# Patient Record
Sex: FEMALE | Race: WHITE | NOT HISPANIC OR LATINO | Employment: OTHER | URBAN - METROPOLITAN AREA
[De-identification: names, ages, dates, MRNs, and addresses within clinical notes are randomized per-mention and may not be internally consistent; named-entity substitution may affect disease eponyms.]

---

## 2017-01-20 ENCOUNTER — APPOINTMENT (OUTPATIENT)
Dept: RADIOLOGY | Facility: MEDICAL CENTER | Age: 67
End: 2017-01-20
Attending: ORTHOPAEDIC SURGERY
Payer: MEDICARE

## 2017-01-20 DIAGNOSIS — M23.203 DERANGEMENT OF MEDIAL MENISCUS OF RIGHT KNEE DUE TO OLD INJURY: ICD-10-CM

## 2017-01-20 PROCEDURE — 73721 MRI JNT OF LWR EXTRE W/O DYE: CPT | Mod: RT

## 2017-01-27 DIAGNOSIS — Z01.810 PRE-OPERATIVE CARDIOVASCULAR EXAMINATION: ICD-10-CM

## 2017-01-27 LAB — EKG IMPRESSION: NORMAL

## 2017-01-27 RX ORDER — CELECOXIB 100 MG/1
100 CAPSULE ORAL PRN
COMMUNITY
End: 2018-03-13

## 2017-01-27 RX ORDER — ACETAMINOPHEN 500 MG
500-1000 TABLET ORAL PRN
Status: ON HOLD | COMMUNITY
End: 2020-12-16

## 2017-01-27 RX ORDER — CYCLOBENZAPRINE HCL 10 MG
10 TABLET ORAL 3 TIMES DAILY PRN
COMMUNITY
End: 2017-03-28 | Stop reason: SDUPTHER

## 2017-01-27 NOTE — OR NURSING
Pre-admit appointment completed. Pt instructed to continue regularly prescribed medications through the day before surgery. Pt instructed to take the following medications the day of surgery with a sip of water, per anesthesia protocol; Synthroid, and tylenol, flexeril, norco if needed.

## 2017-01-30 ENCOUNTER — HOSPITAL ENCOUNTER (OUTPATIENT)
Facility: MEDICAL CENTER | Age: 67
End: 2017-01-30
Attending: ORTHOPAEDIC SURGERY | Admitting: ORTHOPAEDIC SURGERY
Payer: MEDICARE

## 2017-01-30 VITALS
OXYGEN SATURATION: 92 % | HEIGHT: 64 IN | HEART RATE: 75 BPM | BODY MASS INDEX: 35.53 KG/M2 | RESPIRATION RATE: 14 BRPM | DIASTOLIC BLOOD PRESSURE: 58 MMHG | TEMPERATURE: 97 F | SYSTOLIC BLOOD PRESSURE: 119 MMHG | WEIGHT: 208.11 LBS

## 2017-01-30 PROBLEM — M23.203 DERANGEMENT OF MEDIAL MENISCUS OF RIGHT KNEE DUE TO OLD INJURY: Status: ACTIVE | Noted: 2017-01-30

## 2017-01-30 PROCEDURE — 700111 HCHG RX REV CODE 636 W/ 250 OVERRIDE (IP): Performed by: ORTHOPAEDIC SURGERY

## 2017-01-30 PROCEDURE — A6223 GAUZE >16<=48 NO W/SAL W/O B: HCPCS | Performed by: ORTHOPAEDIC SURGERY

## 2017-01-30 PROCEDURE — 700101 HCHG RX REV CODE 250

## 2017-01-30 PROCEDURE — 160009 HCHG ANES TIME/MIN: Performed by: ORTHOPAEDIC SURGERY

## 2017-01-30 PROCEDURE — 160035 HCHG PACU - 1ST 60 MINS PHASE I: Performed by: ORTHOPAEDIC SURGERY

## 2017-01-30 PROCEDURE — 700102 HCHG RX REV CODE 250 W/ 637 OVERRIDE(OP)

## 2017-01-30 PROCEDURE — 502580 HCHG PACK, KNEE ARTHROSCOPY: Performed by: ORTHOPAEDIC SURGERY

## 2017-01-30 PROCEDURE — A9270 NON-COVERED ITEM OR SERVICE: HCPCS

## 2017-01-30 PROCEDURE — 160041 HCHG SURGERY MINUTES - EA ADDL 1 MIN LEVEL 4: Performed by: ORTHOPAEDIC SURGERY

## 2017-01-30 PROCEDURE — 500053 HCHG BANDAGE, ELASTIC 4: Performed by: ORTHOPAEDIC SURGERY

## 2017-01-30 PROCEDURE — 500054 HCHG BANDAGE, ELASTIC 6: Performed by: ORTHOPAEDIC SURGERY

## 2017-01-30 PROCEDURE — 160048 HCHG OR STATISTICAL LEVEL 1-5: Performed by: ORTHOPAEDIC SURGERY

## 2017-01-30 PROCEDURE — A4606 OXYGEN PROBE USED W OXIMETER: HCPCS | Performed by: ORTHOPAEDIC SURGERY

## 2017-01-30 PROCEDURE — 700111 HCHG RX REV CODE 636 W/ 250 OVERRIDE (IP)

## 2017-01-30 PROCEDURE — 160029 HCHG SURGERY MINUTES - 1ST 30 MINS LEVEL 4: Performed by: ORTHOPAEDIC SURGERY

## 2017-01-30 PROCEDURE — 160002 HCHG RECOVERY MINUTES (STAT): Performed by: ORTHOPAEDIC SURGERY

## 2017-01-30 PROCEDURE — 160025 RECOVERY II MINUTES (STATS): Performed by: ORTHOPAEDIC SURGERY

## 2017-01-30 PROCEDURE — 501655 HCHG TUBING, ARTHREX PUMP REDEUCE: Performed by: ORTHOPAEDIC SURGERY

## 2017-01-30 PROCEDURE — 160046 HCHG PACU - 1ST 60 MINS PHASE II: Performed by: ORTHOPAEDIC SURGERY

## 2017-01-30 PROCEDURE — 501654 HCHG TUBING, ARTHREX PATIENT REDEUCE: Performed by: ORTHOPAEDIC SURGERY

## 2017-01-30 PROCEDURE — 110382 HCHG SHELL REV 271: Performed by: ORTHOPAEDIC SURGERY

## 2017-01-30 PROCEDURE — 110371 HCHG SHELL REV 272: Performed by: ORTHOPAEDIC SURGERY

## 2017-01-30 RX ORDER — OXYCODONE HYDROCHLORIDE 10 MG/1
10 TABLET ORAL
Status: DISCONTINUED | OUTPATIENT
Start: 2017-01-30 | End: 2017-01-30 | Stop reason: HOSPADM

## 2017-01-30 RX ORDER — DEXAMETHASONE SODIUM PHOSPHATE 4 MG/ML
4 INJECTION, SOLUTION INTRA-ARTICULAR; INTRALESIONAL; INTRAMUSCULAR; INTRAVENOUS; SOFT TISSUE
Status: DISCONTINUED | OUTPATIENT
Start: 2017-01-30 | End: 2017-01-30 | Stop reason: HOSPADM

## 2017-01-30 RX ORDER — LIDOCAINE HYDROCHLORIDE 10 MG/ML
INJECTION, SOLUTION INFILTRATION; PERINEURAL
Status: DISCONTINUED
Start: 2017-01-30 | End: 2017-01-30 | Stop reason: HOSPADM

## 2017-01-30 RX ORDER — HYDROCODONE BITARTRATE AND ACETAMINOPHEN 10; 325 MG/1; MG/1
.5-1 TABLET ORAL EVERY 4 HOURS PRN
Qty: 30 TAB | Refills: 0 | Status: SHIPPED | OUTPATIENT
Start: 2017-01-30 | End: 2017-03-28 | Stop reason: SDUPTHER

## 2017-01-30 RX ORDER — ONDANSETRON 2 MG/ML
4 INJECTION INTRAMUSCULAR; INTRAVENOUS EVERY 4 HOURS PRN
Status: DISCONTINUED | OUTPATIENT
Start: 2017-01-30 | End: 2017-01-30 | Stop reason: HOSPADM

## 2017-01-30 RX ORDER — HALOPERIDOL 5 MG/ML
1 INJECTION INTRAMUSCULAR EVERY 6 HOURS PRN
Status: DISCONTINUED | OUTPATIENT
Start: 2017-01-30 | End: 2017-01-30 | Stop reason: HOSPADM

## 2017-01-30 RX ORDER — SODIUM CHLORIDE, SODIUM LACTATE, POTASSIUM CHLORIDE, CALCIUM CHLORIDE 600; 310; 30; 20 MG/100ML; MG/100ML; MG/100ML; MG/100ML
INJECTION, SOLUTION INTRAVENOUS
Status: DISCONTINUED | OUTPATIENT
Start: 2017-01-30 | End: 2017-01-30 | Stop reason: HOSPADM

## 2017-01-30 RX ORDER — OXYCODONE HYDROCHLORIDE 5 MG/1
5 TABLET ORAL
Status: DISCONTINUED | OUTPATIENT
Start: 2017-01-30 | End: 2017-01-30 | Stop reason: HOSPADM

## 2017-01-30 RX ORDER — EPINEPHRINE 1 MG/ML
INJECTION INTRAMUSCULAR; INTRAVENOUS; SUBCUTANEOUS
Status: DISCONTINUED | OUTPATIENT
Start: 2017-01-30 | End: 2017-01-30 | Stop reason: HOSPADM

## 2017-01-30 RX ORDER — OXYCODONE HYDROCHLORIDE AND ACETAMINOPHEN 5; 325 MG/1; MG/1
TABLET ORAL
Status: COMPLETED
Start: 2017-01-30 | End: 2017-01-30

## 2017-01-30 RX ORDER — DIPHENHYDRAMINE HYDROCHLORIDE 50 MG/ML
25 INJECTION INTRAMUSCULAR; INTRAVENOUS EVERY 6 HOURS PRN
Status: DISCONTINUED | OUTPATIENT
Start: 2017-01-30 | End: 2017-01-30 | Stop reason: HOSPADM

## 2017-01-30 RX ADMIN — FENTANYL CITRATE 25 MCG: 50 INJECTION, SOLUTION INTRAMUSCULAR; INTRAVENOUS at 09:50

## 2017-01-30 RX ADMIN — FENTANYL CITRATE 50 MCG: 50 INJECTION, SOLUTION INTRAMUSCULAR; INTRAVENOUS at 09:58

## 2017-01-30 RX ADMIN — FENTANYL CITRATE 25 MCG: 50 INJECTION, SOLUTION INTRAMUSCULAR; INTRAVENOUS at 09:45

## 2017-01-30 RX ADMIN — FENTANYL CITRATE 25 MCG: 50 INJECTION, SOLUTION INTRAMUSCULAR; INTRAVENOUS at 10:10

## 2017-01-30 RX ADMIN — OXYCODONE HYDROCHLORIDE AND ACETAMINOPHEN 2 TABLET: 5; 325 TABLET ORAL at 09:41

## 2017-01-30 ASSESSMENT — PAIN SCALES - GENERAL
PAINLEVEL_OUTOF10: 5
PAINLEVEL_OUTOF10: ASSUMED PAIN PRESENT
PAINLEVEL_OUTOF10: 8
PAINLEVEL_OUTOF10: 3
PAINLEVEL_OUTOF10: 7
PAINLEVEL_OUTOF10: 4
PAINLEVEL_OUTOF10: 5

## 2017-01-30 NOTE — OR SURGEON
Immediate Post-Operative Note      PreOp Diagnosis: Right MMT, mild OA    PostOp Diagnosis: Same with fronding patella    Procedure(s):  EUA/ATS/partial MMS, patellar chondroplasty right knee    Surgeon(s):  Will Mattson M.D.    Anesthesiologist/Type of Anesthesia:  Anesthesiologist: Eddie Espino M.D./General    Surgical Staff:  Circulator: Gennaro Martinez R.N.  Scrub Person: Tyshawn Suazo    Specimen: none    Estimated Blood Loss: minimal    Findings: MMT, mild OA    Complications: none        1/30/2017 9:25 AM Will Mattson

## 2017-01-30 NOTE — OR NURSING
"Patient allergies and NPO status verified, home medication reconciliation completed and belongings secured. Surgical site verified with patient. Patient verbalizes understanding of pain scale, expected course of stay and plan of care; patient and family state verbal understanding at this time. IV access established. Sequential in place as ordered. Lungs auscultated; clear bilaterally. Pt reports non-productive cough \"since Thanksgiving\". Denies fevers or s/s of illness.       "

## 2017-01-30 NOTE — OR NURSING
"0925: To PACU from OR via gurney, sleeping, respirations spontaneous and non-labored via OPA.   0933: Pt rouses - reports \"a little\" pain, no nausea.  0940: Sips given. PO meds given.   0945: Medicated with IV for c/o increasing pain  0958: Cont to work on c/o severe pain, tearful  1000:  updated  1015: Meets criteria for d/c.  "

## 2017-01-30 NOTE — IP AVS SNAPSHOT
1/30/2017          Deneen Tyson  [address] [address]    Dear Deneen:    [facility] wants to ensure your discharge home is safe and you or your loved ones have had all your questions answered regarding your care after you leave the hospital.    You may receive a telephone call within two days of your discharge.  This call is to make certain you understand your discharge instructions as well as ensure we provided you with the best care possible during your stay with us.     The call will only last approximately 3-5 minutes and will be done by a nurse.    Once again, we want to ensure your discharge home is safe and that you have a clear understanding of any next steps in your care.  If you have any questions or concerns, please do not hesitate to contact us, we are here for you.  Thank you for choosing [facility] for your healthcare needs.    Sincerely,    Leroy Chavez    [facility]

## 2017-01-30 NOTE — DISCHARGE INSTRUCTIONS
ACTIVITY: Rest and take it easy for the first 24 hours.  A responsible adult is recommended to remain with you during that time.  It is normal to feel sleepy.  We encourage you to not do anything that requires balance, judgment or coordination.    MILD FLU-LIKE SYMPTOMS ARE NORMAL. YOU MAY EXPERIENCE GENERALIZED MUSCLE ACHES, THROAT IRRITATION, HEADACHE AND/OR SOME NAUSEA.    FOR 24 HOURS DO NOT:  Drive, operate machinery or run household appliances.  Drink beer or alcoholic beverages.   Make important decisions or sign legal documents.    SPECIAL INSTRUCTIONS: Weight bearing as tolerated to right knee. Ice pack to knee 20 minutes on, 20 minutes off.    DIET: To avoid nausea, slowly advance diet as tolerated, avoiding spicy or greasy foods for the first day.  Add more substantial food to your diet according to your physician's instructions.  INCREASE FLUIDS AND FIBER TO AVOID CONSTIPATION.    SURGICAL DRESSING/BATHING: Keep dressing clean, dry and intact until instructed otherwise by surgeon.    FOLLOW-UP APPOINTMENT:  Follow-up tomorrow.    You should CALL YOUR PHYSICIAN if you develop:  Fever greater than 101 degrees F.  Pain not relieved by medication, or persistent nausea or vomiting.  Excessive bleeding (blood soaking through dressing) or unexpected drainage from the wound.  Extreme redness or swelling around the incision site, drainage of pus or foul smelling drainage.  Inability to urinate or empty your bladder within 8 hours - return to ER.      You should call 911 if you develop problems with breathing or chest pain.  If you are unable to contact your doctor or surgical center, you should go to the nearest emergency room or urgent care center.  Physician's telephone #: [phone]       If any questions arise, call your doctor.  If your doctor is not available, please feel free to call the Surgical Center at [phone].  The Center is open Monday through Friday from 7AM to 7PM.  You can also call the  HEALTH HOTLINE open 24 hours/day, 7 days/week and speak to a nurse at [phone], or toll free at [phone].    A registered nurse may call you a few days after your surgery to see how you are doing after your procedure.    MEDICATIONS: Resume taking daily medication.  Take prescribed pain medication with food.  If no medication is prescribed, you may take non-aspirin pain medication if needed.  PAIN MEDICATION CAN BE VERY CONSTIPATING.  Take a stool softener or laxative such as senokot, pericolace, or milk of magnesia if needed.    Prescription given for Norco.  Last pain medication given at 9:40 - 2 Percocet 5/325mg.    If your physician has prescribed pain medication that includes Acetaminophen (Tylenol), do not take additional Acetaminophen (Tylenol) while taking the prescribed medication.    Depression / Suicide Risk    As you are discharged from this [facility] facility, it is important to learn how to keep safe from harming yourself.    Recognize the warning signs:  · Abrupt changes in personality, positive or negative- including increase in energy   · Giving away possessions  · Change in eating patterns- significant weight changes-  positive or negative  · Change in sleeping patterns- unable to sleep or sleeping all the time   · Unwillingness or inability to communicate  · Depression  · Unusual sadness, discouragement and loneliness  · Talk of wanting to die  · Neglect of personal appearance   · Rebelliousness- reckless behavior  · Withdrawal from people/activities they love  · Confusion- inability to concentrate     If you or a loved one observes any of these behaviors or has concerns about self-harm, here's what you can do:  · Talk about it- your feelings and reasons for harming yourself  · Remove any means that you might use to hurt yourself (examples: pills, rope, extension cords, firearm)  · Get professional help from the community (Mental Health, Substance Abuse, psychological  counseling)  · Do not be alone:Call your Safe Contact- someone whom you trust who will be there for you.  · Call your local CRISIS HOTLINE 652-3786 or 651-449-7707  · Call your local Children's Mobile Crisis Response Team [place] (417) 137-5573 or www.Careem  · Call the toll free National Suicide Prevention Hotlines   · National Suicide Prevention Lifeline 146-427-EDGO (1160)  · National Hope Line Network 800-SUICIDE (064-2387)

## 2017-01-30 NOTE — OP REPORT
DATE OF SERVICE:  01/30/2017    PREOPERATIVE DIAGNOSES:  Right medial meniscus tear with mild osteoarthritis.    POSTOPERATIVE DIAGNOSES:  Right medial meniscus tear with significant   fronding, median patellar ridge and grade II changes medial femoral condyle.    PROCEDURES PERFORMED:  Examination under anesthesia, arthroscopy, partial   medial meniscectomy and patellar chondroplasty, right knee.    SURGEON:  Will Mattson MD    ANESTHESIA:  General per LMA.    ANESTHESIOLOGIST:  Eddie Espino MD    OPERATIVE INDICATIONS:  The patient is a 66-year-old white female who has had   a recent onset of ongoing and worsening medial right knee pain.  She has an   exam and MRI consistent with a right medial meniscus tear and has not   responded to conservative program.  She is also noted to have some mild   osteoarthritis on the MRI.  All her joint spaces are well preserved on her   plain x-rays.  It was felt that an arthroscopy was indicated in this active   woman.    PROCEDURE IN DETAIL:  The patient was brought to the operating room and placed   on table in a supine position where a satisfactory general anesthetic agent   was administered per LMA.  The patient was given 2 g of Ancef IV prior to   beginning of the procedure.  The right knee revealed a range of motion of 0,   4, 135, with no pathological laxity about the cruciates or collaterals.  The   right lower extremity was placed in the arthroscopic leg beth.  The left   lower extremity supported on pillows.  The right lower extremity was prepped   with gel prep, draped free in a sterile fashion.  A superomedial portal was   created and the inflow cannula was inserted.  There was a moderate amount of   clear serous fluid noted within the joint.  An anterolateral portal was   created and a 30-degree arthroscope was inserted.  A comprehensive arthroscopy   of the knee was performed.  Suprapatellar pouch was free of defects.  The   undersurface of the  patella showed some diffuse grade III changes with   fronding throughout the median ridge and the lateral patellar facet.  The   trochlea was in good condition.  The medial and lateral gutters showed no   pathology.  The medial compartment was entered.  There was some mild grade II   change on the medial femoral condyle more toward the notch.  There was a   complex tear of the medial meniscus from the posterior horn to the mid medial   portion.  The ACL was visualized and noted to be intact.  The lateral   compartment was entered.  Lateral articular surfaces and lateral meniscus were   grossly intact.  An anteromedial portal was created and a probe was inserted.    Probing of the lateral meniscus and ACL revealed no further pathology.    Probing the medial meniscus revealed this complex tear as above.  A partial   medial meniscectomy was performed with baskets and a shaver removing   approximately 60% of the medial meniscus from the posterior horn to the mid   medial portion.  This was smoothed to a stable meniscal rim with a shaver.  A   chondroplasty was performed on the patella using a shaver, removing the   fronded portions of cartilage back to a stable cartilaginous base.  The   posteromedial and posterolateral compartments reviewed through the notch with   no further evidence of pathology.  The joint was drained of fluid and a   sterile dressing of Adaptic flats, cast padding, and Ace wraps was applied.    The patient was awakened, extubated in the operating room, taken to the   recovery room in stable condition.  Sponge and needle counts were correct.    There were no identified intraoperative complications.       ____________________________________     MD MAME Noriega / KENDRICK    DD:  01/30/2017 09:25:24  DT:  01/30/2017 09:59:36    D#:  [id number]  Job#:  [id number]

## 2017-01-30 NOTE — IP AVS SNAPSHOT
" After Visit Summary                                                                                                                Name:Deneen Tyson  Medical Record Number:[id number]  CSN: [id number]    YOB: 1950   Age: 66 y.o.  Sex: female  HT:1.626 m (5' 4\") WT: 94.4 kg (208 lb 1.8 oz)          Admit Date: 1/30/2017     Discharge Date:   Today's Date: 1/30/2017  Attending Doctor:  Will Mattson M.D.                  Allergies:  Review of patient's allergies indicates no known allergies.              Follow-up Information     1. Follow up with Will Mattson M.D. In 1 day.    Specialty:  Orthopaedics    Contact information    [address]  [address]  [facility] [id number] [phone]          Discharge Instructions         ACTIVITY: Rest and take it easy for the first 24 hours.  A responsible adult is recommended to remain with you during that time.  It is normal to feel sleepy.  We encourage you to not do anything that requires balance, judgment or coordination.    MILD FLU-LIKE SYMPTOMS ARE NORMAL. YOU MAY EXPERIENCE GENERALIZED MUSCLE ACHES, THROAT IRRITATION, HEADACHE AND/OR SOME NAUSEA.    FOR 24 HOURS DO NOT:  Drive, operate machinery or run household appliances.  Drink beer or alcoholic beverages.   Make important decisions or sign legal documents.    SPECIAL INSTRUCTIONS: Weight bearing as tolerated to right knee. Ice pack to knee 20 minutes on, 20 minutes off.    DIET: To avoid nausea, slowly advance diet as tolerated, avoiding spicy or greasy foods for the first day.  Add more substantial food to your diet according to your physician's instructions.  INCREASE FLUIDS AND FIBER TO AVOID CONSTIPATION.    SURGICAL DRESSING/BATHING: Keep dressing clean, dry and intact until instructed otherwise by surgeon.    FOLLOW-UP APPOINTMENT:  Follow-up tomorrow.    You should CALL YOUR PHYSICIAN if you develop:  Fever greater than 101 degrees F.  Pain not relieved by medication, or " persistent nausea or vomiting.  Excessive bleeding (blood soaking through dressing) or unexpected drainage from the wound.  Extreme redness or swelling around the incision site, drainage of pus or foul smelling drainage.  Inability to urinate or empty your bladder within 8 hours - return to ER.      You should call 911 if you develop problems with breathing or chest pain.  If you are unable to contact your doctor or surgical center, you should go to the nearest emergency room or urgent care center.  Physician's telephone #: [phone]       If any questions arise, call your doctor.  If your doctor is not available, please feel free to call the Surgical Center at [phone].  The Center is open Monday through Friday from 7AM to 7PM.  You can also call the [employer] HOTLINE open 24 hours/day, 7 days/week and speak to a nurse at [phone], or toll free at [phone].    A registered nurse may call you a few days after your surgery to see how you are doing after your procedure.    MEDICATIONS: Resume taking daily medication.  Take prescribed pain medication with food.  If no medication is prescribed, you may take non-aspirin pain medication if needed.  PAIN MEDICATION CAN BE VERY CONSTIPATING.  Take a stool softener or laxative such as senokot, pericolace, or milk of magnesia if needed.    Prescription given for Norco.  Last pain medication given at 9:40 - 2 Percocet 5/325mg.    If your physician has prescribed pain medication that includes Acetaminophen (Tylenol), do not take additional Acetaminophen (Tylenol) while taking the prescribed medication.    Depression / Suicide Risk    As you are discharged from this [facility] facility, it is important to learn how to keep safe from harming yourself.    Recognize the warning signs:  · Abrupt changes in personality, positive or negative- including increase in energy   · Giving away possessions  · Change in eating patterns- significant weight changes-  positive or  negative  · Change in sleeping patterns- unable to sleep or sleeping all the time   · Unwillingness or inability to communicate  · Depression  · Unusual sadness, discouragement and loneliness  · Talk of wanting to die  · Neglect of personal appearance   · Rebelliousness- reckless behavior  · Withdrawal from people/activities they love  · Confusion- inability to concentrate     If you or a loved one observes any of these behaviors or has concerns about self-harm, here's what you can do:  · Talk about it- your feelings and reasons for harming yourself  · Remove any means that you might use to hurt yourself (examples: pills, rope, extension cords, firearm)  · Get professional help from the community (Mental Health, Substance Abuse, psychological counseling)  · Do not be alone:Call your Safe Contact- someone whom you trust who will be there for you.  · Call your local CRISIS HOTLINE 148-8086 or 990-750-6433  · Call your local Children's Mobile Crisis Response Team [place] (211) 134-4681 or www.SoPost  · Call the toll free National Suicide Prevention Hotlines   · National Suicide Prevention Lifeline 382-936-KSGX (4632)  · National Hope Line Network 800-SUICIDE (642-6924)       Medication List      START taking these medications        Instructions    hydrocodone/acetaminophen  MG Tabs   Commonly known as:  NORCO   Replaces:  hydrocodone-acetaminophen 5-325 MG Tabs per tablet    Take 0.5-1 Tabs by mouth every four hours as needed.   Dose:  0.5-1 Tab         CONTINUE taking these medications        Instructions    acetaminophen 500 MG Tabs   Commonly known as:  TYLENOL    Take 500-1,000 mg by mouth 3 times a day.   Dose:  500-1000 mg       ascorbic acid 500 MG Tabs   Commonly known as:  ascorbic acid    Take 1,000 mg by mouth every day.   Dose:  1000 mg       aspirin EC 81 MG Tbec   Commonly known as:  ECOTRIN    Take 81 mg by mouth every day.   Dose:  81 mg       BETA CAROTENE PO    Take 10,000 Int'l  Units by mouth every day.   Dose:  49060 Int'l Units       Calcium 1500 MG Tabs    Take  by mouth every day.       celecoxib 100 MG Caps   Commonly known as:  CELEBREX    Take 100 mg by mouth as needed.   Dose:  100 mg       cyclobenzaprine 10 MG Tabs   Commonly known as:  FLEXERIL    Take 10 mg by mouth 3 times a day as needed.   Dose:  10 mg       levothyroxine 50 MCG Tabs   Commonly known as:  SYNTHROID    TAKE ONE TABLET BY MOUTH ONCE DAILY IN THE MORNING ON AN EMPTY STOMACH       Magnesium 500 MG Caps    Take 875 mg by mouth every evening.   Dose:  875 mg       Non Formulary Request    2 Times a Day. Zyflamend organic anti-inflammatory       nystatin powder   Commonly known as:  MYCOSTATIN    Apply to chest rash twice a day after washing       POTASSIMIN PO    Take 45 mg by mouth every day.   Dose:  45 mg       pravastatin 20 MG Tabs   Commonly known as:  PRAVACHOL    Take 1 Tab by mouth every bedtime.   Dose:  20 mg       RED YEAST RICE PO    Take  by mouth 2 Times a Day.       STRESS B COMPLEX PO    Take  by mouth 2 Times a Day.       vitamin D3 5000 UNITS Caps    Take 1 Cap by mouth every day.   Dose:  1 Cap         STOP taking these medications     hydrocodone-acetaminophen 5-325 MG Tabs per tablet   Commonly known as:  NORCO   Replaced by:  hydrocodone/acetaminophen  MG Tabs               Medication Information     Above and/or attached are the medications your physician expects you to take upon discharge. Review all of your home medications and newly ordered medications with your doctor and/or pharmacist. Follow medication instructions as directed by your doctor and/or pharmacist. Please keep your medication list with you and share with your physician. Update the information when medications are discontinued, doses are changed, or new medications (including over-the-counter products) are added; and carry medication information at all times in the event of emergency situations.        Resources      Quit Smoking / Tobacco Use:    I understand the use of any tobacco products increases my chance of suffering from future heart disease or stroke and could cause other illnesses which may shorten my life. Quitting the use of tobacco products is the single most important thing I can do to improve my health. For further information on smoking / tobacco cessation call a Toll Free Quit Line at 1-671.838.4462 (*National [employer] [place]) or 1-773.829.5409 (American Lung Association) or you can access the web based program at www.lungusa.org.    [place] Tobacco Users Help Line:  (696) 547-7003       Toll Free: 1-811.972.5607  Quit Tobacco Program [facility] (399)259-0042    Crisis Hotline:    [place] Crisis Hotline:  4-054-IHEGOLI or 1-227.318.2307    [place] Crisis Hotline:    1-135.837.7260 or 456-105-2863    Discharge Survey:   Thank you for choosing [facility]. We hope we did everything we could to make your hospital stay a pleasant one. You may be receiving a survey and we would appreciate your time and participation in answering the questions. Your input is very valuable to us in our efforts to improve our service to our patients and their families.            Signatures     My signature on this form indicates that:    1. I acknowledge receipt and understanding of these Home Care Instruction.  2. My questions regarding this information have been answered to my satisfaction.  3. I have formulated a plan with my discharge nurse to obtain my prescribed medications for home.    __________________________________      __________________________________                   Patient Signature                                 Guardian/Responsible Adult Signature      __________________________________                 __________       ________                       Nurse Signature                                               Date                 Time

## 2017-02-15 ENCOUNTER — TELEPHONE (OUTPATIENT)
Dept: MEDICAL GROUP | Facility: PHYSICIAN GROUP | Age: 67
End: 2017-02-15

## 2017-02-15 NOTE — TELEPHONE ENCOUNTER
Pt was prescribed norco 5-325 mg last year in April. Pt requesting refill of norco medication. Pt was also prescribed norco 10-325mg on 1/30/17 by a different provider.

## 2017-02-15 NOTE — TELEPHONE ENCOUNTER
Pt notified. She states that she doesn't need a pain specialist, she just needs norco because once ever month or so her back muscle spasms and norco helps. She states she'll discuss this next appt.

## 2017-02-15 NOTE — TELEPHONE ENCOUNTER
Sorry I no longer do long-term narcotic refills. She will need to see a pain specialist if she wants to be on long-term narcotics. I can refer her to one. She got her last refill from Dr. Mattson January 30, 2017.Sen Garcia MD

## 2017-02-27 ENCOUNTER — PATIENT OUTREACH (OUTPATIENT)
Dept: HEALTH INFORMATION MANAGEMENT | Facility: OTHER | Age: 67
End: 2017-02-27

## 2017-02-27 NOTE — PROGRESS NOTES
02/27/2017  Attempt #:FINAL      Annual Wellness Visit Scheduling  1. Scheduling Status:Not Scheduled. Patient states they are not interested            Care Gap Scheduling (Attempt to Schedule EACH Overdue Care Gap!)     Health Maintenance Due   Topic Date Due   • BONE DENSITY  NA   • IMM INFLUENZA (1) NA         MyChart Activation: already active

## 2017-03-16 RX ORDER — LEVOTHYROXINE SODIUM 0.05 MG/1
TABLET ORAL
Qty: 90 TAB | Refills: 0 | Status: SHIPPED | OUTPATIENT
Start: 2017-03-16 | End: 2017-03-28 | Stop reason: SDUPTHER

## 2017-03-28 ENCOUNTER — OFFICE VISIT (OUTPATIENT)
Dept: MEDICAL GROUP | Facility: PHYSICIAN GROUP | Age: 67
End: 2017-03-28
Payer: MEDICARE

## 2017-03-28 VITALS
HEIGHT: 64 IN | OXYGEN SATURATION: 95 % | RESPIRATION RATE: 16 BRPM | WEIGHT: 205 LBS | TEMPERATURE: 97.7 F | DIASTOLIC BLOOD PRESSURE: 60 MMHG | HEART RATE: 103 BPM | SYSTOLIC BLOOD PRESSURE: 128 MMHG | BODY MASS INDEX: 35 KG/M2

## 2017-03-28 DIAGNOSIS — E03.8 OTHER SPECIFIED HYPOTHYROIDISM: ICD-10-CM

## 2017-03-28 DIAGNOSIS — E66.9 OBESITY (BMI 30-39.9): ICD-10-CM

## 2017-03-28 DIAGNOSIS — E78.2 MIXED HYPERLIPIDEMIA: ICD-10-CM

## 2017-03-28 DIAGNOSIS — M47.896 OTHER OSTEOARTHRITIS OF SPINE, LUMBAR REGION: ICD-10-CM

## 2017-03-28 DIAGNOSIS — Z98.890 HISTORY OF ARTHROSCOPY OF RIGHT KNEE: ICD-10-CM

## 2017-03-28 PROCEDURE — 1101F PT FALLS ASSESS-DOCD LE1/YR: CPT | Performed by: FAMILY MEDICINE

## 2017-03-28 PROCEDURE — 4040F PNEUMOC VAC/ADMIN/RCVD: CPT | Performed by: FAMILY MEDICINE

## 2017-03-28 PROCEDURE — G8484 FLU IMMUNIZE NO ADMIN: HCPCS | Performed by: FAMILY MEDICINE

## 2017-03-28 PROCEDURE — G8432 DEP SCR NOT DOC, RNG: HCPCS | Performed by: FAMILY MEDICINE

## 2017-03-28 PROCEDURE — 1036F TOBACCO NON-USER: CPT | Performed by: FAMILY MEDICINE

## 2017-03-28 PROCEDURE — 3014F SCREEN MAMMO DOC REV: CPT | Mod: 8P | Performed by: FAMILY MEDICINE

## 2017-03-28 PROCEDURE — 99214 OFFICE O/P EST MOD 30 MIN: CPT | Performed by: FAMILY MEDICINE

## 2017-03-28 PROCEDURE — 3017F COLORECTAL CA SCREEN DOC REV: CPT | Performed by: FAMILY MEDICINE

## 2017-03-28 PROCEDURE — G8419 CALC BMI OUT NRM PARAM NOF/U: HCPCS | Performed by: FAMILY MEDICINE

## 2017-03-28 RX ORDER — CYCLOBENZAPRINE HCL 10 MG
10 TABLET ORAL 3 TIMES DAILY PRN
Qty: 30 TAB | Refills: 3 | Status: SHIPPED | OUTPATIENT
Start: 2017-03-28 | End: 2019-07-08

## 2017-03-28 RX ORDER — HYDROCODONE BITARTRATE AND ACETAMINOPHEN 10; 325 MG/1; MG/1
.5-1 TABLET ORAL EVERY 4 HOURS PRN
Qty: 60 TAB | Refills: 0 | Status: SHIPPED | OUTPATIENT
Start: 2017-03-28 | End: 2020-06-23

## 2017-03-28 RX ORDER — PRAVASTATIN SODIUM 20 MG
20 TABLET ORAL
Qty: 90 TAB | Refills: 3 | Status: SHIPPED | OUTPATIENT
Start: 2017-03-28 | End: 2018-05-21 | Stop reason: SDUPTHER

## 2017-03-28 RX ORDER — LEVOTHYROXINE SODIUM 0.05 MG/1
TABLET ORAL
Qty: 90 TAB | Refills: 3 | Status: SHIPPED | OUTPATIENT
Start: 2017-03-28 | End: 2018-04-10 | Stop reason: SDUPTHER

## 2017-03-28 NOTE — PROGRESS NOTES
Patient comes in and is recovering from arthroscopy on her right knee. She had meniscus repair as well as cleaning out of some arthritis. She is recovering and needs a refill of some pain medication. She is getting physical therapy.  The patient is due for lab work.  She is working on losing weight. I encouraged her to continue doing so especially now that she has improving function in her knee.    I reviewed the following    Past Medical History   Diagnosis Date   • Hyperlipidemia    • Arthritis      to joints   • Disorder of thyroid      hypoactive   • Pain 4/22/16     low back-chronic   • Pain 1/27/17     right knee   • High cholesterol         Past Surgical History   Procedure Laterality Date   • Shoulder surgery Left 2008     removal of bone spur left shoulder   • Trigger finger release Left 4/27/2016     Procedure: TRIGGER FINGER RELEASE - THUMB;  Surgeon: Bhavin Barker M.D.;  Location: [facility];  Service:    • Dequervains release Right 3/2015     wrist   • Colonoscopy  2009   • Knee arthroscopy Right 1/30/2017     Procedure: KNEE ARTHROSCOPY;  Surgeon: Will Mattson M.D.;  Location: [facility];  Service:    • Medial meniscectomy  1/30/2017     Procedure: MEDIAL MENISCECTOMY - PARTIAL;  Surgeon: Will Mtatson M.D.;  Location: [facility];  Service:        No Known Allergies    Current Outpatient Prescriptions   Medication Sig Dispense Refill   • pravastatin (PRAVACHOL) 20 MG Tab Take 1 Tab by mouth every bedtime. 90 Tab 3   • levothyroxine (SYNTHROID) 50 MCG Tab TAKE ONE TABLET BY MOUTH ONCE DAILY IN THE MORNING ON  AN  EMPTY  STOMACH 90 Tab 3   • cyclobenzaprine (FLEXERIL) 10 MG Tab Take 1 Tab by mouth 3 times a day as needed. 30 Tab 3   • hydrocodone/acetaminophen (NORCO)  MG Tab Take 0.5-1 Tabs by mouth every four hours as needed. 60 Tab 0   • acetaminophen (TYLENOL) 500 MG Tab Take 500-1,000 mg by mouth 3 times a day.     • Cholecalciferol  (VITAMIN D3) 5000 UNITS Cap Take 1 Cap by mouth every day.     • nystatin (MYCOSTATIN) powder Apply to chest rash twice a day after washing 60 g 2   • ascorbic acid (ASCORBIC ACID) 500 MG Tab Take 1,000 mg by mouth every day.     • BETA CAROTENE PO Take 10,000 Int'l Units by mouth every day.     • B Complex-C-Folic Acid (STRESS B COMPLEX PO) Take  by mouth 2 Times a Day.     • Non Formulary Request 2 Times a Day. Zyflamend organic anti-inflammatory     • Red Yeast Rice Extract (RED YEAST RICE PO) Take  by mouth 2 Times a Day.     • aspirin EC (ECOTRIN) 81 MG TBEC Take 81 mg by mouth every day.     • Calcium 1500 MG TABS Take  by mouth every day.     • Magnesium 500 MG CAPS Take 875 mg by mouth every evening.     • Potassium (POTASSIMIN PO) Take 45 mg by mouth every day.     • celecoxib (CELEBREX) 100 MG Cap Take 100 mg by mouth as needed.       No current facility-administered medications for this visit.        Family History   Problem Relation Age of Onset   • Stroke Father        Social History     Social History   • Marital Status:      Spouse Name: N/A   • Number of Children: N/A   • Years of Education: N/A     Occupational History   • Not on file.     Social History Main Topics   • Smoking status: Never Smoker    • Smokeless tobacco: Never Used   • Alcohol Use: No   • Drug Use: No   • Sexual Activity:     Partners: Male     Birth Control/ Protection: Post-Menopausal     Other Topics Concern   • Not on file     Social History Narrative          Physical Exam   Constitutional: She is oriented. She appears well-developed and obese. No distress. She is wearing a boot on her right foot because she injured her ankle and is walking better with the use of the boot. No fractures in her ankle.  HENT:  Head: Normocephalic and atraumatic.   Right Ear: External ear normal. Ear canal and TM normal   Left Ear: External ear normal. Ear canal and TM normal  Nose: Nose normal.   Mouth/Throat: Oropharynx is clear and  moist.   Eyes: Conjunctivae and extraocular motions are normal. Pupils are equal, round, and reactive to light. Fundi benign bilaterally   Neck: No thyromegaly present.   Cardiovascular: Normal rate, regular rhythm, normal heart sounds and intact distal pulses.  Exam reveals no gallop.    No murmur heard.  Pulmonary/Chest: Effort normal and breath sounds normal. No respiratory distress. She has no wheezes. She has no rales.   Abdominal: Soft. Bowel sounds are normal. No hepatosplenomegaly She exhibits no distension. No tenderness. She has no rebound and no guarding.   Musculoskeletal: Normal range of motion. She exhibits no edema and no tenderness.   Lymphadenopathy:     She has no cervical adenopathy.   No supraclavicular adenopathy  Neurological: She is alert and oriented. She has normal reflexes.        Babinskis downgoing bilaterally   Skin: Skin is warm and dry. No rash noted. No erythema.   Psychiatric: She has a normal mood and appropriate affect. Her behavior is normal. Judgment and thought content normal.    1. History of arthroscopy of right knee  hydrocodone/acetaminophen (NORCO)  MG Tab--one time refill    2. Mixed hyperlipidemia --needs reassessment  pravastatin (PRAVACHOL) 20 MG Tab    CBC WITH DIFFERENTIAL    COMP METABOLIC PANEL    LIPID PROFILE    VITAMIN D 25-HYDROXY   3. Other specified hypothyroidism --needs reassessment  levothyroxine (SYNTHROID) 50 MCG Tab    TSH    FREE THYROXINE   4. Other osteoarthritis of spine, lumbar region  cyclobenzaprine (FLEXERIL) 10 MG Tab    hydrocodone/acetaminophen (NORCO)  MG Tab--one time refill    5. Obesity (BMI 30-39.9)  Patient identified as having weight management issue.  Appropriate orders and counseling given.     Recheck 6 months or when necessary    Please note that this dictation was created using voice recognition software. I have worked with consultants from the vendor as well as technical experts from [employer] to optimize the  interface. I have made every reasonable attempt to correct obvious errors, but I expect that there are errors of grammar and possibly content that I did not discover before finalizing the note.

## 2017-03-28 NOTE — PATIENT INSTRUCTIONS
Patient given written instructions regarding labs,  medications, referrals, dietary and lifestyle management, and return visit.    Sen Garcia MD

## 2017-03-28 NOTE — MR AVS SNAPSHOT
"        Deneen CHASTITY BrownJennie   3/28/2017 1:40 PM   Office Visit   MRN: [id number]    Department:  [facility]   Dept Phone:  [phone]    Description:  Female : 1950   Provider:  Sen Garcia M.D.           Reason for Visit     Follow-Up           Allergies as of 3/28/2017     No Known Allergies      You were diagnosed with     History of arthroscopy of right knee   [430350]       Mixed hyperlipidemia   [272.2.ICD-9-CM]       Other specified hypothyroidism   [6784909]       Other osteoarthritis of spine, lumbar region   [6613063]         Vital Signs     Blood Pressure Pulse Temperature Respirations Height Weight    128/60 mmHg 103 36.5 °C (97.7 °F) 16 1.626 m (5' 4.02\") 92.987 kg (205 lb)    Body Mass Index Oxygen Saturation Smoking Status             35.17 kg/m2 95% Never Smoker          Basic Information     Date Of Birth Sex Race Ethnicity Preferred Language    1950 Female White Non- English      Your appointments     Oct 04, 2017  1:00 PM   Established Patient with Sen Garcia M.D.   [facility] ([place])    [address] [place] [id number] [phone]           You will be receiving a confirmation call a few days before your appointment from our automated call confirmation system.              Problem List              ICD-10-CM Priority Class Noted - Resolved    Degenerative arthritis of lumbar spine M47.816   2015 - Present    Abnormal MRI, lumbar spine R93.7   2015 - Present    Osteopenia M85.80   2015 - Present    Hyperlipidemia E78.5   2015 - Present    Family history of diabetes mellitus Z83.3   2015 - Present    Left hand pain M79.642   2016 - Present    Acute pain of right knee M25.561   2016 - Present    Derangement of medial meniscus of right knee due to old injury M23.203   2017 - Present      Health Maintenance        Date Due Completion Dates    BONE DENSITY 3/18/2016 ---    IMM INFLUENZA (1) " 9/1/2016 1/5/2016, 10/13/2014    MAMMOGRAM 2/27/2018 (Originally 8/19/2016) 8/19/2015, 8/12/2015, 8/12/2015, 7/29/2014 (Done), 1/23/2012 (Done), 12/18/2007, 12/18/2007, 8/29/2006    Override on 7/29/2014: Done (normal)    Override on 1/23/2012: Done (normal )    IMM PNEUMOCOCCAL 65+ (ADULT) LOW/MEDIUM RISK SERIES (2 of 2 - PPSV23) 3/31/2017 3/31/2016    PAP SMEAR 9/3/2018 9/3/2015, 9/3/2015 (Done)    Override on 9/3/2015: Done    IMM DTaP/Tdap/Td Vaccine (3 - Td) 8/24/2019 8/24/2009, 4/3/2007    COLONOSCOPY 9/16/2019 9/16/2009            Current Immunizations     13-VALENT PCV PREVNAR 3/31/2016    Hepatitis A Vaccine, Adult 9/8/2004    Hepatitis B Vaccine Recombivax (Adol/Adult) 9/8/2004, 4/12/2004, 3/5/2004    Influenza Vaccine Quad Inj (Preserved) 1/5/2016, 10/13/2014, 10/13/2014    SHINGLES VACCINE 3/27/2012    Tdap Vaccine 8/24/2009, 4/3/2007    Typhoid Vaccine 4/20/2004    Typhoid Vaccine (Oral) 3/27/2012, 4/3/2007      Below and/or attached are the medications your provider expects you to take. Review all of your home medications and newly ordered medications with your provider and/or pharmacist. Follow medication instructions as directed by your provider and/or pharmacist. Please keep your medication list with you and share with your provider. Update the information when medications are discontinued, doses are changed, or new medications (including over-the-counter products) are added; and carry medication information at all times in the event of emergency situations     Allergies:  No Known Allergies          Medications  Valid as of: March 28, 2017 -  2:18 PM    Generic Name Brand Name Tablet Size Instructions for use    Acetaminophen (Tab) TYLENOL 500 MG Take 500-1,000 mg by mouth 3 times a day.        Ascorbic Acid (Tab) ascorbic acid 500 MG Take 1,000 mg by mouth every day.        Aspirin (Tablet Delayed Response) ECOTRIN 81 MG Take 81 mg by mouth every day.        B Complex-C-Folic Acid   Take  by mouth  2 Times a Day.        Beta Carotene   Take 10,000 Int'l Units by mouth every day.        Calcium Carbonate (Tab) Calcium 1500 MG Take  by mouth every day.        Celecoxib (Cap) CELEBREX 100 MG Take 100 mg by mouth as needed.        Cholecalciferol (Cap) vitamin D3 5000 UNITS Take 1 Cap by mouth every day.        Cyclobenzaprine HCl (Tab) FLEXERIL 10 MG Take 1 Tab by mouth 3 times a day as needed.        Hydrocodone-Acetaminophen (Tab) NORCO  MG Take 0.5-1 Tabs by mouth every four hours as needed.        Levothyroxine Sodium (Tab) SYNTHROID 50 MCG TAKE ONE TABLET BY MOUTH ONCE DAILY IN THE MORNING ON  AN  EMPTY  STOMACH        Magnesium Oxide (Cap) Magnesium 500 MG Take 875 mg by mouth every evening.        Non Formulary Request Non Formulary Request  2 Times a Day. Zyflamend organic anti-inflammatory        Nystatin (Powder) MYCOSTATIN 471793 UNIT/GM Apply to chest rash twice a day after washing        Potassium   Take 45 mg by mouth every day.        Pravastatin Sodium (Tab) PRAVACHOL 20 MG Take 1 Tab by mouth every bedtime.        Red Yeast Rice Extract   Take  by mouth 2 Times a Day.        .                 Medicines prescribed today were sent to:     [facility] [address] [place], [place] [address] [address] [address]    Phone: [phone] Fax: [phone]    Open 24 Hours?: No      Medication refill instructions:       If your prescription bottle indicates you have medication refills left, it is not necessary to call your provider’s office. Please contact your pharmacy and they will refill your medication.    If your prescription bottle indicates you do not have any refills left, you may request refills at any time through one of the following ways: The online [employer] system (except Urgent Care), by calling your provider’s office, or by asking your pharmacy to contact your provider’s office with a refill request. Medication refills are processed only during regular  business hours and may not be available until the next business day. Your provider may request additional information or to have a follow-up visit with you prior to refilling your medication.   *Please Note: Medication refills are assigned a new Rx number when refilled electronically. Your pharmacy may indicate that no refills were authorized even though a new prescription for the same medication is available at the pharmacy. Please request the medicine by name with the pharmacy before contacting your provider for a refill.        Your To Do List     Future Labs/Procedures Complete By Expires    CBC WITH DIFFERENTIAL  As directed 3/29/2018    COMP METABOLIC PANEL  As directed 3/29/2018    FREE THYROXINE  As directed 3/29/2018    LIPID PROFILE  As directed 3/29/2018    TSH  As directed 3/29/2018         MyChart Access Code: Activation code not generated  Current [employer] Status: Active

## 2017-03-31 ENCOUNTER — HOSPITAL ENCOUNTER (OUTPATIENT)
Dept: LAB | Facility: MEDICAL CENTER | Age: 67
End: 2017-03-31
Attending: FAMILY MEDICINE
Payer: MEDICARE

## 2017-03-31 DIAGNOSIS — E03.8 OTHER SPECIFIED HYPOTHYROIDISM: ICD-10-CM

## 2017-03-31 DIAGNOSIS — E55.9 VITAMIN D DEFICIENCY: ICD-10-CM

## 2017-03-31 DIAGNOSIS — E78.2 MIXED HYPERLIPIDEMIA: ICD-10-CM

## 2017-03-31 LAB
25(OH)D3 SERPL-MCNC: 60 NG/ML (ref 30–100)
ALBUMIN SERPL BCP-MCNC: 4 G/DL (ref 3.2–4.9)
ALBUMIN/GLOB SERPL: 1.3 G/DL
ALP SERPL-CCNC: 66 U/L (ref 30–99)
ALT SERPL-CCNC: 24 U/L (ref 2–50)
ANION GAP SERPL CALC-SCNC: 9 MMOL/L (ref 0–11.9)
AST SERPL-CCNC: 21 U/L (ref 12–45)
BASOPHILS # BLD AUTO: 0.05 K/UL (ref 0–0.12)
BASOPHILS NFR BLD AUTO: 0.8 % (ref 0–1.8)
BILIRUB SERPL-MCNC: 0.6 MG/DL (ref 0.1–1.5)
BUN SERPL-MCNC: 15 MG/DL (ref 8–22)
CALCIUM SERPL-MCNC: 9.4 MG/DL (ref 8.5–10.5)
CHLORIDE SERPL-SCNC: 103 MMOL/L (ref 96–112)
CHOLEST SERPL-MCNC: 168 MG/DL (ref 100–199)
CO2 SERPL-SCNC: 29 MMOL/L (ref 20–33)
CREAT SERPL-MCNC: 1.03 MG/DL (ref 0.5–1.4)
EOSINOPHIL # BLD: 0.1 K/UL (ref 0–0.51)
EOSINOPHIL NFR BLD AUTO: 1.6 % (ref 0–6.9)
ERYTHROCYTE [DISTWIDTH] IN BLOOD BY AUTOMATED COUNT: 44 FL (ref 35.9–50)
GLOBULIN SER CALC-MCNC: 3.2 G/DL (ref 1.9–3.5)
GLUCOSE SERPL-MCNC: 103 MG/DL (ref 65–99)
HCT VFR BLD AUTO: 46.7 % (ref 37–47)
HDLC SERPL-MCNC: 45 MG/DL
HGB BLD-MCNC: 15 G/DL (ref 12–16)
IMM GRANULOCYTES # BLD AUTO: 0.02 K/UL (ref 0–0.11)
IMM GRANULOCYTES NFR BLD AUTO: 0.3 % (ref 0–0.9)
LDLC SERPL CALC-MCNC: 93 MG/DL
LYMPHOCYTES # BLD: 1.41 K/UL (ref 1–4.8)
LYMPHOCYTES NFR BLD AUTO: 22.7 % (ref 22–41)
MCH RBC QN AUTO: 28.6 PG (ref 27–33)
MCHC RBC AUTO-ENTMCNC: 32.1 G/DL (ref 33.6–35)
MCV RBC AUTO: 89.1 FL (ref 81.4–97.8)
MONOCYTES # BLD: 0.44 K/UL (ref 0–0.85)
MONOCYTES NFR BLD AUTO: 7.1 % (ref 0–13.4)
NEUTROPHILS # BLD: 4.2 K/UL (ref 2–7.15)
NEUTROPHILS NFR BLD AUTO: 67.5 % (ref 44–72)
NRBC # BLD AUTO: 0 K/UL
NRBC BLD-RTO: 0 /100 WBC
PLATELET # BLD AUTO: 274 K/UL (ref 164–446)
PMV BLD AUTO: 9.7 FL (ref 9–12.9)
POTASSIUM SERPL-SCNC: 4.6 MMOL/L (ref 3.6–5.5)
PROT SERPL-MCNC: 7.2 G/DL (ref 6–8.2)
RBC # BLD AUTO: 5.24 M/UL (ref 4.2–5.4)
SODIUM SERPL-SCNC: 141 MMOL/L (ref 135–145)
T4 FREE SERPL-MCNC: 1.24 NG/DL (ref 0.53–1.43)
TRIGL SERPL-MCNC: 148 MG/DL (ref 0–149)
TSH SERPL DL<=0.005 MIU/L-ACNC: 1.23 UIU/ML (ref 0.3–3.7)
WBC # BLD AUTO: 6.2 K/UL (ref 4.8–10.8)

## 2017-03-31 PROCEDURE — 85025 COMPLETE CBC W/AUTO DIFF WBC: CPT

## 2017-03-31 PROCEDURE — 80053 COMPREHEN METABOLIC PANEL: CPT

## 2017-03-31 PROCEDURE — 82306 VITAMIN D 25 HYDROXY: CPT

## 2017-03-31 PROCEDURE — 84443 ASSAY THYROID STIM HORMONE: CPT

## 2017-03-31 PROCEDURE — 36415 COLL VENOUS BLD VENIPUNCTURE: CPT

## 2017-03-31 PROCEDURE — 80061 LIPID PANEL: CPT

## 2017-03-31 PROCEDURE — 84439 ASSAY OF FREE THYROXINE: CPT

## 2017-03-31 RX ORDER — MELATONIN
1000 DAILY
Qty: 100 TAB | Refills: 3
Start: 2017-03-31

## 2017-10-04 ENCOUNTER — OFFICE VISIT (OUTPATIENT)
Dept: MEDICAL GROUP | Facility: PHYSICIAN GROUP | Age: 67
End: 2017-10-04
Payer: MEDICARE

## 2017-10-04 VITALS
OXYGEN SATURATION: 97 % | SYSTOLIC BLOOD PRESSURE: 112 MMHG | TEMPERATURE: 97.9 F | RESPIRATION RATE: 14 BRPM | WEIGHT: 189 LBS | HEART RATE: 68 BPM | BODY MASS INDEX: 32.27 KG/M2 | DIASTOLIC BLOOD PRESSURE: 60 MMHG | HEIGHT: 64 IN

## 2017-10-04 DIAGNOSIS — Z23 NEEDS FLU SHOT: ICD-10-CM

## 2017-10-04 DIAGNOSIS — E78.2 MIXED HYPERLIPIDEMIA: ICD-10-CM

## 2017-10-04 DIAGNOSIS — L29.9 ITCHING OF EAR: ICD-10-CM

## 2017-10-04 DIAGNOSIS — M79.672 BILATERAL FOOT PAIN: ICD-10-CM

## 2017-10-04 DIAGNOSIS — Z23 NEED FOR PNEUMOCOCCAL VACCINATION: ICD-10-CM

## 2017-10-04 DIAGNOSIS — M79.671 BILATERAL FOOT PAIN: ICD-10-CM

## 2017-10-04 DIAGNOSIS — E03.9 ACQUIRED HYPOTHYROIDISM: ICD-10-CM

## 2017-10-04 DIAGNOSIS — E66.9 OBESITY (BMI 30-39.9): ICD-10-CM

## 2017-10-04 PROCEDURE — 90732 PPSV23 VACC 2 YRS+ SUBQ/IM: CPT | Performed by: FAMILY MEDICINE

## 2017-10-04 PROCEDURE — G0009 ADMIN PNEUMOCOCCAL VACCINE: HCPCS | Performed by: FAMILY MEDICINE

## 2017-10-04 PROCEDURE — 99214 OFFICE O/P EST MOD 30 MIN: CPT | Mod: 25 | Performed by: FAMILY MEDICINE

## 2017-10-04 PROCEDURE — 90662 IIV NO PRSV INCREASED AG IM: CPT | Performed by: FAMILY MEDICINE

## 2017-10-04 PROCEDURE — G0008 ADMIN INFLUENZA VIRUS VAC: HCPCS | Performed by: FAMILY MEDICINE

## 2017-10-04 RX ORDER — KETOCONAZOLE 20 MG/G
1 CREAM TOPICAL 2 TIMES DAILY
Qty: 15 G | Refills: 1 | Status: SHIPPED | OUTPATIENT
Start: 2017-10-04 | End: 2018-03-13

## 2017-10-04 RX ORDER — MELOXICAM 15 MG/1
15 TABLET ORAL DAILY
COMMUNITY
End: 2018-03-13

## 2017-10-04 ASSESSMENT — PATIENT HEALTH QUESTIONNAIRE - PHQ9: CLINICAL INTERPRETATION OF PHQ2 SCORE: 0

## 2017-10-05 NOTE — PATIENT INSTRUCTIONS
Patient given written instructions regarding labs, imaging, medications, referrals, dietary and lifestyle management, and return visit.    Sen Garcia MD

## 2017-10-05 NOTE — PROGRESS NOTES
Patient comes in with several issues. She has itching in her right ear and wants me to check this.  She is seeing Dr. Baca for foot pains and he just made her some orthotics.  She is working on losing weight. I encouraged her.  She needs a flu shot.  She needs a pneumonia vaccination.    I reviewed the following    Past Medical History:   Diagnosis Date   • Pain 1/27/17    right knee   • Pain 4/22/16    low back-chronic   • Arthritis     to joints   • Disorder of thyroid     hypoactive   • High cholesterol    • Hyperlipidemia         Past Surgical History:   Procedure Laterality Date   • KNEE ARTHROSCOPY Right 1/30/2017    Procedure: KNEE ARTHROSCOPY;  Surgeon: Will Mattson M.D.;  Location: [facility];  Service:    • MEDIAL MENISCECTOMY  1/30/2017    Procedure: MEDIAL MENISCECTOMY - PARTIAL;  Surgeon: Will Mattson M.D.;  Location: [facility];  Service:    • TRIGGER FINGER RELEASE Left 4/27/2016    Procedure: TRIGGER FINGER RELEASE - THUMB;  Surgeon: Bhavin Barker M.D.;  Location: [facility];  Service:    • DEQUERVAINS RELEASE Right 3/2015    wrist   • COLONOSCOPY  2009   • SHOULDER SURGERY Left 2008    removal of bone spur left shoulder       No Known Allergies    Current Outpatient Prescriptions   Medication Sig Dispense Refill   • meloxicam (MOBIC) 15 MG tablet Take 15 mg by mouth every day.     • ketoconazole (NIZORAL) 2 % Cream Apply 1 Squirt to affected area(s) 2 times a day. Use on right external ear canal 15 g 1   • pravastatin (PRAVACHOL) 20 MG Tab Take 1 Tab by mouth every bedtime. 90 Tab 3   • levothyroxine (SYNTHROID) 50 MCG Tab TAKE ONE TABLET BY MOUTH ONCE DAILY IN THE MORNING ON  AN  EMPTY  STOMACH 90 Tab 3   • cyclobenzaprine (FLEXERIL) 10 MG Tab Take 1 Tab by mouth 3 times a day as needed. 30 Tab 3   • hydrocodone/acetaminophen (NORCO)  MG Tab Take 0.5-1 Tabs by mouth every four hours as needed. 60 Tab 0   • nystatin (MYCOSTATIN)  powder Apply to chest rash twice a day after washing 60 g 2   • ascorbic acid (ASCORBIC ACID) 500 MG Tab Take 1,000 mg by mouth every day.     • B Complex-C-Folic Acid (STRESS B COMPLEX PO) Take  by mouth 2 Times a Day.     • Red Yeast Rice Extract (RED YEAST RICE PO) Take  by mouth 2 Times a Day.     • aspirin EC (ECOTRIN) 81 MG TBEC Take 81 mg by mouth every day.     • Calcium 1500 MG TABS Take  by mouth every day.     • Magnesium 500 MG CAPS Take 875 mg by mouth every evening.     • Potassium (POTASSIMIN PO) Take 45 mg by mouth every day.     • vitamin D3, cholecalciferol, 1000 UNIT Tab Take 1 Tab by mouth every day. 100 Tab 3   • celecoxib (CELEBREX) 100 MG Cap Take 100 mg by mouth as needed.     • acetaminophen (TYLENOL) 500 MG Tab Take 500-1,000 mg by mouth 3 times a day.     • BETA CAROTENE PO Take 10,000 Int'l Units by mouth every day.     • Non Formulary Request 2 Times a Day. Zyflamend organic anti-inflammatory       No current facility-administered medications for this visit.         Family History   Problem Relation Age of Onset   • Stroke Father        Social History     Social History   • Marital status:      Spouse name: N/A   • Number of children: N/A   • Years of education: N/A     Occupational History   • Not on file.     Social History Main Topics   • Smoking status: Never Smoker   • Smokeless tobacco: Never Used   • Alcohol use No   • Drug use: No   • Sexual activity: Yes     Partners: Male     Birth control/ protection: Post-Menopausal     Other Topics Concern   • Not on file     Social History Narrative   • No narrative on file        No visits with results within 1 Month(s) from this visit.   Latest known visit with results is:   Hospital Outpatient Visit on 03/31/2017   Component Date Value   • WBC 03/31/2017 6.2    • RBC 03/31/2017 5.24    • Hemoglobin 03/31/2017 15.0    • Hematocrit 03/31/2017 46.7    • MCV 03/31/2017 89.1    • MCH 03/31/2017 28.6    • MCHC 03/31/2017 32.1*   • RDW  03/31/2017 44.0    • Platelet Count 03/31/2017 274    • MPV 03/31/2017 9.7    • Neutrophils-Polys 03/31/2017 67.50    • Lymphocytes 03/31/2017 22.70    • Monocytes 03/31/2017 7.10    • Eosinophils 03/31/2017 1.60    • Basophils 03/31/2017 0.80    • Immature Granulocytes 03/31/2017 0.30    • Nucleated RBC 03/31/2017 0.00    • Neutrophils (Absolute) 03/31/2017 4.20    • Lymphs (Absolute) 03/31/2017 1.41    • Monos (Absolute) 03/31/2017 0.44    • Eos (Absolute) 03/31/2017 0.10    • Baso (Absolute) 03/31/2017 0.05    • Immature Granulocytes (a* 03/31/2017 0.02    • NRBC (Absolute) 03/31/2017 0.00    • Sodium 03/31/2017 141    • Potassium 03/31/2017 4.6    • Chloride 03/31/2017 103    • Co2 03/31/2017 29    • Anion Gap 03/31/2017 9.0    • Glucose 03/31/2017 103*   • Bun 03/31/2017 15    • Creatinine 03/31/2017 1.03    • Calcium 03/31/2017 9.4    • AST(SGOT) 03/31/2017 21    • ALT(SGPT) 03/31/2017 24    • Alkaline Phosphatase 03/31/2017 66    • Total Bilirubin 03/31/2017 0.6    • Albumin 03/31/2017 4.0    • Total Protein 03/31/2017 7.2    • Globulin 03/31/2017 3.2    • A-G Ratio 03/31/2017 1.3    • Cholesterol,Tot 03/31/2017 168    • Triglycerides 03/31/2017 148    • HDL 03/31/2017 45    • LDL 03/31/2017 93    • TSH 03/31/2017 1.230    • Free T-4 03/31/2017 1.24    • 25-Hydroxy   Vitamin D 25 03/31/2017 60    • GFR If  03/31/2017 >60    • GFR If Non  [employer]meri* 03/31/2017 54*     Physical Exam   Constitutional: She is oriented. She appears well-developed and well-nourished. No distress.   HENT:  Head: Normocephalic and atraumatic.   Right Ear: External ear normal. Ear canal shows what appears to be a fungal infection  in the distal aspect of the canal and TM normal   Left Ear: External ear normal. Ear canal and TM normal  Nose: Nose normal.   Mouth/Throat: Oropharynx is clear and moist.   Eyes: Conjunctivae and extraocular motions are normal. Pupils are equal, round, and reactive to light. Fundi benign  bilaterally   Neck: No thyromegaly present.   Cardiovascular: Normal rate, regular rhythm, normal heart sounds and intact distal pulses.  Exam reveals no gallop.    No murmur heard.  Pulmonary/Chest: Effort normal and breath sounds normal. No respiratory distress. She has no wheezes. She has no rales.   Abdominal: Soft. Bowel sounds are normal. No hepatosplenomegaly She exhibits no distension. No tenderness. She has no rebound and no guarding.   Musculoskeletal: Normal range of motion. She exhibits no edema she is 1+ tender over her feet on the soles bilaterally.   Lymphadenopathy:     She has no cervical adenopathy.   No supraclavicular adenopathy  Neurological: She is alert and oriented. She has normal reflexes.        Babinskis downgoing bilaterally   Skin: Skin is warm and dry. No rash noted. No erythema.   Psychiatric: She has a normal mood and appropriate affect. Her behavior is normal. Judgment and thought content normal.     1. Needs flu shot  INFLUENZA VACCINE, HIGH DOSE (65+ ONLY)   2. Need for pneumococcal vaccination  PNEUMOCOCCAL POLYSACCHARIDE VACCINE 23-VALENT =>3YO SQ/IM   3. Acquired hypothyroidism   Doing well    4. Itching of ear  ketoconazole (NIZORAL) 2 % Cream--Twice a day to external ear canal on the right     Right side   5. Mixed hyperlipidemia   Doing well    6. Bilateral foot pain   I showed her how to stretch her plantar fascia. Continue to see Dr. Baca.     Sees Dr Baca   7. Obesity (BMI 30-39.9)  Patient identified as having weight management issue.  Appropriate orders and counseling given.     Recheck 5 months or when necessary    Please note that this dictation was created using voice recognition software. I have worked with consultants from the vendor as well as technical experts from [facility] to optimize the interface. I have made every reasonable attempt to correct obvious errors, but I expect that there are errors of grammar and possibly content that I did not  discover before finalizing the note.

## 2017-10-23 ENCOUNTER — TELEPHONE (OUTPATIENT)
Dept: MEDICAL GROUP | Facility: PHYSICIAN GROUP | Age: 67
End: 2017-10-23

## 2017-10-23 DIAGNOSIS — M23.203 DERANGEMENT OF MEDIAL MENISCUS OF RIGHT KNEE DUE TO OLD INJURY: ICD-10-CM

## 2017-10-23 DIAGNOSIS — Z98.890 HISTORY OF RIGHT KNEE SURGERY: ICD-10-CM

## 2017-10-23 NOTE — TELEPHONE ENCOUNTER
VOICEMAIL  1. Caller Name: Deneen Tyson                        Call Back Number: [phone] (home)       2. Message: pt is requesting a referral to Physical therapy for her R knee she thinks she needs a bit more after her surgery.         3. Patient approves office to leave a detailed voicemail/MyChart message: N\A

## 2017-11-01 DIAGNOSIS — Z98.890 HISTORY OF RIGHT KNEE SURGERY: ICD-10-CM

## 2018-02-27 ENCOUNTER — TELEPHONE (OUTPATIENT)
Dept: HEALTH INFORMATION MANAGEMENT | Facility: OTHER | Age: 68
End: 2018-02-27

## 2018-02-27 ENCOUNTER — PATIENT OUTREACH (OUTPATIENT)
Dept: HEALTH INFORMATION MANAGEMENT | Facility: OTHER | Age: 68
End: 2018-02-27

## 2018-02-27 DIAGNOSIS — M85.80 OSTEOPENIA, UNSPECIFIED LOCATION: ICD-10-CM

## 2018-02-27 NOTE — PROGRESS NOTES
Attempt #:1    [facility] Verified: yes  Verify PCP: yes    Communication Preference Obtained: yes     Review Care Team: yes    Annual Wellness Visit Scheduling  1. Scheduling Status:Not Scheduled. Patient states they are not interested          Care Gap Scheduling (Attempt to Schedule EACH Overdue Care Gap!)  Health Maintenance Due   Topic Date Due   • BONE DENSITY  SCHEDULED       [employer] Activation: already active  Servoy Andrei: no  Virtual Visits: no  Opt In to Text Messages: no

## 2018-03-13 ENCOUNTER — OFFICE VISIT (OUTPATIENT)
Dept: MEDICAL GROUP | Facility: PHYSICIAN GROUP | Age: 68
End: 2018-03-13
Payer: MEDICARE

## 2018-03-13 VITALS
HEIGHT: 64 IN | DIASTOLIC BLOOD PRESSURE: 60 MMHG | BODY MASS INDEX: 33.12 KG/M2 | OXYGEN SATURATION: 96 % | RESPIRATION RATE: 14 BRPM | TEMPERATURE: 97.5 F | SYSTOLIC BLOOD PRESSURE: 118 MMHG | WEIGHT: 194 LBS

## 2018-03-13 DIAGNOSIS — E55.9 VITAMIN D DEFICIENCY: ICD-10-CM

## 2018-03-13 DIAGNOSIS — M79.671 RIGHT FOOT PAIN: ICD-10-CM

## 2018-03-13 DIAGNOSIS — E66.9 OBESITY (BMI 30-39.9): ICD-10-CM

## 2018-03-13 DIAGNOSIS — M47.816 OSTEOARTHRITIS OF LUMBAR SPINE, UNSPECIFIED SPINAL OSTEOARTHRITIS COMPLICATION STATUS: ICD-10-CM

## 2018-03-13 DIAGNOSIS — N89.8 VAGINAL ITCHING: ICD-10-CM

## 2018-03-13 DIAGNOSIS — L21.9 SEBORRHEIC DERMATITIS OF SCALP: ICD-10-CM

## 2018-03-13 DIAGNOSIS — E78.2 MIXED HYPERLIPIDEMIA: ICD-10-CM

## 2018-03-13 PROCEDURE — 99214 OFFICE O/P EST MOD 30 MIN: CPT | Performed by: FAMILY MEDICINE

## 2018-03-13 RX ORDER — CLOTRIMAZOLE AND BETAMETHASONE DIPROPIONATE 10; .64 MG/G; MG/G
1 CREAM TOPICAL 2 TIMES DAILY
Qty: 15 G | Refills: 2 | Status: SHIPPED | OUTPATIENT
Start: 2018-03-13 | End: 2020-01-16

## 2018-03-13 RX ORDER — PIROXICAM 10 MG/1
10 CAPSULE ORAL DAILY
COMMUNITY
End: 2018-09-20 | Stop reason: SDUPTHER

## 2018-03-13 NOTE — PROGRESS NOTES
Patient comes in with several issues. She continues to see Dr. Baca for her ongoing right foot pain she is wearing orthotics and has recently been put on Feldene. She wonders what I think about Feldene.  I think we need to do some blood work to make sure that it's not bothering her. She says it doesn't give her any GI distress or side effects that she is aware of.  She needs a refill of some Lotrisone to use his for vaginal itching.  The patient has an itching area on top of her scalp which has not responded to over-the-counter shampoos. She has not tried Selsun Blue.  She is working on losing weight. I encouraged her.    I reviewed the following    Past Medical History:   Diagnosis Date   • Arthritis     to joints   • Disorder of thyroid     hypoactive   • High cholesterol    • Hyperlipidemia    • Pain 4/22/16    low back-chronic   • Pain 1/27/17    right knee        Past Surgical History:   Procedure Laterality Date   • KNEE ARTHROSCOPY Right 1/30/2017    Procedure: KNEE ARTHROSCOPY;  Surgeon: Will Mattson M.D.;  Location: SURGERY [facility];  Service:    • MEDIAL MENISCECTOMY  1/30/2017    Procedure: MEDIAL MENISCECTOMY - PARTIAL;  Surgeon: Will Mattson M.D.;  Location: SURGERY [facility];  Service:    • TRIGGER FINGER RELEASE Left 4/27/2016    Procedure: TRIGGER FINGER RELEASE - THUMB;  Surgeon: Bhavin Barker M.D.;  Location: SURGERY [facility];  Service:    • DEQUERVAINS RELEASE Right 3/2015    wrist   • COLONOSCOPY  2009   • SHOULDER SURGERY Left 2008    removal of bone spur left shoulder       Allergies   Allergen Reactions   • Meloxicam      nausea       Current Outpatient Prescriptions   Medication Sig Dispense Refill   • piroxicam (FELDENE) 10 MG Cap Take 10 mg by mouth every day.     • clotrimazole-betamethasone (LOTRISONE) 1-0.05 % Cream Apply 1 Squirt to affected area(s) 2 times a day. 15 g 2   • Salicylic Acid (SELSUN BLUE DEEP CLEANSING) 3 % Shampoo Shampoo  hair with this every other day 1 Bottle 3   • vitamin D3, cholecalciferol, 1000 UNIT Tab Take 1 Tab by mouth every day. 100 Tab 3   • pravastatin (PRAVACHOL) 20 MG Tab Take 1 Tab by mouth every bedtime. 90 Tab 3   • levothyroxine (SYNTHROID) 50 MCG Tab TAKE ONE TABLET BY MOUTH ONCE DAILY IN THE MORNING ON  AN  EMPTY  STOMACH 90 Tab 3   • cyclobenzaprine (FLEXERIL) 10 MG Tab Take 1 Tab by mouth 3 times a day as needed. 30 Tab 3   • hydrocodone/acetaminophen (NORCO)  MG Tab Take 0.5-1 Tabs by mouth every four hours as needed. 60 Tab 0   • ascorbic acid (ASCORBIC ACID) 500 MG Tab Take 1,000 mg by mouth every day.     • BETA CAROTENE PO Take 10,000 Int'l Units by mouth every day.     • B Complex-C-Folic Acid (STRESS B COMPLEX PO) Take  by mouth 2 Times a Day.     • Red Yeast Rice Extract (RED YEAST RICE PO) Take  by mouth 2 Times a Day.     • Calcium 1500 MG TABS Take  by mouth every day.     • Magnesium 500 MG CAPS Take 875 mg by mouth every evening.     • Potassium (POTASSIMIN PO) Take 45 mg by mouth every day.     • acetaminophen (TYLENOL) 500 MG Tab Take 500-1,000 mg by mouth 3 times a day.     • Non Formulary Request 2 Times a Day. Zyflamend organic anti-inflammatory       No current facility-administered medications for this visit.         Family History   Problem Relation Age of Onset   • Stroke Father        Social History     Social History   • Marital status:      Spouse name: N/A   • Number of children: N/A   • Years of education: N/A     Occupational History   • Not on file.     Social History Main Topics   • Smoking status: Never Smoker   • Smokeless tobacco: Never Used   • Alcohol use No   • Drug use: No   • Sexual activity: Yes     Partners: Male     Birth control/ protection: Post-Menopausal     Other Topics Concern   • Not on file     Social History Narrative   • No narrative on file      Physical Exam   Constitutional: She is oriented. She appears well-developed and obese. No distress.    HENT:  Head: Normocephalic and atraumatic.   Right Ear: External ear normal. Ear canal and TM normal   Left Ear: External ear normal. Ear canal and TM normal  Nose: Nose normal.   Mouth/Throat: Oropharynx is clear and moist.   Eyes: Conjunctivae and extraocular motions are normal. Pupils are equal, round, and reactive to light. Fundi benign bilaterally   Neck: No thyromegaly present.   Cardiovascular: Normal rate, regular rhythm, normal heart sounds and intact distal pulses.  Exam reveals no gallop.    No murmur heard.  Pulmonary/Chest: Effort normal and breath sounds normal. No respiratory distress. She has no wheezes. She has no rales.   Abdominal: Soft. Bowel sounds are normal. No hepatosplenomegaly She exhibits no distension. No tenderness. She has no rebound and no guarding.   Musculoskeletal: Normal range of motion. She exhibits no edema which is 1+ tender on the lateral plantar area of her right foot. She is actually not tender over the plantar fascia itself.  Lymphadenopathy:     She has no cervical adenopathy.   No supraclavicular adenopathy  Neurological: She is alert and oriented. She has normal reflexes.        Babinskis downgoing bilaterally   Skin: Skin is warm and dry. She has seborrheic dermatitis on her scalp.  Psychiatric: She has a normal mood and appropriate affect. Her behavior is normal. Judgment and thought content normal.     1. Right foot pain --I told the patient to continue taking the Feldene because I felt that it was an excellent drug it has a good track record of safety.  CBC WITH DIFFERENTIAL   2. Osteoarthritis of lumbar spine, unspecified spinal osteoarthritis complication status  CBC WITH DIFFERENTIAL   3. Vitamin D deficiency --needs reassessment  VITAMIN D 25-HYDROXY   4. Mixed hyperlipidemia --needs reassessment  COMP METABOLIC PANEL    LIPID PROFILE   5. Vaginal itching  clotrimazole-betamethasone (LOTRISONE) 1-0.05 % Cream--refill    6. Seborrheic dermatitis of scalp   Salicylic Acid (SELSUN BLUE DEEP CLEANSING) 3 % Shampoo--OTC to be used every other day    7. Obesity (BMI 30-39.9)  Patient identified as having weight management issue.  Appropriate orders and counseling given.     Recheck 6 months or when necessary    Please note that this dictation was created using voice recognition software. I have worked with consultants from the vendor as well as technical experts from [facility] to optimize the interface. I have made every reasonable attempt to correct obvious errors, but I expect that there are errors of grammar and possibly content that I did not discover before finalizing the note.

## 2018-03-21 ENCOUNTER — HOSPITAL ENCOUNTER (OUTPATIENT)
Dept: RADIOLOGY | Facility: MEDICAL CENTER | Age: 68
End: 2018-03-21
Attending: FAMILY MEDICINE
Payer: MEDICARE

## 2018-03-21 DIAGNOSIS — M85.80 OSTEOPENIA, UNSPECIFIED LOCATION: ICD-10-CM

## 2018-03-21 PROCEDURE — 77080 DXA BONE DENSITY AXIAL: CPT

## 2018-03-21 RX ORDER — ALENDRONATE SODIUM 70 MG/1
70 TABLET ORAL
Qty: 12 TAB | Refills: 3 | Status: SHIPPED | OUTPATIENT
Start: 2018-03-21 | End: 2019-02-18 | Stop reason: SDUPTHER

## 2018-03-23 ENCOUNTER — HOSPITAL ENCOUNTER (OUTPATIENT)
Dept: LAB | Facility: MEDICAL CENTER | Age: 68
End: 2018-03-23
Attending: FAMILY MEDICINE
Payer: MEDICARE

## 2018-03-23 DIAGNOSIS — M79.671 RIGHT FOOT PAIN: ICD-10-CM

## 2018-03-23 DIAGNOSIS — M47.816 OSTEOARTHRITIS OF LUMBAR SPINE, UNSPECIFIED SPINAL OSTEOARTHRITIS COMPLICATION STATUS: ICD-10-CM

## 2018-03-23 DIAGNOSIS — E78.2 MIXED HYPERLIPIDEMIA: ICD-10-CM

## 2018-03-23 LAB
25(OH)D3 SERPL-MCNC: 67 NG/ML (ref 30–100)
ALBUMIN SERPL BCP-MCNC: 3.9 G/DL (ref 3.2–4.9)
ALBUMIN/GLOB SERPL: 1.6 G/DL
ALP SERPL-CCNC: 72 U/L (ref 30–99)
ALT SERPL-CCNC: 19 U/L (ref 2–50)
ANION GAP SERPL CALC-SCNC: 7 MMOL/L (ref 0–11.9)
AST SERPL-CCNC: 21 U/L (ref 12–45)
BASOPHILS # BLD AUTO: 0.9 % (ref 0–1.8)
BASOPHILS # BLD: 0.06 K/UL (ref 0–0.12)
BILIRUB SERPL-MCNC: 0.8 MG/DL (ref 0.1–1.5)
BUN SERPL-MCNC: 23 MG/DL (ref 8–22)
CALCIUM SERPL-MCNC: 9.5 MG/DL (ref 8.5–10.5)
CHLORIDE SERPL-SCNC: 106 MMOL/L (ref 96–112)
CHOLEST SERPL-MCNC: 180 MG/DL (ref 100–199)
CO2 SERPL-SCNC: 26 MMOL/L (ref 20–33)
CREAT SERPL-MCNC: 0.99 MG/DL (ref 0.5–1.4)
EOSINOPHIL # BLD AUTO: 0.26 K/UL (ref 0–0.51)
EOSINOPHIL NFR BLD: 4 % (ref 0–6.9)
ERYTHROCYTE [DISTWIDTH] IN BLOOD BY AUTOMATED COUNT: 45 FL (ref 35.9–50)
GLOBULIN SER CALC-MCNC: 2.5 G/DL (ref 1.9–3.5)
GLUCOSE SERPL-MCNC: 88 MG/DL (ref 65–99)
HCT VFR BLD AUTO: 47.2 % (ref 37–47)
HDLC SERPL-MCNC: 56 MG/DL
HGB BLD-MCNC: 15.1 G/DL (ref 12–16)
IMM GRANULOCYTES # BLD AUTO: 0.02 K/UL (ref 0–0.11)
IMM GRANULOCYTES NFR BLD AUTO: 0.3 % (ref 0–0.9)
LDLC SERPL CALC-MCNC: 100 MG/DL
LYMPHOCYTES # BLD AUTO: 1.71 K/UL (ref 1–4.8)
LYMPHOCYTES NFR BLD: 26.6 % (ref 22–41)
MCH RBC QN AUTO: 28.4 PG (ref 27–33)
MCHC RBC AUTO-ENTMCNC: 32 G/DL (ref 33.6–35)
MCV RBC AUTO: 88.9 FL (ref 81.4–97.8)
MONOCYTES # BLD AUTO: 0.59 K/UL (ref 0–0.85)
MONOCYTES NFR BLD AUTO: 9.2 % (ref 0–13.4)
NEUTROPHILS # BLD AUTO: 3.79 K/UL (ref 2–7.15)
NEUTROPHILS NFR BLD: 59 % (ref 44–72)
NRBC # BLD AUTO: 0 K/UL
NRBC BLD-RTO: 0 /100 WBC
PLATELET # BLD AUTO: 204 K/UL (ref 164–446)
PMV BLD AUTO: 10.4 FL (ref 9–12.9)
POTASSIUM SERPL-SCNC: 4.2 MMOL/L (ref 3.6–5.5)
PROT SERPL-MCNC: 6.4 G/DL (ref 6–8.2)
RBC # BLD AUTO: 5.31 M/UL (ref 4.2–5.4)
SODIUM SERPL-SCNC: 139 MMOL/L (ref 135–145)
TRIGL SERPL-MCNC: 122 MG/DL (ref 0–149)
WBC # BLD AUTO: 6.4 K/UL (ref 4.8–10.8)

## 2018-03-23 PROCEDURE — 80053 COMPREHEN METABOLIC PANEL: CPT

## 2018-03-23 PROCEDURE — 85025 COMPLETE CBC W/AUTO DIFF WBC: CPT

## 2018-03-23 PROCEDURE — 80061 LIPID PANEL: CPT

## 2018-03-23 PROCEDURE — 36415 COLL VENOUS BLD VENIPUNCTURE: CPT

## 2018-03-23 PROCEDURE — 82306 VITAMIN D 25 HYDROXY: CPT

## 2018-04-04 ENCOUNTER — TELEPHONE (OUTPATIENT)
Dept: MEDICAL GROUP | Facility: PHYSICIAN GROUP | Age: 68
End: 2018-04-04

## 2018-04-04 DIAGNOSIS — L21.9 SEBORRHEIC DERMATITIS OF SCALP: ICD-10-CM

## 2018-04-04 RX ORDER — TRIAMCINOLONE ACETONIDE 1 MG/ML
LOTION TOPICAL
Qty: 60 ML | Refills: 2 | Status: SHIPPED | OUTPATIENT
Start: 2018-04-04 | End: 2018-09-20 | Stop reason: SDUPTHER

## 2018-04-04 NOTE — TELEPHONE ENCOUNTER
She means triamcinolone lotion. I sent this to [facility] for her. She applies it to her scalp after she shampoos with Selsun Blue shampoo.Sen Garcia MD

## 2018-04-04 NOTE — TELEPHONE ENCOUNTER
Pt has been using the shampoo for her itching scalp and it helps some. She is asking for the prescription that you said she could try if this did not work for her please.

## 2018-04-10 DIAGNOSIS — E03.8 OTHER SPECIFIED HYPOTHYROIDISM: ICD-10-CM

## 2018-04-10 RX ORDER — LEVOTHYROXINE SODIUM 0.05 MG/1
TABLET ORAL
Qty: 90 TAB | Refills: 3 | Status: SHIPPED | OUTPATIENT
Start: 2018-04-10 | End: 2019-05-29 | Stop reason: SDUPTHER

## 2018-07-31 ENCOUNTER — OFFICE VISIT (OUTPATIENT)
Dept: MEDICAL GROUP | Facility: PHYSICIAN GROUP | Age: 68
End: 2018-07-31
Payer: MEDICARE

## 2018-07-31 VITALS
SYSTOLIC BLOOD PRESSURE: 118 MMHG | HEART RATE: 68 BPM | HEIGHT: 64 IN | OXYGEN SATURATION: 95 % | BODY MASS INDEX: 33.97 KG/M2 | TEMPERATURE: 96.1 F | DIASTOLIC BLOOD PRESSURE: 62 MMHG | WEIGHT: 199 LBS | RESPIRATION RATE: 12 BRPM

## 2018-07-31 DIAGNOSIS — R93.7 ABNORMAL MRI, LUMBAR SPINE: ICD-10-CM

## 2018-07-31 DIAGNOSIS — M47.816 OSTEOARTHRITIS OF LUMBAR SPINE, UNSPECIFIED SPINAL OSTEOARTHRITIS COMPLICATION STATUS: ICD-10-CM

## 2018-07-31 PROCEDURE — 99213 OFFICE O/P EST LOW 20 MIN: CPT | Performed by: FAMILY MEDICINE

## 2018-07-31 RX ORDER — PREDNISONE 10 MG/1
TABLET ORAL
Qty: 35 TAB | Refills: 0 | Status: SHIPPED | OUTPATIENT
Start: 2018-07-31 | End: 2019-07-08

## 2018-07-31 RX ORDER — TIZANIDINE 4 MG/1
4 TABLET ORAL EVERY 6 HOURS PRN
Qty: 30 TAB | Refills: 3 | Status: SHIPPED | OUTPATIENT
Start: 2018-07-31 | End: 2019-07-17 | Stop reason: SDUPTHER

## 2018-07-31 NOTE — PATIENT INSTRUCTIONS
Patient given written instructions regarding  imaging, medications, referrals, dietary and lifestyle management, and return visit.    Sen Garcia MD

## 2018-07-31 NOTE — PROGRESS NOTES
Patient comes in complaining of lower back pain which radiates from her left pelvis into the left lateral thigh.  She has a past history of problems with L3-4 and L4-5 discs.  She has been seen at [facility] in the past for this.  Her last MRI was about 4 years ago.  She denies any specific injury to her back at this point.    I reviewed the following    Past Medical History:   Diagnosis Date   • Arthritis     to joints   • Disorder of thyroid     hypoactive   • High cholesterol    • Hyperlipidemia    • Pain 4/22/16    low back-chronic   • Pain 1/27/17    right knee        Past Surgical History:   Procedure Laterality Date   • KNEE ARTHROSCOPY Right 1/30/2017    Procedure: KNEE ARTHROSCOPY;  Surgeon: Will Mattson M.D.;  Location: SURGERY [facility];  Service:    • MEDIAL MENISCECTOMY  1/30/2017    Procedure: MEDIAL MENISCECTOMY - PARTIAL;  Surgeon: Will Mattson M.D.;  Location: SURGERY [facility];  Service:    • TRIGGER FINGER RELEASE Left 4/27/2016    Procedure: TRIGGER FINGER RELEASE - THUMB;  Surgeon: Bhavin Barker M.D.;  Location: SURGERY [facility];  Service:    • DEQUERVAINS RELEASE Right 3/2015    wrist   • COLONOSCOPY  2009   • SHOULDER SURGERY Left 2008    removal of bone spur left shoulder       Allergies   Allergen Reactions   • Meloxicam      nausea       Current Outpatient Prescriptions   Medication Sig Dispense Refill   • predniSONE (DELTASONE) 10 MG Tab Take with food. Take 6 pills on day 1, then 5 pills on days 2 and 3, then 4 pills on days 4 and 5, then 3 pills on days 6 and 7, then 2 pills on days 8 and 9, then 1 pill on day 10, then stop. 35 Tab 0   • tizanidine (ZANAFLEX) 4 MG Tab Take 1 Tab by mouth every 6 hours as needed. For muscle spasm 30 Tab 3   • pravastatin (PRAVACHOL) 20 MG Tab TAKE ONE TABLET BY MOUTH ONCE DAILY AT BEDTIME 90 Tab 3   • levothyroxine (SYNTHROID) 50 MCG Tab TAKE ONE TABLET BY MOUTH ONCE DAILY IN THE MORNING ON AN EMPTY  STOMACH 90 Tab 3   • alendronate (FOSAMAX) 70 MG Tab Take 1 Tab by mouth every 7 days. Take one tablet a week on an empty stomach with a full glass of water. Remain upright. 12 Tab 3   • piroxicam (FELDENE) 10 MG Cap Take 10 mg by mouth every day.     • cyclobenzaprine (FLEXERIL) 10 MG Tab Take 1 Tab by mouth 3 times a day as needed. 30 Tab 3   • hydrocodone/acetaminophen (NORCO)  MG Tab Take 0.5-1 Tabs by mouth every four hours as needed. 60 Tab 0   • ascorbic acid (ASCORBIC ACID) 500 MG Tab Take 1,000 mg by mouth every day.     • BETA CAROTENE PO Take 10,000 Int'l Units by mouth every day.     • B Complex-C-Folic Acid (STRESS B COMPLEX PO) Take  by mouth 2 Times a Day.     • Red Yeast Rice Extract (RED YEAST RICE PO) Take  by mouth 2 Times a Day.     • Calcium 1500 MG TABS Take  by mouth every day.     • Magnesium 500 MG CAPS Take 875 mg by mouth every evening.     • Potassium (POTASSIMIN PO) Take 45 mg by mouth every day.     • triamcinolone (KENALOG) 0.1 % lotion Apply to scalp after shampooing with Selsun Blue shampoo 60 mL 2   • clotrimazole-betamethasone (LOTRISONE) 1-0.05 % Cream Apply 1 Squirt to affected area(s) 2 times a day. 15 g 2   • Salicylic Acid (SELSUN BLUE DEEP CLEANSING) 3 % Shampoo Shampoo hair with this every other day 1 Bottle 3   • vitamin D3, cholecalciferol, 1000 UNIT Tab Take 1 Tab by mouth every day. 100 Tab 3   • acetaminophen (TYLENOL) 500 MG Tab Take 500-1,000 mg by mouth 3 times a day.     • Non Formulary Request 2 Times a Day. Zyflamend organic anti-inflammatory       No current facility-administered medications for this visit.         Family History   Problem Relation Age of Onset   • Stroke Father        Social History     Social History   • Marital status:      Spouse name: N/A   • Number of children: N/A   • Years of education: N/A     Occupational History   • Not on file.     Social History Main Topics   • Smoking status: Never Smoker   • Smokeless tobacco: Never  Used   • Alcohol use No   • Drug use: No   • Sexual activity: Yes     Partners: Male     Birth control/ protection: Post-Menopausal     Other Topics Concern   • Not on file     Social History Narrative   • No narrative on file      The patient is well-developed well-nourished and in no acute distress    Pupils equally round and reactive to light    Ears normal    Nose normal    Throat clear    Neck supple no cervical adenopathy no thyromegaly    Chest clear to auscultation    Heart regular rhythm no murmur S3 or S4 appreciated    Back 2+ tender in the left SI joint area and also the left lateral pelvis area.  Straight leg raising negative bilaterally    Abdomen flat soft good bowel sounds no mass No hepatosplenomegaly no rebound    Skin no rashes or suspicious lesions    DTRs 2+ in ankles knees and biceps    Babinski downgoing bilaterally    Pulses 2+ in all 4 extremities    1. Abnormal MRI, lumbar spine  MR-LUMBAR SPINE-W/O    predniSONE (DELTASONE) 10 MG Tab--taper from 60 mg a day down to 0/10 days    tizanidine (ZANAFLEX) 4 MG Tab--1 every 6 hours as needed spasm    REFERRAL TO NEUROSURGERY--[facility]   2. Osteoarthritis of lumbar spine, unspecified spinal osteoarthritis complication status  predniSONE (DELTASONE) 10 MG Tab    REFERRAL TO NEUROSURGERY     Recheck as needed above results    Please note that this dictation was created using voice recognition software. I have worked with consultants from the vendor as well as technical experts from [facility] [employer] to optimize the interface. I have made every reasonable attempt to correct obvious errors, but I expect that there are errors of grammar and possibly content that I did not discover before finalizing the note.

## 2018-08-01 ENCOUNTER — APPOINTMENT (OUTPATIENT)
Dept: RADIOLOGY | Facility: MEDICAL CENTER | Age: 68
End: 2018-08-01
Attending: FAMILY MEDICINE
Payer: MEDICARE

## 2018-08-01 DIAGNOSIS — R93.7 ABNORMAL MRI, LUMBAR SPINE: ICD-10-CM

## 2018-08-01 PROCEDURE — 72148 MRI LUMBAR SPINE W/O DYE: CPT

## 2018-08-24 ENCOUNTER — HOSPITAL ENCOUNTER (OUTPATIENT)
Dept: RADIOLOGY | Facility: MEDICAL CENTER | Age: 68
End: 2018-08-24
Attending: ORTHOPAEDIC SURGERY
Payer: MEDICARE

## 2018-08-24 DIAGNOSIS — M76.71 PERONEAL TENDINITIS OF RIGHT LOWER EXTREMITY: ICD-10-CM

## 2018-08-24 DIAGNOSIS — M25.571 RIGHT ANKLE PAIN, UNSPECIFIED CHRONICITY: ICD-10-CM

## 2018-08-24 PROCEDURE — 20605 DRAIN/INJ JOINT/BURSA W/O US: CPT | Mod: RT

## 2018-08-24 PROCEDURE — 700101 HCHG RX REV CODE 250

## 2018-08-24 PROCEDURE — 700111 HCHG RX REV CODE 636 W/ 250 OVERRIDE (IP)

## 2018-08-24 PROCEDURE — 700117 HCHG RX CONTRAST REV CODE 255: Performed by: ORTHOPAEDIC SURGERY

## 2018-08-24 RX ORDER — TRIAMCINOLONE ACETONIDE 40 MG/ML
INJECTION, SUSPENSION INTRA-ARTICULAR; INTRAMUSCULAR
Status: DISPENSED
Start: 2018-08-24 | End: 2018-08-24

## 2018-08-24 RX ORDER — BUPIVACAINE HYDROCHLORIDE 5 MG/ML
INJECTION, SOLUTION EPIDURAL; INTRACAUDAL
Status: DISPENSED
Start: 2018-08-24 | End: 2018-08-24

## 2018-08-24 RX ORDER — LIDOCAINE HYDROCHLORIDE 20 MG/ML
INJECTION, SOLUTION INFILTRATION; PERINEURAL
Status: DISPENSED
Start: 2018-08-24 | End: 2018-08-24

## 2018-08-24 RX ORDER — LIDOCAINE HYDROCHLORIDE 10 MG/ML
INJECTION, SOLUTION INFILTRATION; PERINEURAL
Status: DISPENSED
Start: 2018-08-24 | End: 2018-08-24

## 2018-08-24 RX ADMIN — IOHEXOL 1 ML: 300 INJECTION, SOLUTION INTRAVENOUS at 11:10

## 2018-08-24 NOTE — OR SURGEON
Immediate Post- Operative Note        PostOp Diagnosis: right foot pain       Procedure(s): fluoroscopic-guided therapeutic injection of the right calcaneocuboid joint      Estimated Blood Loss: Less than 5 ml        Complications: None            8/24/2018     12:29 PM     Andrew Rayo

## 2018-09-20 ENCOUNTER — OFFICE VISIT (OUTPATIENT)
Dept: MEDICAL GROUP | Facility: PHYSICIAN GROUP | Age: 68
End: 2018-09-20
Payer: MEDICARE

## 2018-09-20 VITALS
DIASTOLIC BLOOD PRESSURE: 62 MMHG | WEIGHT: 192 LBS | TEMPERATURE: 97 F | HEIGHT: 64 IN | HEART RATE: 79 BPM | BODY MASS INDEX: 32.78 KG/M2 | SYSTOLIC BLOOD PRESSURE: 118 MMHG | OXYGEN SATURATION: 97 % | RESPIRATION RATE: 14 BRPM

## 2018-09-20 DIAGNOSIS — E78.2 MIXED HYPERLIPIDEMIA: ICD-10-CM

## 2018-09-20 DIAGNOSIS — L21.9 SEBORRHEIC DERMATITIS OF SCALP: ICD-10-CM

## 2018-09-20 DIAGNOSIS — E03.9 HYPOTHYROIDISM, UNSPECIFIED TYPE: ICD-10-CM

## 2018-09-20 DIAGNOSIS — M47.816 OSTEOARTHRITIS OF LUMBAR SPINE, UNSPECIFIED SPINAL OSTEOARTHRITIS COMPLICATION STATUS: ICD-10-CM

## 2018-09-20 DIAGNOSIS — R93.7 ABNORMAL MRI, LUMBAR SPINE: ICD-10-CM

## 2018-09-20 PROCEDURE — 99214 OFFICE O/P EST MOD 30 MIN: CPT | Performed by: FAMILY MEDICINE

## 2018-09-20 RX ORDER — TRIAMCINOLONE ACETONIDE 1 MG/ML
LOTION TOPICAL
Qty: 60 ML | Refills: 11 | Status: SHIPPED | OUTPATIENT
Start: 2018-09-20 | End: 2020-01-10

## 2018-09-20 RX ORDER — PIROXICAM 10 MG/1
10 CAPSULE ORAL DAILY
Qty: 90 CAP | Refills: 3 | Status: SHIPPED | OUTPATIENT
Start: 2018-09-20 | End: 2019-10-07

## 2018-09-20 ASSESSMENT — PATIENT HEALTH QUESTIONNAIRE - PHQ9: CLINICAL INTERPRETATION OF PHQ2 SCORE: 0

## 2018-09-21 NOTE — PROGRESS NOTES
Patient : Carlos Yoo Age: 54 year old Sex: female   MRN: [id number] Encounter Date: 6/16/2017      History     No chief complaint on file.    HPI Comments: 54 Y F with morbid obesity and DM presenting with right leg pain and swelling. Patient reports symptoms x 3 days. She noticed pain and tenderness in her right calf. No injury or trauma. 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 No history of DVT or PE. Patient is a daily smoker. She denies any hormone use. She also denies any chest pain, palpations, or SOB.     The history is provided by the patient.       ALLERGIES:   Allergen Reactions   • Allergy ERYTHEMA     Surgical metal, staples, not clips  Red, infected   • Nickel      Red, rash   • Sulfa Drugs Cross Reactors      Itching with urination       Prior to Admission Medications    AMLODIPINE (NORVASC) 10 MG TABLET    Take 1 tablet by mouth daily.    ASPIRIN 325 MG TABLET    Take 1 tablet by mouth daily.    CANAGLIFLOZIN (INVOKANA) 100 MG TABLET    Take 100 mg by mouth daily (before breakfast).    CHOLECALCIFEROL PO    Take  by mouth daily.    CLONIDINE (CATAPRES) 0.1 MG TABLET    Take 0.1 mg by mouth daily as needed.    CLOPIDOGREL (PLAVIX) 75 MG TABLET    Take 75 mg by mouth daily.    LISINOPRIL-HYDROCHLOROTHIAZIDE (PRINZIDE,ZESTORETIC) 20-25 MG PER TABLET    take 1 tablet by mouth daily    METFORMIN (GLUCOPHAGE) 500 MG TABLET    TAKE ONE TABLET BY MOUTH TWICE A DAY WITH MEALS    METOPROLOL (TOPROL-XL) 50 MG 24 HR TABLET    Take 1 tablet by mouth daily.    OMEPRAZOLE (PRILOSEC) 20 MG CAPSULE    TAKE ONE CAPSULE BY MOUTH DAILY    OMEPRAZOLE (PRILOSEC) 20 MG CAPSULE    Take 1 capsule by mouth daily. DUE FOR APPOINTMENT BEFORE ANY REFILLS    SIMVASTATIN (ZOCOR) 5 MG TABLET    Take 1 tablet by mouth daily    TRIAMCINOLONE (ARISTOCORT) 0.1 % CREAM    Apply bid       New Prescriptions    RIVAROXABAN (XARELTO) 15 MG TAB    Take 1 tablet by mouth 2 times daily (with meals).       Past Medical History:  Patient comes in requesting a refill of piroxicam which is working well for the arthritis in her low back in other joints.  She needs to have some lab work done.  She needs a refill of triamcinolone lotion which has helped with the seborrheic dermatitis of her scalp.  She has no chest pains and no shortness of breath.    I reviewed the following    Past Medical History:   Diagnosis Date   • Arthritis     to joints   • Disorder of thyroid     hypoactive   • High cholesterol    • Hyperlipidemia    • Pain 4/22/16    low back-chronic   • Pain 1/27/17    right knee        Past Surgical History:   Procedure Laterality Date   • KNEE ARTHROSCOPY Right 1/30/2017    Procedure: KNEE ARTHROSCOPY;  Surgeon: Will Mattson M.D.;  Location: SURGERY [facility];  Service:    • MEDIAL MENISCECTOMY  1/30/2017    Procedure: MEDIAL MENISCECTOMY - PARTIAL;  Surgeon: Will Mattson M.D.;  Location: SURGERY [facility];  Service:    • TRIGGER FINGER RELEASE Left 4/27/2016    Procedure: TRIGGER FINGER RELEASE - THUMB;  Surgeon: Bhavin Barker M.D.;  Location: SURGERY [facility];  Service:    • DEQUERVAINS RELEASE Right 3/2015    wrist   • COLONOSCOPY  2009   • SHOULDER SURGERY Left 2008    removal of bone spur left shoulder       Allergies   Allergen Reactions   • Meloxicam      nausea       Current Outpatient Prescriptions   Medication Sig Dispense Refill   • piroxicam (FELDENE) 10 MG Cap Take 1 Cap by mouth every day. 90 Cap 3   • triamcinolone (KENALOG) 0.1 % lotion Apply to scalp after shampooing with Selsun Blue shampoo 60 mL 11   • pravastatin (PRAVACHOL) 20 MG Tab TAKE ONE TABLET BY MOUTH ONCE DAILY AT BEDTIME 90 Tab 3   • levothyroxine (SYNTHROID) 50 MCG Tab TAKE ONE TABLET BY MOUTH ONCE DAILY IN THE MORNING ON AN EMPTY STOMACH 90 Tab 3   • alendronate (FOSAMAX) 70 MG Tab Take 1 Tab by mouth every 7 days. Take one tablet a week on an empty stomach with a full glass of water. Remain upright. 12 Tab 3    Diagnosis Date   • ARF (acute renal failure) (CMS/[facility]) 2/16/2015   • Depression    • HTN (hypertension)    • Morbid obesity (CMS/[facility])     BMI 45   • Osteoarthritis    • Type 2 diabetes mellitus (CMS/[facility])        Past Surgical History:   Procedure Laterality Date   • CARPAL TUNNEL RELEASE  08/27/2004   • CARPAL TUNNEL RELEASE  07/30/2004   • GASTRIC BYPASS  08/09/2007       History reviewed. No pertinent family history.    Social History   Substance Use Topics   • Smoking status: Current Every Day Smoker     Packs/day: 0.25     Years: 35.00     Types: Cigarettes     Start date: 1/26/1979   • Smokeless tobacco: Never Used   • Alcohol use 0.0 oz/week     2 - 3 Standard drinks or equivalent per week      Comment: wine       Review of Systems   Constitutional: Negative.    HENT: Negative.    Eyes: Negative.    Respiratory: Negative.    Cardiovascular: Negative.    Gastrointestinal: Negative.    Genitourinary: Negative.    Musculoskeletal:        Right calf pain   Skin: Negative.    Neurological: Negative.    Hematological: Negative.    Psychiatric/Behavioral: Negative.        Physical Exam       ED Triage Vitals   ED Triage Vitals Group      Temp 06/16/17 1946 98.3 °F (36.8 °C)      Pulse 06/16/17 1946 86      Resp 06/16/17 1946 16      BP 06/16/17 1946 193/97      SpO2 06/16/17 1946 94 %      EtCO2 mmHg --       Height 06/16/17 1946 5' 4\" (1.626 m)      Weight 06/16/17 1946 216 lb (98 kg)      Weight Scale Used --        Physical Exam   Constitutional: She appears well-developed and well-nourished. No distress.   Obese female in no acute distress    HENT:   Head: Normocephalic and atraumatic.   Right Ear: External ear normal.   Left Ear: External ear normal.   Nose: Nose normal.   Mouth/Throat: Oropharynx is clear and moist.   Eyes: Conjunctivae are normal.   Cardiovascular: Normal rate and regular rhythm.    Pulmonary/Chest: Effort normal and breath sounds normal. No respiratory distress. She has no wheezes. She has    • Salicylic Acid (SELSUN BLUE DEEP CLEANSING) 3 % Shampoo Shampoo hair with this every other day 1 Bottle 3   • vitamin D3, cholecalciferol, 1000 UNIT Tab Take 1 Tab by mouth every day. 100 Tab 3   • cyclobenzaprine (FLEXERIL) 10 MG Tab Take 1 Tab by mouth 3 times a day as needed. 30 Tab 3   • hydrocodone/acetaminophen (NORCO)  MG Tab Take 0.5-1 Tabs by mouth every four hours as needed. 60 Tab 0   • acetaminophen (TYLENOL) 500 MG Tab Take 500-1,000 mg by mouth 3 times a day.     • ascorbic acid (ASCORBIC ACID) 500 MG Tab Take 1,000 mg by mouth every day.     • BETA CAROTENE PO Take 10,000 Int'l Units by mouth every day.     • B Complex-C-Folic Acid (STRESS B COMPLEX PO) Take  by mouth 2 Times a Day.     • Red Yeast Rice Extract (RED YEAST RICE PO) Take  by mouth 2 Times a Day.     • Calcium 1500 MG TABS Take  by mouth every day.     • Magnesium 500 MG CAPS Take 875 mg by mouth every evening.     • Potassium (POTASSIMIN PO) Take 45 mg by mouth every day.     • predniSONE (DELTASONE) 10 MG Tab Take with food. Take 6 pills on day 1, then 5 pills on days 2 and 3, then 4 pills on days 4 and 5, then 3 pills on days 6 and 7, then 2 pills on days 8 and 9, then 1 pill on day 10, then stop. 35 Tab 0   • tizanidine (ZANAFLEX) 4 MG Tab Take 1 Tab by mouth every 6 hours as needed. For muscle spasm 30 Tab 3   • clotrimazole-betamethasone (LOTRISONE) 1-0.05 % Cream Apply 1 Squirt to affected area(s) 2 times a day. 15 g 2   • Non Formulary Request 2 Times a Day. Zyflamend organic anti-inflammatory       No current facility-administered medications for this visit.         Family History   Problem Relation Age of Onset   • Stroke Father        Social History     Social History   • Marital status:      Spouse name: N/A   • Number of children: N/A   • Years of education: N/A     Occupational History   • Not on file.     Social History Main Topics   • Smoking status: Never Smoker   • Smokeless tobacco: Never Used   •  Alcohol use No   • Drug use: No   • Sexual activity: Yes     Partners: Male     Birth control/ protection: Post-Menopausal     Other Topics Concern   • Not on file     Social History Narrative   • No narrative on file      Physical Exam   Constitutional: She is oriented. She appears well-developed and well-nourished. No distress.   HENT:  Head: Normocephalic and atraumatic.   Right Ear: External ear normal. Ear canal and TM normal   Left Ear: External ear normal. Ear canal and TM normal  Nose: Nose normal.   Mouth/Throat: Oropharynx is clear and moist.   Eyes: Conjunctivae and extraocular motions are normal. Pupils are equal, round, and reactive to light. Fundi benign bilaterally   Neck: No thyromegaly present.   Cardiovascular: Normal rate, regular rhythm, normal heart sounds and intact distal pulses.  Exam reveals no gallop.    No murmur heard.  Pulmonary/Chest: Effort normal and breath sounds normal. No respiratory distress. She has no wheezes. She has no rales.   Abdominal: Soft. Bowel sounds are normal. No hepatosplenomegaly She exhibits no distension. No tenderness. She has no rebound and no guarding.   Musculoskeletal: Normal range of motion. She exhibits no edema and no tenderness.   Lymphadenopathy:     She has no cervical adenopathy.   No supraclavicular adenopathy  Neurological: She is alert and oriented. She has normal reflexes.        Babinskis downgoing bilaterally   Skin: Skin is warm and dry. No rash noted. No erythema.   Psychiatric: She has a normal mood and appropriate affect. Her behavior is normal. Judgment and thought content normal.    1. Osteoarthritis of lumbar spine, unspecified spinal osteoarthritis complication status  piroxicam (FELDENE) 10 MG Cap--3 months with 3 refills   2. Abnormal MRI, lumbar spine   this is improved because the patient no longer has pain in her back she says thanks to piroxicam   3. Hypothyroidism, unspecified type --needs reassessment TSH    FREE THYROXINE   4.  no rales.   Abdominal: Soft. Bowel sounds are normal. She exhibits no distension. There is no tenderness. There is no rebound and no guarding.   Musculoskeletal:        Right lower leg: She exhibits tenderness. She exhibits no bony tenderness, no swelling, no edema, no deformity and no laceration.   Right calf tenderness  Right leg is larger then left leg  No pitting edema  2+ DP pulses bilaterally    Neurological: She is alert.   Skin: Skin is warm and dry. She is not diaphoretic.   Psychiatric: She has a normal mood and affect.   Nursing note and vitals reviewed.        ED Course     Procedures: none    Lab Results     No results found for this visit on 06/16/17.    Radiology Results     Imaging Results         US Lower Extremity Venous Duplex Right (Final result) Result time:  06/16/17 20:27:46    Final result    Impression:    IMPRESSION:  Acute DVT involving the right mid femoral, distal femoral and  popliteal vein.      Narrative:    US LOWER EXTREMITY VENOUS DUPLEX RIGHT    HISTORY:   right leg pain and swelling.    COMPARISONS:  None.    FINDINGS:      Compression, color Doppler and spectral analysis of the right lower  extremity deep venous circulation demonstrates partial compression  involving the mid and distal femoral veins and no compression of the  popliteal vein. The peroneal veins could not be identified. Compression of  the posterior tibial veins was noted.                  ED Medication Orders     Start Ordered     Status Ordering Provider    06/16/17 2100 06/16/17 2040  rivaroxaban (XARELTO) tablet 15 mg  ONCE      Acknowledged ZAIRE WILLAMS  Most concerned for DVT. Patient well appearing on exam. Vitals all WNL. Denies any chest pain or SOB, no symptoms consistent with PE. RLE US showed acute DVT of right mid & distal femoral and popliteal vein. Confirmed patient is no longer taking Plavix and has not taken it in months. Reviewed last BMP and CrCl 76. Will treat with Xeralto. Since  Mixed hyperlipidemia --needs reassessment CBC WITH DIFFERENTIAL    COMP METABOLIC PANEL    LIPID PROFILE   5. Seborrheic dermatitis of scalp  triamcinolone (KENALOG) 0.1 % lotion     Please note that this dictation was created using voice recognition software. I have worked with consultants from the vendor as well as technical experts from [facility] to optimize the interface. I have made every reasonable attempt to correct obvious errors, but I expect that there are errors of grammar and possibly content that I did not discover before finalizing the note.     patient well appearing and hemodynamically normal, appropriate for outpatient therapy. Discussed smoking cessation, PCP follow up, and return instructions. Patient in agreement with plan.     Clinical Impression     ED Diagnosis        Final diagnosis    Acute deep vein thrombosis (DVT) of femoral vein of right lower extremity (CMS/HCC)           Disposition        Discharge  Carlos Yoo discharge to home/self care.             Khalida Lynch MD  06/16/17 [id number]

## 2018-09-22 ENCOUNTER — HOSPITAL ENCOUNTER (OUTPATIENT)
Dept: LAB | Facility: MEDICAL CENTER | Age: 68
End: 2018-09-22
Attending: FAMILY MEDICINE
Payer: MEDICARE

## 2018-09-22 DIAGNOSIS — E78.2 MIXED HYPERLIPIDEMIA: ICD-10-CM

## 2018-09-22 DIAGNOSIS — E03.9 HYPOTHYROIDISM, UNSPECIFIED TYPE: ICD-10-CM

## 2018-09-22 LAB
ALBUMIN SERPL BCP-MCNC: 4 G/DL (ref 3.2–4.9)
ALBUMIN/GLOB SERPL: 1.3 G/DL
ALP SERPL-CCNC: 61 U/L (ref 30–99)
ALT SERPL-CCNC: 22 U/L (ref 2–50)
ANION GAP SERPL CALC-SCNC: 6 MMOL/L (ref 0–11.9)
AST SERPL-CCNC: 20 U/L (ref 12–45)
BASOPHILS # BLD AUTO: 1.1 % (ref 0–1.8)
BASOPHILS # BLD: 0.05 K/UL (ref 0–0.12)
BILIRUB SERPL-MCNC: 0.7 MG/DL (ref 0.1–1.5)
BUN SERPL-MCNC: 17 MG/DL (ref 8–22)
CALCIUM SERPL-MCNC: 9.6 MG/DL (ref 8.5–10.5)
CHLORIDE SERPL-SCNC: 106 MMOL/L (ref 96–112)
CHOLEST SERPL-MCNC: 209 MG/DL (ref 100–199)
CO2 SERPL-SCNC: 29 MMOL/L (ref 20–33)
CREAT SERPL-MCNC: 1.18 MG/DL (ref 0.5–1.4)
EOSINOPHIL # BLD AUTO: 0.15 K/UL (ref 0–0.51)
EOSINOPHIL NFR BLD: 3.2 % (ref 0–6.9)
ERYTHROCYTE [DISTWIDTH] IN BLOOD BY AUTOMATED COUNT: 46.5 FL (ref 35.9–50)
GLOBULIN SER CALC-MCNC: 3.2 G/DL (ref 1.9–3.5)
GLUCOSE SERPL-MCNC: 88 MG/DL (ref 65–99)
HCT VFR BLD AUTO: 49.9 % (ref 37–47)
HDLC SERPL-MCNC: 63 MG/DL
HGB BLD-MCNC: 16 G/DL (ref 12–16)
IMM GRANULOCYTES # BLD AUTO: 0.02 K/UL (ref 0–0.11)
IMM GRANULOCYTES NFR BLD AUTO: 0.4 % (ref 0–0.9)
LDLC SERPL CALC-MCNC: 115 MG/DL
LYMPHOCYTES # BLD AUTO: 1.16 K/UL (ref 1–4.8)
LYMPHOCYTES NFR BLD: 24.9 % (ref 22–41)
MCH RBC QN AUTO: 28.7 PG (ref 27–33)
MCHC RBC AUTO-ENTMCNC: 32.1 G/DL (ref 33.6–35)
MCV RBC AUTO: 89.4 FL (ref 81.4–97.8)
MONOCYTES # BLD AUTO: 0.42 K/UL (ref 0–0.85)
MONOCYTES NFR BLD AUTO: 9 % (ref 0–13.4)
NEUTROPHILS # BLD AUTO: 2.85 K/UL (ref 2–7.15)
NEUTROPHILS NFR BLD: 61.4 % (ref 44–72)
NRBC # BLD AUTO: 0 K/UL
NRBC BLD-RTO: 0 /100 WBC
PLATELET # BLD AUTO: 205 K/UL (ref 164–446)
PMV BLD AUTO: 10.5 FL (ref 9–12.9)
POTASSIUM SERPL-SCNC: 5 MMOL/L (ref 3.6–5.5)
PROT SERPL-MCNC: 7.2 G/DL (ref 6–8.2)
RBC # BLD AUTO: 5.58 M/UL (ref 4.2–5.4)
SODIUM SERPL-SCNC: 141 MMOL/L (ref 135–145)
T4 FREE SERPL-MCNC: 1.25 NG/DL (ref 0.53–1.43)
TRIGL SERPL-MCNC: 155 MG/DL (ref 0–149)
TSH SERPL DL<=0.005 MIU/L-ACNC: 2.47 UIU/ML (ref 0.38–5.33)
WBC # BLD AUTO: 4.7 K/UL (ref 4.8–10.8)

## 2018-09-22 PROCEDURE — 85025 COMPLETE CBC W/AUTO DIFF WBC: CPT

## 2018-09-22 PROCEDURE — 84439 ASSAY OF FREE THYROXINE: CPT

## 2018-09-22 PROCEDURE — 80061 LIPID PANEL: CPT

## 2018-09-22 PROCEDURE — 36415 COLL VENOUS BLD VENIPUNCTURE: CPT

## 2018-09-22 PROCEDURE — 80053 COMPREHEN METABOLIC PANEL: CPT

## 2018-09-22 PROCEDURE — 84443 ASSAY THYROID STIM HORMONE: CPT

## 2018-09-23 DIAGNOSIS — E78.2 MIXED HYPERLIPIDEMIA: ICD-10-CM

## 2018-09-23 RX ORDER — PRAVASTATIN SODIUM 40 MG
TABLET ORAL
Qty: 90 TAB | Refills: 3 | Status: SHIPPED | OUTPATIENT
Start: 2018-09-23 | End: 2019-04-09 | Stop reason: SDUPTHER

## 2018-11-16 ENCOUNTER — PATIENT OUTREACH (OUTPATIENT)
Dept: HEALTH INFORMATION MANAGEMENT | Facility: OTHER | Age: 68
End: 2018-11-16

## 2018-12-04 ENCOUNTER — NON-PROVIDER VISIT (OUTPATIENT)
Dept: MEDICAL GROUP | Facility: PHYSICIAN GROUP | Age: 68
End: 2018-12-04
Payer: MEDICARE

## 2018-12-04 DIAGNOSIS — Z23 NEEDS FLU SHOT: ICD-10-CM

## 2018-12-04 PROCEDURE — 90662 IIV NO PRSV INCREASED AG IM: CPT | Performed by: FAMILY MEDICINE

## 2018-12-04 PROCEDURE — G0008 ADMIN INFLUENZA VIRUS VAC: HCPCS | Performed by: FAMILY MEDICINE

## 2018-12-05 NOTE — PROGRESS NOTES
"Deneen Tyson is a 68 y.o. female here for a non-provider visit for:   FLU    Reason for immunization: Annual Flu Vaccine  Immunization records indicate need for vaccine: Yes, confirmed with Epic  Minimum interval has been met for this vaccine: Yes  ABN completed: Not Indicated    Order and dose verified by: nelson    VIS Dated  8/7/15 was given to patient: Yes  All IAC Questionnaire questions were answered \"No.\"    Patient tolerated injection and no adverse effects were observed or reported: Yes    Pt scheduled for next dose in series: Not Indicated    "

## 2019-01-11 ENCOUNTER — TELEPHONE (OUTPATIENT)
Dept: MEDICAL GROUP | Facility: PHYSICIAN GROUP | Age: 69
End: 2019-01-11

## 2019-01-11 NOTE — TELEPHONE ENCOUNTER
Future Appointments       Provider Department Center    1/14/2019 1:20 PM ADENIKE Hdez [facility] [place]          ESTABLISHED PATIENT PRE-VISIT PLANNING     Note: Patient will not be contacted if there is no indication to call. PT was not Contacted.    1.    Reviewed note from last office visit with PCP: YES Last office visit: 9/20/18    2.  If any orders were placed at last visit, do we have Results/Consult Notes?        •  Labs - Labs were not ordered at last office visit.        •  Imaging - Imaging was not ordered at last office visit.        •  Referrals - No referrals were ordered at last office visit.     3.  Immunizations were updated in Epic using WebIZ?: Yes       •  Web Iz Recommendations: FLU, HEPATITIS A , MMR  and SHINGRIX (Shingles)    4.  Patient is due for the following Health Maintenance Topics:   Health Maintenance Due   Topic Date Due   • Annual Wellness Visit  1950   • IMM ZOSTER VACCINES (2 of 3) 05/22/2012   • MAMMOGRAM  08/19/2016       - Patient declines Annual Wellness Visit (AWV) and Mammogram.    5.  Patient was not informed to arrive 15 min prior to their scheduled appointment and bring in their medication bottles.

## 2019-01-14 ENCOUNTER — OFFICE VISIT (OUTPATIENT)
Dept: MEDICAL GROUP | Facility: PHYSICIAN GROUP | Age: 69
End: 2019-01-14
Payer: MEDICARE

## 2019-01-14 VITALS
RESPIRATION RATE: 16 BRPM | TEMPERATURE: 97.8 F | DIASTOLIC BLOOD PRESSURE: 78 MMHG | OXYGEN SATURATION: 99 % | BODY MASS INDEX: 35.51 KG/M2 | HEIGHT: 64 IN | WEIGHT: 208 LBS | HEART RATE: 74 BPM | SYSTOLIC BLOOD PRESSURE: 116 MMHG

## 2019-01-14 DIAGNOSIS — Z04.3 ENCOUNTER FOR POST FALL EXAMINATION: ICD-10-CM

## 2019-01-14 PROCEDURE — 8041 PR SCP AHA: Performed by: NURSE PRACTITIONER

## 2019-01-14 PROCEDURE — 99213 OFFICE O/P EST LOW 20 MIN: CPT | Performed by: NURSE PRACTITIONER

## 2019-01-14 ASSESSMENT — PATIENT HEALTH QUESTIONNAIRE - PHQ9: CLINICAL INTERPRETATION OF PHQ2 SCORE: 0

## 2019-01-14 NOTE — ASSESSMENT & PLAN NOTE
"Patient states that her fall occurred on Thursday, 12/27/2018 at 7:30 AM.  The patient was staying at the [facility] in [facility] and was showering at the time of the fall.  The patient notes that her hotel shower did not have a non-slip pad and when she reached down to turn the faucet off her feet slipped from beneath her causing her to fall back on the edge of the tub hitting her right buttock/lower back, and then falling over the side of the tub onto the floor hitting her head on the edge of the toilet.  Patient reported after the fall her mid and lower back was achy and she had a bruise on her right buttock, right arm felt achy, she had a lump on the right side of her head that was tender, and had difficulty with walking related to back pain.  After the fall she \"felt fuzzy like she was in between asleep and awake and disoriented at times\" which lasted for about 1 day.  The patient also felt that she was disoriented during this time and was unable to drive related to her \"fuzzy feeling\".  The patient denied nausea, vomiting, vision changes, headaches after the fall.  Patient states that she did not go to the urgent care or ER while in [place] as she was flying home on 12/28/2018 and did not want to delay this return.  After returning to [place], the patient went to her chiropractor on 1/7/2019 and was told that \"things seemed pretty normal except for extra tight muscles in the thoracic back region\", did a \"small adjustment\" and massage, and recommended follow-up with a PCP.  Today, 1/14/2019 patient reports that she still has the same mid back muscle tightness, however all the other symptoms have resolved except for the right sided tender head lump.  The patient does note that she has chronic low back pain, but the thoracic back pain is new since this fall.  The patient reports that she took a previously prescribed Zanaflex 1 time after the initial injury, however she does not like to take " this medication as it sedates her.  The patient reports that she is having difficulties with sleep related to back pain.  Patient is waiting on the [employer] insurance claim regarding this incident.  The patient does feel that overall she is improving.

## 2019-01-14 NOTE — PROGRESS NOTES
"CC:   Chief Complaint   Patient presents with   • Fall     3 weeks ago       HISTORY OF THE PRESENT ILLNESS: Patient is a 68 y.o. female. This pleasant patient is here today for evaluation post fall on 12/27/2018.    Annual Health Assessment Questions:    1.  Are you currently engaging in any exercise or physical activity? Yes    2.  How would you describe your mood or emotional well-being today? good    3.  Have you had any falls in the last year? Yes    4.  Have you noticed any problems with your balance or had difficulty walking? No    5.  In the last six months have you experienced any leakage of urine? No    6. DPA/Advanced Directive: Patient does not have an Advanced Directive.  A packet and workshop information was given on Advanced Directives.    Encounter for post fall examination  Patient states that her fall occurred on Thursday, 12/27/2018 at 7:30 AM.  The patient was staying at the [facility] in [facility] and was showering at the time of the fall.  The patient notes that her hotel shower did not have a non-slip pad and when she reached down to turn the faucet off her feet slipped from beneath her causing her to fall back on the edge of the tub hitting her right buttock/lower back, and then falling over the side of the tub onto the floor hitting her head on the edge of the toilet.  Patient reported after the fall her mid and lower back was achy and she had a bruise on her right buttock, right arm felt achy, she had a lump on the right side of her head that was tender, and had difficulty with walking related to back pain.  After the fall she \"felt fuzzy like she was in between asleep and awake and disoriented at times\" which lasted for about 1 day.  The patient also felt that she was disoriented during this time and was unable to drive related to her \"fuzzy feeling\".  The patient denied nausea, vomiting, vision changes, headaches after the fall.  Patient states that she did not go to the " "urgent care or ER while in [place] as she was flying home on 12/28/2018 and did not want to delay this return.  After returning to [place], the patient went to her chiropractor on 1/7/2019 and was told that \"things seemed pretty normal except for extra tight muscles in the thoracic back region\", did a \"small adjustment\" and massage, and recommended follow-up with a PCP.  Today, 1/14/2019 patient reports that she still has the same mid back muscle tightness, however all the other symptoms have resolved except for the right sided tender head lump.  The patient does note that she has chronic low back pain, but the thoracic back pain is new since this fall.  The patient reports that she took a previously prescribed Zanaflex 1 time after the initial injury, however she does not like to take this medication as it sedates her.  The patient reports that she is having difficulties with sleep related to back pain.  Patient is waiting on the [employer] insurance claim regarding this incident.  The patient does feel that overall she is improving.    Allergies: Meloxicam    Current Outpatient Prescriptions Ordered in [facility]   Medication Sig Dispense Refill   • pravastatin (PRAVACHOL) 40 MG tablet TAKE ONE TABLET BY MOUTH ONCE DAILY AT BEDTIME 90 Tab 3   • piroxicam (FELDENE) 10 MG Cap Take 1 Cap by mouth every day. 90 Cap 3   • triamcinolone (KENALOG) 0.1 % lotion Apply to scalp after shampooing with Selsun Blue shampoo 60 mL 11   • predniSONE (DELTASONE) 10 MG Tab Take with food. Take 6 pills on day 1, then 5 pills on days 2 and 3, then 4 pills on days 4 and 5, then 3 pills on days 6 and 7, then 2 pills on days 8 and 9, then 1 pill on day 10, then stop. 35 Tab 0   • tizanidine (ZANAFLEX) 4 MG Tab Take 1 Tab by mouth every 6 hours as needed. For muscle spasm 30 Tab 3   • levothyroxine (SYNTHROID) 50 MCG Tab TAKE ONE TABLET BY MOUTH ONCE DAILY IN THE MORNING ON AN EMPTY STOMACH 90 Tab 3   • alendronate (FOSAMAX) 70 MG Tab Take 1 Tab " by mouth every 7 days. Take one tablet a week on an empty stomach with a full glass of water. Remain upright. 12 Tab 3   • clotrimazole-betamethasone (LOTRISONE) 1-0.05 % Cream Apply 1 Squirt to affected area(s) 2 times a day. 15 g 2   • Salicylic Acid (SELSUN BLUE DEEP CLEANSING) 3 % Shampoo Shampoo hair with this every other day 1 Bottle 3   • vitamin D3, cholecalciferol, 1000 UNIT Tab Take 1 Tab by mouth every day. 100 Tab 3   • cyclobenzaprine (FLEXERIL) 10 MG Tab Take 1 Tab by mouth 3 times a day as needed. 30 Tab 3   • hydrocodone/acetaminophen (NORCO)  MG Tab Take 0.5-1 Tabs by mouth every four hours as needed. 60 Tab 0   • acetaminophen (TYLENOL) 500 MG Tab Take 500-1,000 mg by mouth 3 times a day.     • ascorbic acid (ASCORBIC ACID) 500 MG Tab Take 1,000 mg by mouth every day.     • BETA CAROTENE PO Take 10,000 Int'l Units by mouth every day.     • B Complex-C-Folic Acid (STRESS B COMPLEX PO) Take  by mouth 2 Times a Day.     • Non Formulary Request 2 Times a Day. Zyflamend organic anti-inflammatory     • Red Yeast Rice Extract (RED YEAST RICE PO) Take  by mouth 2 Times a Day.     • Calcium 1500 MG TABS Take  by mouth every day.     • Magnesium 500 MG CAPS Take 875 mg by mouth every evening.     • Potassium (POTASSIMIN PO) Take 45 mg by mouth every day.       No current [facility]-ordered facility-administered medications on file.        Past Medical History:   Diagnosis Date   • Arthritis     to joints   • Disorder of thyroid     hypoactive   • High cholesterol    • Hyperlipidemia    • Pain 4/22/16    low back-chronic   • Pain 1/27/17    right knee       Past Surgical History:   Procedure Laterality Date   • KNEE ARTHROSCOPY Right 1/30/2017    Procedure: KNEE ARTHROSCOPY;  Surgeon: Will Mattson M.D.;  Location: [facility];  Service:    • MEDIAL MENISCECTOMY  1/30/2017    Procedure: MEDIAL MENISCECTOMY - PARTIAL;  Surgeon: Will Mattson M.D.;  Location: [facility];   "Service:    • TRIGGER FINGER RELEASE Left 4/27/2016    Procedure: TRIGGER FINGER RELEASE - THUMB;  Surgeon: Bhavin Barker M.D.;  Location: SURGERY [facility];  Service:    • DEQUERVAINS RELEASE Right 3/2015    wrist   • COLONOSCOPY  2009   • SHOULDER SURGERY Left 2008    removal of bone spur left shoulder       Social History   Substance Use Topics   • Smoking status: Never Smoker   • Smokeless tobacco: Never Used   • Alcohol use No       Social History     Social History Narrative   • No narrative on file       Family History   Problem Relation Age of Onset   • Stroke Father        ROS:   Constitutional:  Negative for fever, chills, unexpected weight change, night sweats, body aches, sleep issues,  and fatigue/generalized weakness.   HEENT: Positive for small lump on right side head tender to touch.  Negative for headaches, vision changes, hearing changes, ear pain, tinnitus, ear discharge, rhinorrhea, sinus congestion, sneezing, sore throat, and neck pain.    Musculoskeletal: Positive for mid and low back pain.   Skin:  Negative for rash, sores, lumps, itching, cyanotic skin color change.   Neurological:  Negative for dizziness, tingling, tremors, focal sensory deficit, focal weakness and headaches.   Endo/Heme/Allergies:  Does not bruise/bleed easily. Denies cold/heat intolerance.   Psychiatric/Behavioral: Negative for depression, suicidal/homicidal ideation and memory loss.        Exam: Blood pressure 116/78, pulse 74, temperature 36.6 °C (97.8 °F), temperature source Temporal, resp. rate 16, height 1.626 m (5' 4\"), weight 94.3 kg (208 lb), SpO2 99 %. Body mass index is 35.7 kg/m².    General:  Normal appearing. No distress.  HEENT: Right side head lump palpated, tender to touch.   Neurologic:  Grossly nonfocal.  Skin: Patient brought photo of right buttock when bruising was present, on assessment bruising has resolved on right buttock, no redness or swelling however tender to touch. "   Musculoskeletal:  Normal gait. No extremity cyanosis, clubbing, or edema.  Psych:  Normal mood and affect. Alert and oriented x3. Judgment and insight is normal.    Assessment/Plan  1. Encounter for post fall examination   patient declines any orders for diagnostics including x-rays of her back or CT scans of her head as her symptoms have improved.  Patient also declines a referral to physical therapy at this time for the same reason.      Patient will continue using previously prescribed muscle relaxers as needed and following up with chiropractor.    Reviewed with patient signs and symptoms of neurological changes, patient states that she will observe for the signs as well as have her  observe for the signs, and if anything changes or is concerning she will be seen in urgent care or emergency room immediately.    Patient will follow up with PCP if symptoms fail to improve or urgent care/ER if symptoms worsen or new neurological changes.     Educated in proper administration of medication(s) ordered today including safety, possible SE, risks, benefits, rationale and alternatives to therapy.   Supportive care, differential diagnoses, and indications for immediate follow-up discussed with patient.    Pathogenesis of diagnosis discussed including typical length and natural progression.    Instructed to return to clinic or nearest emergency department for any change in condition, further concerns, or worsening of symptoms.  Patient states understanding of the plan of care and discharge instructions.    Return in about 1 month (around 2/14/2019), or if symptoms worsen or fail to improve.    Please note that this dictation was created using voice recognition software. I have made every reasonable attempt to correct obvious errors, but I expect that there are errors of grammar and possibly content that I did not discover before finalizing the note.

## 2019-04-09 ENCOUNTER — OFFICE VISIT (OUTPATIENT)
Dept: MEDICAL GROUP | Facility: PHYSICIAN GROUP | Age: 69
End: 2019-04-09
Payer: MEDICARE

## 2019-04-09 VITALS
OXYGEN SATURATION: 94 % | DIASTOLIC BLOOD PRESSURE: 70 MMHG | RESPIRATION RATE: 16 BRPM | SYSTOLIC BLOOD PRESSURE: 122 MMHG | BODY MASS INDEX: 36.02 KG/M2 | WEIGHT: 211 LBS | HEART RATE: 78 BPM | HEIGHT: 64 IN | TEMPERATURE: 97.7 F

## 2019-04-09 DIAGNOSIS — M85.80 OSTEOPENIA, UNSPECIFIED LOCATION: ICD-10-CM

## 2019-04-09 DIAGNOSIS — R93.7 ABNORMAL MRI, LUMBAR SPINE: ICD-10-CM

## 2019-04-09 DIAGNOSIS — M47.816 OSTEOARTHRITIS OF LUMBAR SPINE, UNSPECIFIED SPINAL OSTEOARTHRITIS COMPLICATION STATUS: ICD-10-CM

## 2019-04-09 DIAGNOSIS — Z83.3 FAMILY HISTORY OF DIABETES MELLITUS: ICD-10-CM

## 2019-04-09 DIAGNOSIS — E78.2 MIXED HYPERLIPIDEMIA: ICD-10-CM

## 2019-04-09 PROCEDURE — 99214 OFFICE O/P EST MOD 30 MIN: CPT | Performed by: FAMILY MEDICINE

## 2019-04-09 RX ORDER — ALENDRONATE SODIUM 70 MG/1
TABLET ORAL
Qty: 12 TAB | Refills: 3 | Status: SHIPPED | OUTPATIENT
Start: 2019-04-09 | End: 2020-01-20

## 2019-04-09 RX ORDER — PRAVASTATIN SODIUM 40 MG
TABLET ORAL
Qty: 90 TAB | Refills: 3 | Status: SHIPPED | OUTPATIENT
Start: 2019-04-09 | End: 2020-03-03 | Stop reason: SDUPTHER

## 2019-04-09 RX ORDER — CETIRIZINE HYDROCHLORIDE 10 MG/1
10 TABLET ORAL DAILY
COMMUNITY
End: 2020-12-02

## 2019-04-10 NOTE — PROGRESS NOTES
Patient comes in to recheck her cholesterol.  Her  has had a lot of complicated medical things going on he developed endocarditis and a prosthetic heart valve and he also developed osteomyelitis in his spine.  He is on antibiotics for this parenterally and is seeing infectious disease on a regular basis.  Because of all the stress the patient began to eat more and has gained weight.  She was not taking the pravastatin at 40 mg a day which I advised her to do.  She is due for recheck of her labs.  She would like to know what her bone density is doing of which has been on Fosamax.  She is tolerating Fosamax without problems.    I reviewed the following    Past Medical History:   Diagnosis Date   • Arthritis     to joints   • Disorder of thyroid     hypoactive   • High cholesterol    • Hyperlipidemia    • Pain 4/22/16    low back-chronic   • Pain 1/27/17    right knee        Past Surgical History:   Procedure Laterality Date   • KNEE ARTHROSCOPY Right 1/30/2017    Procedure: KNEE ARTHROSCOPY;  Surgeon: Will Mattson M.D.;  Location: [facility];  Service:    • MEDIAL MENISCECTOMY  1/30/2017    Procedure: MEDIAL MENISCECTOMY - PARTIAL;  Surgeon: Will Mattson M.D.;  Location: SURGERY [facility];  Service:    • TRIGGER FINGER RELEASE Left 4/27/2016    Procedure: TRIGGER FINGER RELEASE - THUMB;  Surgeon: Bhavin Barker M.D.;  Location: [facility];  Service:    • DEQUERVAINS RELEASE Right 3/2015    wrist   • COLONOSCOPY  2009   • SHOULDER SURGERY Left 2008    removal of bone spur left shoulder       Allergies   Allergen Reactions   • Meloxicam      nausea       Current Outpatient Prescriptions   Medication Sig Dispense Refill   • cetirizine (ZYRTEC) 10 MG Tab Take 10 mg by mouth every day.     • aspirin EC (ECOTRIN) 81 MG Tablet Delayed Response Take 81 mg by mouth every day.     • pravastatin (PRAVACHOL) 40 MG tablet TAKE ONE TABLET BY MOUTH ONCE DAILY AT BEDTIME 90  Tab 3   • alendronate (FOSAMAX) 70 MG Tab TAKE 1 TABLET BY MOUTH ONCE A WEEK ON AN EMPTY STOMACH WITH A FULL GLASS OF WATER. **REMAIN UPRIGHT ** 12 Tab 3   • piroxicam (FELDENE) 10 MG Cap Take 1 Cap by mouth every day. 90 Cap 3   • tizanidine (ZANAFLEX) 4 MG Tab Take 1 Tab by mouth every 6 hours as needed. For muscle spasm 30 Tab 3   • levothyroxine (SYNTHROID) 50 MCG Tab TAKE ONE TABLET BY MOUTH ONCE DAILY IN THE MORNING ON AN EMPTY STOMACH 90 Tab 3   • vitamin D3, cholecalciferol, 1000 UNIT Tab Take 1 Tab by mouth every day. 100 Tab 3   • hydrocodone/acetaminophen (NORCO)  MG Tab Take 0.5-1 Tabs by mouth every four hours as needed. 60 Tab 0   • ascorbic acid (ASCORBIC ACID) 500 MG Tab Take 1,000 mg by mouth every day.     • B Complex-C-Folic Acid (STRESS B COMPLEX PO) Take  by mouth 2 Times a Day.     • Non Formulary Request 2 Times a Day. Zyflamend organic anti-inflammatory     • Red Yeast Rice Extract (RED YEAST RICE PO) Take  by mouth 2 Times a Day.     • Calcium 1500 MG TABS Take  by mouth every day.     • Magnesium 500 MG CAPS Take 875 mg by mouth every evening.     • Potassium (POTASSIMIN PO) Take 45 mg by mouth every day.     • triamcinolone (KENALOG) 0.1 % lotion Apply to scalp after shampooing with Selsun Blue shampoo (Patient not taking: Reported on 4/9/2019) 60 mL 11   • predniSONE (DELTASONE) 10 MG Tab Take with food. Take 6 pills on day 1, then 5 pills on days 2 and 3, then 4 pills on days 4 and 5, then 3 pills on days 6 and 7, then 2 pills on days 8 and 9, then 1 pill on day 10, then stop. (Patient not taking: Reported on 4/9/2019) 35 Tab 0   • clotrimazole-betamethasone (LOTRISONE) 1-0.05 % Cream Apply 1 Squirt to affected area(s) 2 times a day. (Patient not taking: Reported on 4/9/2019) 15 g 2   • Salicylic Acid (SELSUN BLUE DEEP CLEANSING) 3 % Shampoo Shampoo hair with this every other day (Patient not taking: Reported on 4/9/2019) 1 Bottle 3   • cyclobenzaprine (FLEXERIL) 10 MG Tab Take 1  Tab by mouth 3 times a day as needed. (Patient not taking: Reported on 4/9/2019) 30 Tab 3   • acetaminophen (TYLENOL) 500 MG Tab Take 500-1,000 mg by mouth 3 times a day.     • BETA CAROTENE PO Take 10,000 Int'l Units by mouth every day.       No current facility-administered medications for this visit.         Family History   Problem Relation Age of Onset   • Stroke Father        Social History     Social History   • Marital status:      Spouse name: N/A   • Number of children: N/A   • Years of education: N/A     Occupational History   • Not on file.     Social History Main Topics   • Smoking status: Never Smoker   • Smokeless tobacco: Never Used   • Alcohol use No   • Drug use: No   • Sexual activity: Yes     Partners: Male     Birth control/ protection: Post-Menopausal     Other Topics Concern   • Not on file     Social History Narrative   • No narrative on file      Physical Exam   Constitutional: She is oriented. She appears well-developed and well-nourished. No distress.   HENT:  Head: Normocephalic and atraumatic.   Right Ear: External ear normal. Ear canal and TM normal   Left Ear: External ear normal. Ear canal and TM normal  Nose: Nose normal.   Mouth/Throat: Oropharynx is clear and moist.   Eyes: Conjunctivae and extraocular motions are normal. Pupils are equal, round, and reactive to light. Fundi benign bilaterally   Neck: No thyromegaly present.   Cardiovascular: Normal rate, regular rhythm, normal heart sounds and intact distal pulses.  Exam reveals no gallop.    No murmur heard.  Pulmonary/Chest: Effort normal and breath sounds normal. No respiratory distress. She has no wheezes. She has no rales.   Abdominal: Soft. Bowel sounds are normal. No hepatosplenomegaly She exhibits no distension. No tenderness. She has no rebound and no guarding.   Musculoskeletal: Normal range of motion. She exhibits no edema and no tenderness.   Lymphadenopathy:     She has no cervical adenopathy.   No  supraclavicular adenopathy  Neurological: She is alert and oriented. She has normal reflexes.        Babinskis downgoing bilaterally   Skin: Skin is warm and dry. No rash noted. No erythema.   Psychiatric: She has a normal mood and appropriate affect. Her behavior is normal. Judgment and thought content normal.     1. Mixed hyperlipidemia  pravastatin (PRAVACHOL) 40 MG tablet--she is going to start taking this 1 nightly    Comp Metabolic Panel    Lipid Profile  These labs will be done in 2 months   2. Osteoarthritis of lumbar spine, unspecified spinal osteoarthritis complication status   stable   3. Abnormal MRI, lumbar spine   stable   4. Family history of diabetes mellitus     5. Osteopenia, unspecified location  alendronate (FOSAMAX) 70 MG Tab    DS-BONE DENSITY STUDY (DEXA)     Please note that this dictation was created using voice recognition software. I have worked with consultants from the vendor as well as technical experts from [employer] [facility] [employer] to optimize the interface. I have made every reasonable attempt to correct obvious errors, but I expect that there are errors of grammar and possibly content that I did not discover before finalizing the note.    Recheck with me 6 months or as needed

## 2019-04-16 ENCOUNTER — HOSPITAL ENCOUNTER (OUTPATIENT)
Dept: RADIOLOGY | Facility: MEDICAL CENTER | Age: 69
End: 2019-04-16
Attending: NURSE PRACTITIONER
Payer: MEDICARE

## 2019-04-16 DIAGNOSIS — M79.671 RIGHT FOOT PAIN: ICD-10-CM

## 2019-04-16 PROCEDURE — 20605 DRAIN/INJ JOINT/BURSA W/O US: CPT | Mod: RT

## 2019-04-16 PROCEDURE — 700117 HCHG RX CONTRAST REV CODE 255: Performed by: NURSE PRACTITIONER

## 2019-04-16 PROCEDURE — 700111 HCHG RX REV CODE 636 W/ 250 OVERRIDE (IP): Mod: JG | Performed by: NURSE PRACTITIONER

## 2019-04-16 PROCEDURE — 700101 HCHG RX REV CODE 250: Performed by: NURSE PRACTITIONER

## 2019-04-16 PROCEDURE — 77002 NEEDLE LOCALIZATION BY XRAY: CPT

## 2019-04-16 RX ORDER — TRIAMCINOLONE ACETONIDE 40 MG/ML
40 INJECTION, SUSPENSION INTRA-ARTICULAR; INTRAMUSCULAR ONCE
Status: COMPLETED | OUTPATIENT
Start: 2019-04-16 | End: 2019-04-16

## 2019-04-16 RX ORDER — BUPIVACAINE HYDROCHLORIDE 5 MG/ML
10 INJECTION, SOLUTION EPIDURAL; INTRACAUDAL ONCE
Status: COMPLETED | OUTPATIENT
Start: 2019-04-16 | End: 2019-04-16

## 2019-04-16 RX ADMIN — TRIAMCINOLONE ACETONIDE 40 MG: 40 INJECTION, SUSPENSION INTRA-ARTICULAR; INTRAMUSCULAR at 15:00

## 2019-04-16 RX ADMIN — IOHEXOL 1 ML: 300 INJECTION, SOLUTION INTRAVENOUS at 15:07

## 2019-04-16 RX ADMIN — BUPIVACAINE HYDROCHLORIDE 1 ML: 5 INJECTION, SOLUTION EPIDURAL; INTRACAUDAL; PERINEURAL at 15:00

## 2019-04-16 RX ADMIN — LIDOCAINE HYDROCHLORIDE 1 ML: 10 INJECTION, SOLUTION INFILTRATION; PERINEURAL at 15:00

## 2019-05-29 DIAGNOSIS — E03.8 OTHER SPECIFIED HYPOTHYROIDISM: ICD-10-CM

## 2019-05-30 NOTE — TELEPHONE ENCOUNTER
Was the patient seen in the last year in this department? Yes LOV 04/09/19    Does patient have an active prescription for medications requested? No     Received Request Via: Pharmacy

## 2019-05-31 RX ORDER — LEVOTHYROXINE SODIUM 0.05 MG/1
TABLET ORAL
Qty: 90 TAB | Refills: 3 | Status: SHIPPED | OUTPATIENT
Start: 2019-05-31 | End: 2020-06-09

## 2019-06-12 ENCOUNTER — HOSPITAL ENCOUNTER (OUTPATIENT)
Dept: LAB | Facility: MEDICAL CENTER | Age: 69
End: 2019-06-12
Attending: FAMILY MEDICINE
Payer: MEDICARE

## 2019-06-12 DIAGNOSIS — E78.2 MIXED HYPERLIPIDEMIA: ICD-10-CM

## 2019-06-12 LAB
ALBUMIN SERPL BCP-MCNC: 4 G/DL (ref 3.2–4.9)
ALBUMIN/GLOB SERPL: 1.4 G/DL
ALP SERPL-CCNC: 62 U/L (ref 30–99)
ALT SERPL-CCNC: 30 U/L (ref 2–50)
ANION GAP SERPL CALC-SCNC: 5 MMOL/L (ref 0–11.9)
AST SERPL-CCNC: 27 U/L (ref 12–45)
BILIRUB SERPL-MCNC: 0.7 MG/DL (ref 0.1–1.5)
BUN SERPL-MCNC: 17 MG/DL (ref 8–22)
CALCIUM SERPL-MCNC: 9.7 MG/DL (ref 8.5–10.5)
CHLORIDE SERPL-SCNC: 106 MMOL/L (ref 96–112)
CHOLEST SERPL-MCNC: 196 MG/DL (ref 100–199)
CO2 SERPL-SCNC: 28 MMOL/L (ref 20–33)
CREAT SERPL-MCNC: 1.12 MG/DL (ref 0.5–1.4)
FASTING STATUS PATIENT QL REPORTED: NORMAL
GLOBULIN SER CALC-MCNC: 2.9 G/DL (ref 1.9–3.5)
GLUCOSE SERPL-MCNC: 101 MG/DL (ref 65–99)
HDLC SERPL-MCNC: 60 MG/DL
LDLC SERPL CALC-MCNC: 104 MG/DL
POTASSIUM SERPL-SCNC: 4.5 MMOL/L (ref 3.6–5.5)
PROT SERPL-MCNC: 6.9 G/DL (ref 6–8.2)
SODIUM SERPL-SCNC: 139 MMOL/L (ref 135–145)
TRIGL SERPL-MCNC: 159 MG/DL (ref 0–149)

## 2019-06-12 PROCEDURE — 36415 COLL VENOUS BLD VENIPUNCTURE: CPT

## 2019-06-12 PROCEDURE — 80053 COMPREHEN METABOLIC PANEL: CPT

## 2019-06-12 PROCEDURE — 80061 LIPID PANEL: CPT

## 2019-07-08 ENCOUNTER — OFFICE VISIT (OUTPATIENT)
Dept: MEDICAL GROUP | Facility: PHYSICIAN GROUP | Age: 69
End: 2019-07-08
Payer: MEDICARE

## 2019-07-08 VITALS
SYSTOLIC BLOOD PRESSURE: 126 MMHG | HEART RATE: 60 BPM | DIASTOLIC BLOOD PRESSURE: 68 MMHG | WEIGHT: 214 LBS | OXYGEN SATURATION: 98 % | BODY MASS INDEX: 36.54 KG/M2 | TEMPERATURE: 97.5 F | HEIGHT: 64 IN | RESPIRATION RATE: 12 BRPM

## 2019-07-08 DIAGNOSIS — M54.42 CHRONIC LEFT-SIDED LOW BACK PAIN WITH LEFT-SIDED SCIATICA: ICD-10-CM

## 2019-07-08 DIAGNOSIS — G89.29 CHRONIC LEFT-SIDED LOW BACK PAIN WITH LEFT-SIDED SCIATICA: ICD-10-CM

## 2019-07-08 PROCEDURE — 99214 OFFICE O/P EST MOD 30 MIN: CPT | Performed by: FAMILY MEDICINE

## 2019-07-08 RX ORDER — METHYLPREDNISOLONE 4 MG/1
4 TABLET ORAL DAILY
Qty: 1 KIT | Refills: 0 | Status: SHIPPED | OUTPATIENT
Start: 2019-07-08 | End: 2020-06-23

## 2019-07-08 ASSESSMENT — PAIN SCALES - GENERAL: PAINLEVEL: 10=SEVERE PAIN

## 2019-07-08 NOTE — ASSESSMENT & PLAN NOTE
This is a chronic medical problem for the last 25 years.   However over the last 4 to 5 days she has noticed increased left lower back spasm and left sciatica.  She immediately starts having pain with standing up and improvement in a supine position.  Denies any bowel or bladder dysfunction.  She has started taking Zanaflex and old prescription of Norcos since last Friday.   Has noticed some improvement but not complete resolution.   She had an MRI of the lumbar spine done back in August 2018 which did show bulging disc and at that time she was referred to neurosurgery.  However she was feeling much improved she canceled the appointment.  She continues to see a chiropractor and had an appointment today which she has rescheduled.

## 2019-07-08 NOTE — PROGRESS NOTES
CC:The encounter diagnosis was Chronic left-sided low back pain with left-sided sciatica.    HISTORY OF PRESENT ILLNESS: Patient is a 69 y.o. female established patient who presents today for further evaluation of her lower back pain.    Chronic left-sided low back pain with left-sided sciatica  This is a chronic medical problem for the last 25 years.   However over the last 4 to 5 days she has noticed increased left lower back spasm and left sciatica.  She immediately starts having pain with standing up and improvement in a supine position.  Denies any bowel or bladder dysfunction.  She has started taking Zanaflex and old prescription of Norcos since last Friday.   Has noticed some improvement but not complete resolution.   She had an MRI of the lumbar spine done back in August 2018 which did show bulging disc and at that time she was referred to neurosurgery.  However she was feeling much improved she canceled the appointment.  She continues to see a chiropractor and had an appointment today which she has rescheduled.       Patient Active Problem List    Diagnosis Date Noted   • Chronic left-sided low back pain with left-sided sciatica 07/08/2019   • Encounter for post fall examination 01/14/2019   • Seborrheic dermatitis of scalp 04/04/2018   • Vitamin D deficiency 03/31/2017   • Obesity (BMI 30-39.9) 03/28/2017   • Derangement of medial meniscus of right knee due to old injury 01/30/2017   • Left hand pain 04/27/2016   • Degenerative arthritis of lumbar spine 02/17/2015   • Abnormal MRI, lumbar spine 02/17/2015   • Osteopenia 02/17/2015   • Hyperlipidemia 02/17/2015   • Family history of diabetes mellitus 02/17/2015      Allergies:Meloxicam    Current Outpatient Prescriptions   Medication Sig Dispense Refill   • methylPREDNISolone (MEDROL DOSEPAK) 4 MG Tablet Therapy Pack Take 1 Tab by mouth every day. 1 Kit 0   • levothyroxine (SYNTHROID) 50 MCG Tab TAKE 1 TABLET BY MOUTH ONCE DAILY IN THE MORNING ON AN EMPTY  STOMACH 90 Tab 3   • aspirin EC (ECOTRIN) 81 MG Tablet Delayed Response Take 81 mg by mouth every day.     • pravastatin (PRAVACHOL) 40 MG tablet TAKE ONE TABLET BY MOUTH ONCE DAILY AT BEDTIME 90 Tab 3   • alendronate (FOSAMAX) 70 MG Tab TAKE 1 TABLET BY MOUTH ONCE A WEEK ON AN EMPTY STOMACH WITH A FULL GLASS OF WATER. **REMAIN UPRIGHT ** 12 Tab 3   • piroxicam (FELDENE) 10 MG Cap Take 1 Cap by mouth every day. 90 Cap 3   • triamcinolone (KENALOG) 0.1 % lotion Apply to scalp after shampooing with Selsun Blue shampoo 60 mL 11   • tizanidine (ZANAFLEX) 4 MG Tab Take 1 Tab by mouth every 6 hours as needed. For muscle spasm 30 Tab 3   • vitamin D3, cholecalciferol, 1000 UNIT Tab Take 1 Tab by mouth every day. 100 Tab 3   • hydrocodone/acetaminophen (NORCO)  MG Tab Take 0.5-1 Tabs by mouth every four hours as needed. 60 Tab 0   • ascorbic acid (ASCORBIC ACID) 500 MG Tab Take 1,000 mg by mouth every day.     • BETA CAROTENE PO Take 10,000 Int'l Units by mouth every day.     • B Complex-C-Folic Acid (STRESS B COMPLEX PO) Take  by mouth 2 Times a Day.     • Non Formulary Request 2 Times a Day. Zyflamend organic anti-inflammatory     • Red Yeast Rice Extract (RED YEAST RICE PO) Take  by mouth 2 Times a Day.     • Calcium 1500 MG TABS Take  by mouth every day.     • Magnesium 500 MG CAPS Take 875 mg by mouth every evening.     • Potassium (POTASSIMIN PO) Take 45 mg by mouth every day.     • cetirizine (ZYRTEC) 10 MG Tab Take 10 mg by mouth every day.     • clotrimazole-betamethasone (LOTRISONE) 1-0.05 % Cream Apply 1 Squirt to affected area(s) 2 times a day. (Patient not taking: Reported on 4/9/2019) 15 g 2   • Salicylic Acid (SELSUN BLUE DEEP CLEANSING) 3 % Shampoo Shampoo hair with this every other day (Patient not taking: Reported on 4/9/2019) 1 Bottle 3   • acetaminophen (TYLENOL) 500 MG Tab Take 500-1,000 mg by mouth 3 times a day.       No current facility-administered medications for this visit.        Social  "History   Substance Use Topics   • Smoking status: Never Smoker   • Smokeless tobacco: Never Used   • Alcohol use No     Social History     Social History Narrative   • No narrative on file       Family History   Problem Relation Age of Onset   • Stroke Father        Review of Systems:      - Constitutional: Negative for fever, chills, fatigue    - HEENT: Negative for sinus congestion or sore throat    - Respiratory: Negative for cough or SOB    - Cardiovascular: Negative for chest pain, palpitations, LE edema    - Gastrointestinal: Negative for heartburn, nausea, vomiting, abdominal pain,     - Genitourinary: Negative for dysuria,    - Musculoskeletal: left lower back pain radiating to left thigh     - Skin: Negative for rash, itching,    - Neurological: Negative for dizziness, tremors, headaches    - Endo/Heme/Allergies: Does not bruise/bleed easily.     - Psychiatric/Behavioral: Negative for depression or anxiety    Exam:    Vitals: /68 (BP Location: Right arm, Patient Position: Sitting, BP Cuff Size: Adult)   Pulse 60   Temp 36.4 °C (97.5 °F) (Temporal)   Resp 12   Ht 1.626 m (5' 4\")   Wt 97.1 kg (214 lb)   SpO2 98%   BMI 36.73 kg/m²   General:  Alert, pleasant, NAD  Eyes:  normal inspection of conjunctivae and lids, EOMI,   ENMT:  External ears and nose are normal.   Neck  supple, .  Abdomen:   soft, Non-distended,   Skin:  Warm, dry, no rashes,   Musculoskeletal:  Left lower back TTP, Normal gait, Normal digits and nails.   Neurological: No tremors,   Psych:   Affect/mood is normal, judgement is good, memory is intact, grooming is appropriate.    Assessment/Plan:  1. Chronic left-sided low back pain with left-sided sciatica  Acute exacerbation of a chronic medical problem. Precautions discussed with patient.  Follow up with PCP 1-2 weeks.   - methylPREDNISolone (MEDROL DOSEPAK) 4 MG Tablet Therapy Pack; Take 1 Tab by mouth every day.  Dispense: 1 Kit; Refill: 0  - REFERRAL TO " NEUROSURGERY--requesting Dr Pleitez  - REFERRAL TO PHYSICAL THERAPY Reason for Therapy: Eval/Treat/Report        No Follow-up on file.      This note was created using voice recognition software (Dragon). The accuracy of the dictation is limited by the abilities of the software. I have reviewed the note prior to signing, however some errors in grammar and context are still possible. If you have any questions related to this note please do not hesitate to contact our office.

## 2019-07-12 ENCOUNTER — TELEPHONE (OUTPATIENT)
Dept: MEDICAL GROUP | Facility: PHYSICIAN GROUP | Age: 69
End: 2019-07-12

## 2019-07-12 NOTE — TELEPHONE ENCOUNTER
Future Appointments       Provider Department Center    7/17/2019 10:20 AM Sen Garcia M.D. [facility] [place]        ESTABLISHED PATIENT PRE-VISIT PLANNING     Patient was NOT contacted to complete PVP.      1.  Reviewed notes from the last few office visits within the medical group: Yes    2.  If any orders were placed at last visit or intended to be done for this visit (i.e. 6 mos follow-up), do we have Results/Consult Notes?        •  Labs - Labs were not ordered at last office visit.       •  Imaging - Imaging was not ordered at last office visit.       •  Referrals - Referral ordered, patient has NOT been seen.    3. Is this appointment scheduled as a Hospital Follow-Up? No    4.  Immunizations were updated in [employer] using WebIZ?: Yes       •  Web Iz Recommendations: FLU, HEPATITIS A , MMR , TD and SHINGRIX (Shingles)    5.  Patient is due for the following Health Maintenance Topics:   Health Maintenance Due   Topic Date Due   • Annual Wellness Visit  1950   • HEPATITIS C SCREENING  1950   • IMM ZOSTER VACCINES (2 of 3) 05/22/2012   • MAMMOGRAM  08/19/2016       6. Orders for overdue Health Maintenance topics pended in Pre-Charting? N\A    7.  AHA (MDX) form printed for Provider? No, already completed    8.  Patient was NOT informed to arrive 15 min prior to their scheduled appointment and bring in their medication bottles.

## 2019-07-17 ENCOUNTER — OFFICE VISIT (OUTPATIENT)
Dept: MEDICAL GROUP | Facility: PHYSICIAN GROUP | Age: 69
End: 2019-07-17
Payer: MEDICARE

## 2019-07-17 VITALS
OXYGEN SATURATION: 98 % | TEMPERATURE: 97 F | HEIGHT: 64 IN | HEART RATE: 85 BPM | RESPIRATION RATE: 20 BRPM | BODY MASS INDEX: 35.51 KG/M2 | DIASTOLIC BLOOD PRESSURE: 72 MMHG | SYSTOLIC BLOOD PRESSURE: 114 MMHG | WEIGHT: 208 LBS

## 2019-07-17 DIAGNOSIS — R93.7 ABNORMAL MRI, LUMBAR SPINE: ICD-10-CM

## 2019-07-17 DIAGNOSIS — M47.816 OSTEOARTHRITIS OF LUMBAR SPINE, UNSPECIFIED SPINAL OSTEOARTHRITIS COMPLICATION STATUS: ICD-10-CM

## 2019-07-17 DIAGNOSIS — E78.2 MIXED HYPERLIPIDEMIA: ICD-10-CM

## 2019-07-17 PROCEDURE — 99213 OFFICE O/P EST LOW 20 MIN: CPT | Performed by: FAMILY MEDICINE

## 2019-07-17 RX ORDER — PREDNISONE 10 MG/1
TABLET ORAL
Qty: 35 TAB | Refills: 0 | Status: SHIPPED | OUTPATIENT
Start: 2019-07-17 | End: 2019-07-23 | Stop reason: SDUPTHER

## 2019-07-17 RX ORDER — TIZANIDINE 4 MG/1
4 TABLET ORAL EVERY 6 HOURS PRN
Qty: 30 TAB | Refills: 3 | Status: SHIPPED | OUTPATIENT
Start: 2019-07-17 | End: 2020-08-03 | Stop reason: SDUPTHER

## 2019-07-17 NOTE — PROGRESS NOTES
Patient comes in to follow-up on her low back pain with radiation into her left leg.  She has an appointment to see Dr. Pleitez the neurosurgeon in 2 weeks.  MRI scanning in August 2018 has showed a disc protrusion at L5-S1 but also numerous bulging disks and some degenerative changes which are impinging on neural foramina.  The patient been given a Medrol Dosepak and tizanidine.  Tizanidine helps with muscle spasms but the Medrol Dosepak did not last long enough she feels.      I reviewed the following    Past Medical History:   Diagnosis Date   • Arthritis     to joints   • Disorder of thyroid     hypoactive   • High cholesterol    • Hyperlipidemia    • Pain 4/22/16    low back-chronic   • Pain 1/27/17    right knee        Past Surgical History:   Procedure Laterality Date   • KNEE ARTHROSCOPY Right 1/30/2017    Procedure: KNEE ARTHROSCOPY;  Surgeon: Will Mattson M.D.;  Location: [facility];  Service:    • MEDIAL MENISCECTOMY  1/30/2017    Procedure: MEDIAL MENISCECTOMY - PARTIAL;  Surgeon: Will Mattson M.D.;  Location: SURGERY [facility];  Service:    • TRIGGER FINGER RELEASE Left 4/27/2016    Procedure: TRIGGER FINGER RELEASE - THUMB;  Surgeon: Bhavin Barker M.D.;  Location: [facility];  Service:    • DEQUERVAINS RELEASE Right 3/2015    wrist   • COLONOSCOPY  2009   • SHOULDER SURGERY Left 2008    removal of bone spur left shoulder       Allergies   Allergen Reactions   • Meloxicam      nausea       Current Outpatient Prescriptions   Medication Sig Dispense Refill   • predniSONE (DELTASONE) 10 MG Tab Take with food. Take 6 pills on day 1, then 5 pills on days 2 and 3, then 4 pills on days 4 and 5, then 3 pills on days 6 and 7, then 2 pills on days 8 and 9, then 1 pill on day 10, then stop. 35 Tab 0   • tizanidine (ZANAFLEX) 4 MG Tab Take 1 Tab by mouth every 6 hours as needed. For muscle spasm 30 Tab 3   • levothyroxine (SYNTHROID) 50 MCG Tab TAKE 1 TABLET BY  MOUTH ONCE DAILY IN THE MORNING ON AN EMPTY STOMACH 90 Tab 3   • cetirizine (ZYRTEC) 10 MG Tab Take 10 mg by mouth every day.     • aspirin EC (ECOTRIN) 81 MG Tablet Delayed Response Take 81 mg by mouth every day.     • pravastatin (PRAVACHOL) 40 MG tablet TAKE ONE TABLET BY MOUTH ONCE DAILY AT BEDTIME 90 Tab 3   • alendronate (FOSAMAX) 70 MG Tab TAKE 1 TABLET BY MOUTH ONCE A WEEK ON AN EMPTY STOMACH WITH A FULL GLASS OF WATER. **REMAIN UPRIGHT ** 12 Tab 3   • piroxicam (FELDENE) 10 MG Cap Take 1 Cap by mouth every day. 90 Cap 3   • triamcinolone (KENALOG) 0.1 % lotion Apply to scalp after shampooing with Selsun Blue shampoo 60 mL 11   • vitamin D3, cholecalciferol, 1000 UNIT Tab Take 1 Tab by mouth every day. 100 Tab 3   • hydrocodone/acetaminophen (NORCO)  MG Tab Take 0.5-1 Tabs by mouth every four hours as needed. 60 Tab 0   • acetaminophen (TYLENOL) 500 MG Tab Take 500-1,000 mg by mouth 3 times a day.     • ascorbic acid (ASCORBIC ACID) 500 MG Tab Take 1,000 mg by mouth every day.     • BETA CAROTENE PO Take 10,000 Int'l Units by mouth every day.     • B Complex-C-Folic Acid (STRESS B COMPLEX PO) Take  by mouth 2 Times a Day.     • Non Formulary Request 2 Times a Day. Zyflamend organic anti-inflammatory     • Red Yeast Rice Extract (RED YEAST RICE PO) Take  by mouth 2 Times a Day.     • Calcium 1500 MG TABS Take  by mouth every day.     • Magnesium 500 MG CAPS Take 875 mg by mouth every evening.     • Potassium (POTASSIMIN PO) Take 45 mg by mouth every day.     • methylPREDNISolone (MEDROL DOSEPAK) 4 MG Tablet Therapy Pack Take 1 Tab by mouth every day. (Patient not taking: Reported on 7/17/2019) 1 Kit 0   • clotrimazole-betamethasone (LOTRISONE) 1-0.05 % Cream Apply 1 Squirt to affected area(s) 2 times a day. 15 g 2   • Salicylic Acid (SELSUN BLUE DEEP CLEANSING) 3 % Shampoo Shampoo hair with this every other day (Patient not taking: Reported on 4/9/2019) 1 Bottle 3     No current facility-administered  medications for this visit.         Family History   Problem Relation Age of Onset   • Stroke Father        Social History     Social History   • Marital status:      Spouse name: N/A   • Number of children: N/A   • Years of education: N/A     Occupational History   • Not on file.     Social History Main Topics   • Smoking status: Never Smoker   • Smokeless tobacco: Never Used   • Alcohol use No   • Drug use: No   • Sexual activity: Yes     Partners: Male     Birth control/ protection: Post-Menopausal     Other Topics Concern   • Not on file     Social History Narrative   • No narrative on file      The patient is well-developed well-nourished and in no acute distress    Pupils equally round and reactive to light    Ears normal    Nose normal    Throat clear    Neck supple no cervical adenopathy no thyromegaly    Chest clear to auscultation    Heart regular rhythm no murmur S3 or S4 appreciated    Back she is 2+ tender over the L4-5 area on the left.  She has negative straight leg raising bilaterally.      Abdomen flat soft good bowel sounds no mass No hepatosplenomegaly no rebound    Skin no rashes or suspicious lesions    DTRs 2+ in ankles knees and biceps    Pulses 2+ in all 4 extremities    1. Abnormal MRI, lumbar spine  predniSONE (DELTASONE) 10 MG Tab--tapering her from 60 mg down to 0/10 day course.  She will take this with food    tizanidine (ZANAFLEX) 4 MG Tab--refill  Patient will follow-up as scheduled with Dr. Pleitez   2. Osteoarthritis of lumbar spine, unspecified spinal osteoarthritis complication status   as above under #1   3. Mixed hyperlipidemia   continue pravastatin     Recheck with me as needed    Please note that this dictation was created using voice recognition software. I have worked with consultants from the vendor as well as technical experts from [employer] to optimize the interface. I have made every reasonable attempt to correct obvious errors, but I expect that there are  errors of grammar and possibly content that I did not discover before finalizing the note.

## 2019-07-23 ENCOUNTER — HOSPITAL ENCOUNTER (OUTPATIENT)
Dept: RADIOLOGY | Facility: MEDICAL CENTER | Age: 69
End: 2019-07-23
Attending: NEUROLOGICAL SURGERY
Payer: MEDICARE

## 2019-07-23 DIAGNOSIS — M54.5 LOW BACK PAIN, UNSPECIFIED BACK PAIN LATERALITY, UNSPECIFIED CHRONICITY, WITH SCIATICA PRESENCE UNSPECIFIED: ICD-10-CM

## 2019-07-23 DIAGNOSIS — R93.7 ABNORMAL MRI, LUMBAR SPINE: ICD-10-CM

## 2019-07-23 PROCEDURE — 72110 X-RAY EXAM L-2 SPINE 4/>VWS: CPT

## 2019-07-23 RX ORDER — PREDNISONE 10 MG/1
TABLET ORAL
Qty: 35 TAB | Refills: 0 | Status: SHIPPED
Start: 2019-07-23 | End: 2020-06-23

## 2019-10-07 ENCOUNTER — OFFICE VISIT (OUTPATIENT)
Dept: MEDICAL GROUP | Facility: PHYSICIAN GROUP | Age: 69
End: 2019-10-07
Payer: MEDICARE

## 2019-10-07 VITALS
SYSTOLIC BLOOD PRESSURE: 126 MMHG | HEART RATE: 89 BPM | HEIGHT: 64 IN | BODY MASS INDEX: 36.37 KG/M2 | TEMPERATURE: 97.2 F | RESPIRATION RATE: 14 BRPM | OXYGEN SATURATION: 94 % | DIASTOLIC BLOOD PRESSURE: 78 MMHG | WEIGHT: 213 LBS

## 2019-10-07 DIAGNOSIS — E78.2 MIXED HYPERLIPIDEMIA: ICD-10-CM

## 2019-10-07 DIAGNOSIS — G89.29 CHRONIC LEFT-SIDED LOW BACK PAIN WITH LEFT-SIDED SCIATICA: ICD-10-CM

## 2019-10-07 DIAGNOSIS — M85.80 OSTEOPENIA, UNSPECIFIED LOCATION: ICD-10-CM

## 2019-10-07 DIAGNOSIS — E55.9 VITAMIN D DEFICIENCY: ICD-10-CM

## 2019-10-07 DIAGNOSIS — M54.42 CHRONIC LEFT-SIDED LOW BACK PAIN WITH LEFT-SIDED SCIATICA: ICD-10-CM

## 2019-10-07 DIAGNOSIS — M47.816 OSTEOARTHRITIS OF LUMBAR SPINE, UNSPECIFIED SPINAL OSTEOARTHRITIS COMPLICATION STATUS: ICD-10-CM

## 2019-10-07 DIAGNOSIS — Z12.31 SCREENING MAMMOGRAM FOR HIGH-RISK PATIENT: ICD-10-CM

## 2019-10-07 PROCEDURE — 99214 OFFICE O/P EST MOD 30 MIN: CPT | Performed by: FAMILY MEDICINE

## 2019-10-07 RX ORDER — ETODOLAC 400 MG/1
400 TABLET, FILM COATED ORAL 2 TIMES DAILY
Qty: 30 TAB | Refills: 3 | Status: SHIPPED | OUTPATIENT
Start: 2019-10-07 | End: 2019-11-25
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
## 2019-11-01 ENCOUNTER — HOSPITAL ENCOUNTER (OUTPATIENT)
Dept: RADIOLOGY | Facility: MEDICAL CENTER | Age: 69
End: 2019-11-01
Attending: FAMILY MEDICINE
Payer: MEDICARE

## 2019-11-01 DIAGNOSIS — Z12.31 SCREENING MAMMOGRAM FOR HIGH-RISK PATIENT: ICD-10-CM

## 2019-11-01 PROCEDURE — 77063 BREAST TOMOSYNTHESIS BI: CPT

## 2019-11-25 DIAGNOSIS — M47.816 OSTEOARTHRITIS OF LUMBAR SPINE, UNSPECIFIED SPINAL OSTEOARTHRITIS COMPLICATION STATUS: ICD-10-CM

## 2019-11-25 RX ORDER — PIROXICAM 10 MG/1
10 CAPSULE ORAL 2 TIMES DAILY WITH MEALS
Qty: 180 CAP | Refills: 3 | Status: SHIPPED | OUTPATIENT
Start: 2019-11-25 | End: 2021-01-18 | Stop reason: SDUPTHER

## 2019-12-22 ENCOUNTER — HOSPITAL ENCOUNTER (OUTPATIENT)
Facility: MEDICAL CENTER | Age: 69
End: 2019-12-22
Payer: MEDICARE

## 2019-12-22 PROCEDURE — 82274 ASSAY TEST FOR BLOOD FECAL: CPT

## 2019-12-23 ENCOUNTER — PATIENT OUTREACH (OUTPATIENT)
Dept: HEALTH INFORMATION MANAGEMENT | Facility: OTHER | Age: 69
End: 2019-12-23

## 2019-12-24 NOTE — PROGRESS NOTES
I have one of my members who received a letter to complete her fit test and she receives a gift card. She completed the fit test today 12/23/19 and turned it in to the lab on [place] which she stated around 10:30am. She called because she was concerned because she stated there was a form that [employer] sent for her to fill out with her ’s information for [employer] for release of information; which was sent with the fit test on accident. I called [facility] and spoke with Renuka and she sated they picked up the specimens already and took them to the main lab. Then she transferred me to what she stated was the main lab but I was routed to scheduling department and fortunately they were able to connect me with Yvonne. Yvonne was able to help her and there was a lot of miscommunication with me and the patient the beginning of the call. Yvonne took over the call to help Deneen and I was able to call Deneen because I wanted to make sure she was able to get an answer and she stated Yvonne found the specimen but told her that it was fine and she did not need to fill out anything and that they would keep the forms that were found in the kit. I asked if the kit was sent with Med record release authorization form and waiting for an answer.

## 2019-12-30 LAB — HEMOCCULT STL QL IA: NEGATIVE

## 2020-01-10 DIAGNOSIS — L21.9 SEBORRHEIC DERMATITIS OF SCALP: ICD-10-CM

## 2020-01-10 RX ORDER — TRIAMCINOLONE ACETONIDE 1 MG/ML
LOTION TOPICAL
Qty: 60 ML | Refills: 2 | Status: SHIPPED | OUTPATIENT
Start: 2020-01-10 | End: 2021-06-02 | Stop reason: SDUPTHER

## 2020-01-10 NOTE — TELEPHONE ENCOUNTER
Was the patient seen in the last year in this department? Yes LOV 10/07/19    Does patient have an active prescription for medications requested? No     Received Request Via: Pharmacy

## 2020-01-16 DIAGNOSIS — N89.8 VAGINAL ITCHING: ICD-10-CM

## 2020-01-16 NOTE — TELEPHONE ENCOUNTER
Was the patient seen in the last year in this department? Yes lov 10/7/19    Does patient have an active prescription for medications requested? No     Received Request Via: Pharmacy

## 2020-01-17 RX ORDER — CLOTRIMAZOLE AND BETAMETHASONE DIPROPIONATE 10; .64 MG/G; MG/G
1 CREAM TOPICAL 2 TIMES DAILY
Qty: 30 G | Refills: 1 | Status: SHIPPED | OUTPATIENT
Start: 2020-01-17 | End: 2020-04-14 | Stop reason: SDUPTHER

## 2020-01-19 DIAGNOSIS — M85.80 OSTEOPENIA, UNSPECIFIED LOCATION: ICD-10-CM

## 2020-01-20 RX ORDER — ALENDRONATE SODIUM 70 MG/1
TABLET ORAL
Qty: 12 TAB | Refills: 2 | Status: SHIPPED | OUTPATIENT
Start: 2020-01-20 | End: 2020-09-29 | Stop reason: SDUPTHER

## 2020-01-20 NOTE — TELEPHONE ENCOUNTER
Was the patient seen in the last year in this department? YesLOV 10/07/2019 LABS 06/12/19    Does patient have an active prescription for medications requested? No     Received Request Via: Pharmacy

## 2020-02-07 ENCOUNTER — OFFICE VISIT (OUTPATIENT)
Dept: MEDICAL GROUP | Facility: PHYSICIAN GROUP | Age: 70
End: 2020-02-07
Payer: MEDICARE

## 2020-02-07 VITALS
WEIGHT: 213 LBS | OXYGEN SATURATION: 95 % | DIASTOLIC BLOOD PRESSURE: 76 MMHG | TEMPERATURE: 98.3 F | HEART RATE: 100 BPM | HEIGHT: 64 IN | SYSTOLIC BLOOD PRESSURE: 132 MMHG | BODY MASS INDEX: 36.37 KG/M2

## 2020-02-07 DIAGNOSIS — B37.2 INTERTRIGINOUS CANDIDIASIS: ICD-10-CM

## 2020-02-07 DIAGNOSIS — J18.9 COMMUNITY ACQUIRED PNEUMONIA, UNSPECIFIED LATERALITY: ICD-10-CM

## 2020-02-07 PROCEDURE — 99214 OFFICE O/P EST MOD 30 MIN: CPT | Performed by: FAMILY MEDICINE

## 2020-02-07 RX ORDER — CODEINE PHOSPHATE/GUAIFENESIN 10-100MG/5
5-10 LIQUID (ML) ORAL 3 TIMES DAILY PRN
Qty: 200 ML | Refills: 0 | Status: SHIPPED | OUTPATIENT
Start: 2020-02-07 | End: 2020-02-07

## 2020-02-07 RX ORDER — AMOXICILLIN 500 MG/1
1000 CAPSULE ORAL 3 TIMES DAILY
Qty: 30 CAP | Refills: 0 | Status: SHIPPED | OUTPATIENT
Start: 2020-02-07 | End: 2020-02-11 | Stop reason: SDUPTHER

## 2020-02-07 RX ORDER — CODEINE PHOSPHATE/GUAIFENESIN 10-100MG/5
5-10 LIQUID (ML) ORAL 3 TIMES DAILY PRN
Qty: 200 ML | Refills: 0 | Status: SHIPPED | OUTPATIENT
Start: 2020-02-07 | End: 2020-02-17

## 2020-02-07 RX ORDER — NYSTATIN 100000 [USP'U]/G
POWDER TOPICAL
Qty: 15 G | Refills: 1 | Status: SHIPPED | OUTPATIENT
Start: 2020-02-07 | End: 2020-04-14 | Stop reason: SDUPTHER

## 2020-02-07 ASSESSMENT — PATIENT HEALTH QUESTIONNAIRE - PHQ9: CLINICAL INTERPRETATION OF PHQ2 SCORE: 0

## 2020-02-07 NOTE — PROGRESS NOTES
Chief Complaint   Patient presents with   • Cough     x9 days cough congestion sinus pressure    • Rash     right upper thigh x 2 months      Cough and congestion for 9 days    HISTORY OF PRESENT ILLNESS: Patient is a 69 y.o. female established patient who presents today for the following.      1. Intertriginous candidiasis  Patient has a one-week history of a red rash on the right side under the belly fold.  Not particularly itchy but bothersome and slightly painful to the patient.  She has tried a combination antifungal, steroid cream without resolution.    2. Community acquired pneumonia, unspecified laterality  Patient has approximately 10-day history of cough congestion.  Patient has productive cough, that is persistent and keeping her awake at night.  Initially had a mild degree of subjective fever and chills which have resolved itself.  No nausea or vomiting shortness of breath or chest pain.      No problem-specific Assessment & Plan notes found for this encounter.      She  has a past medical history of Arthritis, Disorder of thyroid, High cholesterol, Hyperlipidemia, Pain (4/22/16), and Pain (1/27/17).    Patient Active Problem List    Diagnosis Date Noted   • Screening mammogram for high-risk patient 10/07/2019   • Chronic left-sided low back pain with left-sided sciatica 07/08/2019   • Encounter for post fall examination 01/14/2019   • Seborrheic dermatitis of scalp 04/04/2018   • Vitamin D deficiency 03/31/2017   • Obesity (BMI 30-39.9) 03/28/2017   • Derangement of medial meniscus of right knee due to old injury 01/30/2017   • Left hand pain 04/27/2016   • Degenerative arthritis of lumbar spine 02/17/2015   • Abnormal MRI, lumbar spine 02/17/2015   • Osteopenia 02/17/2015   • Hyperlipidemia 02/17/2015   • Family history of diabetes mellitus 02/17/2015       Allergies:Meloxicam    Current Outpatient Medications   Medication Sig Dispense Refill   • amoxicillin (AMOXIL) 500 MG Cap Take 2 Caps by mouth 3  times a day for 5 days. 30 Cap 0   • guaifenesin-codeine (TUSSI-ORGANIDIN NR) 100-10 MG/5ML syrup Take 5-10 mL by mouth 3 times a day as needed for Cough for up to 10 days. Do not operate a vehicle while taking this medication. 200 mL 0   • nystatin (MYCOSTATIN) powder Apply to affected area TID for 7 days 15 g 1   • alendronate (FOSAMAX) 70 MG Tab TAKE 1 TABLET BY MOUTH ONCE A WEEK ON  AN  EMPTY  STOMACH  WITH  A  FULL  GLASS  OF  WATER.  REMAIN  UPRIGHT. 12 Tab 2   • levothyroxine (SYNTHROID) 50 MCG Tab TAKE 1 TABLET BY MOUTH ONCE DAILY IN THE MORNING ON AN EMPTY STOMACH 90 Tab 3   • hydrocodone/acetaminophen (NORCO)  MG Tab Take 0.5-1 Tabs by mouth every four hours as needed. 60 Tab 0   • clotrimazole-betamethasone (LOTRISONE) 1-0.05 % Cream Apply 1 Squirt to affected area(s) 2 times a day. 30 g 1   • triamcinolone (KENALOG) 0.1 % lotion APPLY TO SCALP AFTER SHAMPOOING WITH SELSUN BLUE SHAMPOO 60 mL 2   • piroxicam (FELDENE) 10 MG Cap Take 1 Cap by mouth 2 times a day, with meals. 180 Cap 3   • predniSONE (DELTASONE) 10 MG Tab Take with food. Take 6 pills on day 1, then 5 pills on days 2 and 3, then 4 pills on days 4 and 5, then 3 pills on days 6 and 7, then 2 pills on days 8 and 9, then 1 pill on day 10, then stop. (Patient not taking: Reported on 10/7/2019) 35 Tab 0   • tizanidine (ZANAFLEX) 4 MG Tab Take 1 Tab by mouth every 6 hours as needed. For muscle spasm 30 Tab 3   • methylPREDNISolone (MEDROL DOSEPAK) 4 MG Tablet Therapy Pack Take 1 Tab by mouth every day. (Patient not taking: Reported on 7/17/2019) 1 Kit 0   • cetirizine (ZYRTEC) 10 MG Tab Take 10 mg by mouth every day.     • pravastatin (PRAVACHOL) 40 MG tablet TAKE ONE TABLET BY MOUTH ONCE DAILY AT BEDTIME 90 Tab 3   • Salicylic Acid (SELSUN BLUE DEEP CLEANSING) 3 % Shampoo Shampoo hair with this every other day 1 Bottle 3   • vitamin D3, cholecalciferol, 1000 UNIT Tab Take 1 Tab by mouth every day. 100 Tab 3   • acetaminophen (TYLENOL) 500 MG  "Tab Take 500-1,000 mg by mouth 3 times a day.     • ascorbic acid (ASCORBIC ACID) 500 MG Tab Take 1,000 mg by mouth every day.     • BETA CAROTENE PO Take 10,000 Int'l Units by mouth every day.     • B Complex-C-Folic Acid (STRESS B COMPLEX PO) Take  by mouth 2 Times a Day.     • Non Formulary Request 2 Times a Day. Zyflamend organic anti-inflammatory     • Red Yeast Rice Extract (RED YEAST RICE PO) Take  by mouth 2 Times a Day.     • Calcium 1500 MG TABS Take  by mouth every day.     • Magnesium 500 MG CAPS Take 875 mg by mouth every evening.     • Potassium (POTASSIMIN PO) Take 45 mg by mouth every day.       No current facility-administered medications for this visit.        Social History     Tobacco Use   • Smoking status: Never Smoker   • Smokeless tobacco: Never Used   Substance Use Topics   • Alcohol use: No   • Drug use: No       Family History   Problem Relation Age of Onset   • Stroke Father        Health Maintenance:      Review of Systems   No fever, chills, nausea, vomiting, diarrhea, chest pain or shortness of breath.  See HPI as the fever and chills have resolved    Exam:  /76 (BP Location: Left arm, Patient Position: Sitting, BP Cuff Size: Large adult)   Pulse 100   Temp 36.8 °C (98.3 °F) (Temporal)   Ht 1.626 m (5' 4\")   Wt 96.6 kg (213 lb)   SpO2 95%  Body mass index is 36.56 kg/m².  Constitutional:  NAD, well appearing.  HEENT:   NC/AT, PERRLA, EOMI, OP clear, no lymphadenopathy, no thyromegaly.    Cardiovascular: RRR.   No m/r/g. No carotid bruits.       Lungs:   CTAB, no w/r/r, no respiratory distress.  No focal areas of consolidation.  Patient does have a significant productive cough.  Abdomen: Soft, NT/ND + BS, no masses, no suprapubic tenderness, no hepatomegaly.  Extremities:  2+ DP and radial pulses bilaterally.  No c/c/e.  Skin:  Warm and dry.    Neurologic: Alert & oriented x 3, CN II-XII grossly intact, strength and sensation grossly intact.  No focal deficits " noted.  Psychiatric:  Affect normal, mood normal, judgment normal.    Assessment/Plan:     1. Intertriginous candidiasis  Diagnosis as above.  Have ordered the patient a topical antifungal powder and given her direction on keeping the area dry.    2. Community acquired pneumonia, unspecified laterality  Patient appears to have initially had a viral infection that has progressed to a bacterial infection.  Medications as below.  Patient requests a cough medication and the risks benefits and alternatives were discussed with the patient.  Patient informed not to drive with this medication and to use at night before bed.  - amoxicillin (AMOXIL) 500 MG Cap; Take 2 Caps by mouth 3 times a day for 5 days.  Dispense: 30 Cap; Refill: 0  - guaifenesin-codeine (TUSSI-ORGANIDIN NR) 100-10 MG/5ML syrup; Take 5-10 mL by mouth 3 times a day as needed for Cough for up to 10 days. Do not operate a vehicle while taking this medication.  Dispense: 200 mL; Refill: 0        Followup: No follow-ups on file.    Please note that this dictation was created using voice recognition software. I have made every reasonable attempt to correct obvious errors, but I expect that there are errors of grammar and possibly content that I did not discover before finalizing the note.

## 2020-02-07 NOTE — PROGRESS NOTES
Annual Health Assessment Questions:    1.  Are you currently engaging in any exercise or physical activity? No    2.  How would you describe your mood or emotional well-being today? fair    3.  Have you had any falls in the last year? No    4.  Have you noticed any problems with your balance or had difficulty walking? No    5.  In the last six months have you experienced any leakage of urine? No    6. DPA/Advanced Directive: Patient has Advanced Directive on file.

## 2020-02-11 DIAGNOSIS — J18.9 COMMUNITY ACQUIRED PNEUMONIA, UNSPECIFIED LATERALITY: ICD-10-CM

## 2020-02-11 RX ORDER — AMOXICILLIN 500 MG/1
1000 CAPSULE ORAL 3 TIMES DAILY
Qty: 30 CAP | Refills: 0 | Status: SHIPPED | OUTPATIENT
Start: 2020-02-11 | End: 2020-02-16

## 2020-02-12 PROBLEM — Z86.010 PERSONAL HISTORY OF COLONIC POLYPS: Status: ACTIVE | Noted: 2020-02-12

## 2020-02-12 PROBLEM — Z86.0100 PERSONAL HISTORY OF COLONIC POLYPS: Status: ACTIVE | Noted: 2020-02-12

## 2020-03-03 DIAGNOSIS — E78.2 MIXED HYPERLIPIDEMIA: ICD-10-CM

## 2020-03-03 RX ORDER — PRAVASTATIN SODIUM 40 MG
TABLET ORAL
Qty: 100 TAB | Refills: 2 | Status: SHIPPED | OUTPATIENT
Start: 2020-03-03 | End: 2020-12-09 | Stop reason: SDUPTHER

## 2020-03-03 NOTE — TELEPHONE ENCOUNTER
Received request via: Pharmacy    Was the patient seen in the last year in this department? Yes LOV 02/12/2020    Does the patient have an active prescription (recently filled or refills available) for medication(s) requested? No

## 2020-03-18 NOTE — PROGRESS NOTES
Called patient to wish her a happy birthday and she requested to be on Dr. Oliveira waiting list for new patient.

## 2020-04-08 ENCOUNTER — TELEPHONE (OUTPATIENT)
Dept: MEDICAL GROUP | Facility: PHYSICIAN GROUP | Age: 70
End: 2020-04-08

## 2020-04-08 ENCOUNTER — PATIENT OUTREACH (OUTPATIENT)
Dept: HEALTH INFORMATION MANAGEMENT | Facility: OTHER | Age: 70
End: 2020-04-08

## 2020-04-08 NOTE — PROGRESS NOTES
Special Requests: Her  is a patient of Dr. Menon and would also like to see him as her primary provider after Dr. Garcia leaves.     Alfonso Redd,    I’m sorry but Dr. Menon is extremely over paneled and cannot take on any new patients at this time.    Thanks    Alfonso Ramos,    Do you have a waiting list for May or June for him; I know its months away but would this be a possibility? I know she stated she has a last visit with Dr. Garcia next month to get refills and stay up to date with her health but would love to stay in the same office as her .     Thank you,  Ivania Hamilton,    Dr. Menon is 350 + over paneled, so we do not plan on opening him any time soon.    Doris Childers will be her option to stay at [facility].     Relayed to the patient about no providers at [facility] at this time other than Doris Childers

## 2020-04-08 NOTE — TELEPHONE ENCOUNTER
ESTABLISHED PATIENT PRE-VISIT PLANNING     Patient was NOT contacted to complete PVP.    1.  Reviewed notes from the last few office visits within the medical group: Yes    2.  If any orders were placed at last visit or intended to be done for this visit (i.e. 6 mos follow-up), do we have Results/Consult Notes?        •  Labs - Labs were not ordered at last office visit.       •  Imaging - Imaging ordered, completed and results are in chart.       •  Referrals - No referrals were ordered at last office visit.    3. Is this appointment scheduled as a Hospital Follow-Up? No    4.  Immunizations were updated in [facility] using WebIZ?: Yes       •  Web Iz Recommendations: HEPATITIS A , MMR  and TD    5.  Patient is due for the following Health Maintenance Topics:   Health Maintenance Due   Topic Date Due   • Annual Wellness Visit  1950   • HEPATITIS C SCREENING  1950   • IMM ZOSTER VACCINES (2 of 3) 05/22/2012   • IMM DTaP/Tdap/Td Vaccine (3 - Td) 08/24/2019     6. Orders for overdue Health Maintenance topics pended in Pre-Charting? NO    7.  AHA (MDX) form printed for Provider? YES    8.  Patient was NOT informed to arrive 15 min prior to their scheduled appointment and bring in their medication bottles.

## 2020-04-14 ENCOUNTER — OFFICE VISIT (OUTPATIENT)
Dept: MEDICAL GROUP | Facility: PHYSICIAN GROUP | Age: 70
End: 2020-04-14
Payer: MEDICARE

## 2020-04-14 DIAGNOSIS — G89.29 CHRONIC LEFT-SIDED LOW BACK PAIN WITH LEFT-SIDED SCIATICA: ICD-10-CM

## 2020-04-14 DIAGNOSIS — M85.80 OSTEOPENIA, UNSPECIFIED LOCATION: ICD-10-CM

## 2020-04-14 DIAGNOSIS — R53.83 FATIGUE, UNSPECIFIED TYPE: ICD-10-CM

## 2020-04-14 DIAGNOSIS — N89.8 VAGINAL ITCHING: ICD-10-CM

## 2020-04-14 DIAGNOSIS — M54.42 CHRONIC LEFT-SIDED LOW BACK PAIN WITH LEFT-SIDED SCIATICA: ICD-10-CM

## 2020-04-14 DIAGNOSIS — E78.2 MIXED HYPERLIPIDEMIA: ICD-10-CM

## 2020-04-14 DIAGNOSIS — M47.816 OSTEOARTHRITIS OF LUMBAR SPINE, UNSPECIFIED SPINAL OSTEOARTHRITIS COMPLICATION STATUS: ICD-10-CM

## 2020-04-14 DIAGNOSIS — E55.9 VITAMIN D DEFICIENCY: ICD-10-CM

## 2020-04-14 PROCEDURE — 99443 PR PHYSICIAN TELEPHONE EVALUATION 21-30 MIN: CPT | Mod: CR | Performed by: FAMILY MEDICINE

## 2020-04-14 RX ORDER — NYSTATIN 100000 [USP'U]/G
POWDER TOPICAL
Qty: 60 G | Refills: 2 | Status: SHIPPED | OUTPATIENT
Start: 2020-04-14 | End: 2020-10-22

## 2020-04-14 RX ORDER — CLOTRIMAZOLE AND BETAMETHASONE DIPROPIONATE 10; .64 MG/G; MG/G
1 CREAM TOPICAL 2 TIMES DAILY
Qty: 30 G | Refills: 1 | Status: SHIPPED | OUTPATIENT
Start: 2020-04-14 | End: 2020-10-22

## 2020-04-14 NOTE — PROGRESS NOTES
In order to limit the spread of COVID 19 Virus, this visit was conducted by telephone at the patient's request. The patient consented to this telephone visit.  Start of Visit   1:37 PM               End of Visit     1:59 PM            Length of Visit22 minutes    1. Mixed hyperlipidemia  CBC WITH DIFFERENTIAL    Comp Metabolic Panel    Lipid Profile    URINALYSIS,CULTURE IF INDICATED   2. Osteoarthritis of lumbar spine, unspecified spinal osteoarthritis complication status     3. Chronic left-sided low back pain with left-sided sciatica     4. Osteopenia, unspecified location     5. Vaginal itching  nystatin (MYCOSTATIN) powder    clotrimazole-betamethasone (LOTRISONE) 1-0.05 % Cream   6. Fatigue, unspecified type  TSH    FREE THYROXINE   7. Vitamin D deficiency  VITAMIN D 25-HYDROXY     The patient continues to have ongoing problems with her low back pain but she says she is living with it.  She needs refills of nystatin powder and Lotrisone cream which she uses for vaginal itching with good results.  She is more fatigued than she would like.  I am going to order some lab work for her to check this out.  She does not have chest pains or shortness of breath.  She has had a cough since January but does not have a fever or body aches.  She says it feels like a cold.  The cough is getting better she says.    I reviewed the following    Past Medical History:   Diagnosis Date   • Arthritis     to joints   • Disorder of thyroid     hypoactive   • High cholesterol    • Hyperlipidemia    • Pain 4/22/16    low back-chronic   • Pain 1/27/17    right knee        Past Surgical History:   Procedure Laterality Date   • KNEE ARTHROSCOPY Right 1/30/2017    Procedure: KNEE ARTHROSCOPY;  Surgeon: Will Mattson M.D.;  Location: [facility];  Service:    • MEDIAL MENISCECTOMY  1/30/2017    Procedure: MEDIAL MENISCECTOMY - PARTIAL;  Surgeon: Will Mattson M.D.;  Location: [facility];  Service:    •  TRIGGER FINGER RELEASE Left 4/27/2016    Procedure: TRIGGER FINGER RELEASE - THUMB;  Surgeon: Bhavin Barker M.D.;  Location: SURGERY [facility];  Service:    • DEQUERVAINS RELEASE Right 3/2015    wrist   • COLONOSCOPY  2009   • SHOULDER SURGERY Left 2008    removal of bone spur left shoulder       Allergies   Allergen Reactions   • Meloxicam      nausea       Current Outpatient Medications   Medication Sig Dispense Refill   • nystatin (MYCOSTATIN) powder Apply to affected area TID for 7 days 60 g 2   • clotrimazole-betamethasone (LOTRISONE) 1-0.05 % Cream Apply 1 Squirt to affected area(s) 2 times a day. 30 g 1   • pravastatin (PRAVACHOL) 40 MG tablet TAKE ONE TABLET BY MOUTH ONCE DAILY AT BEDTIME 100 Tab 2   • alendronate (FOSAMAX) 70 MG Tab TAKE 1 TABLET BY MOUTH ONCE A WEEK ON  AN  EMPTY  STOMACH  WITH  A  FULL  GLASS  OF  WATER.  REMAIN  UPRIGHT. 12 Tab 2   • triamcinolone (KENALOG) 0.1 % lotion APPLY TO SCALP AFTER SHAMPOOING WITH SELSUN BLUE SHAMPOO 60 mL 2   • piroxicam (FELDENE) 10 MG Cap Take 1 Cap by mouth 2 times a day, with meals. 180 Cap 3   • predniSONE (DELTASONE) 10 MG Tab Take with food. Take 6 pills on day 1, then 5 pills on days 2 and 3, then 4 pills on days 4 and 5, then 3 pills on days 6 and 7, then 2 pills on days 8 and 9, then 1 pill on day 10, then stop. (Patient not taking: Reported on 10/7/2019) 35 Tab 0   • tizanidine (ZANAFLEX) 4 MG Tab Take 1 Tab by mouth every 6 hours as needed. For muscle spasm 30 Tab 3   • methylPREDNISolone (MEDROL DOSEPAK) 4 MG Tablet Therapy Pack Take 1 Tab by mouth every day. (Patient not taking: Reported on 7/17/2019) 1 Kit 0   • levothyroxine (SYNTHROID) 50 MCG Tab TAKE 1 TABLET BY MOUTH ONCE DAILY IN THE MORNING ON AN EMPTY STOMACH 90 Tab 3   • cetirizine (ZYRTEC) 10 MG Tab Take 10 mg by mouth every day.     • Salicylic Acid (SELSUN BLUE DEEP CLEANSING) 3 % Shampoo Shampoo hair with this every other day 1 Bottle 3   • vitamin D3, cholecalciferol,  1000 UNIT Tab Take 1 Tab by mouth every day. 100 Tab 3   • hydrocodone/acetaminophen (NORCO)  MG Tab Take 0.5-1 Tabs by mouth every four hours as needed. 60 Tab 0   • acetaminophen (TYLENOL) 500 MG Tab Take 500-1,000 mg by mouth 3 times a day.     • ascorbic acid (ASCORBIC ACID) 500 MG Tab Take 1,000 mg by mouth every day.     • BETA CAROTENE PO Take 10,000 Int'l Units by mouth every day.     • B Complex-C-Folic Acid (STRESS B COMPLEX PO) Take  by mouth 2 Times a Day.     • Non Formulary Request 2 Times a Day. Zyflamend organic anti-inflammatory     • Red Yeast Rice Extract (RED YEAST RICE PO) Take  by mouth 2 Times a Day.     • Calcium 1500 MG TABS Take  by mouth every day.     • Magnesium 500 MG CAPS Take 875 mg by mouth every evening.     • Potassium (POTASSIMIN PO) Take 45 mg by mouth every day.       No current facility-administered medications for this visit.         Family History   Problem Relation Age of Onset   • Stroke Father        Social History     Socioeconomic History   • Marital status:      Spouse name: Not on file   • Number of children: Not on file   • Years of education: Not on file   • Highest education level: Not on file   Occupational History   • Not on file   Social Needs   • Financial resource strain: Not on file   • Food insecurity     Worry: Not on file     Inability: Not on file   • Transportation needs     Medical: Not on file     Non-medical: Not on file   Tobacco Use   • Smoking status: Never Smoker   • Smokeless tobacco: Never Used   Substance and Sexual Activity   • Alcohol use: No   • Drug use: No   • Sexual activity: Yes     Partners: Male     Birth control/protection: Post-Menopausal   Lifestyle   • Physical activity     Days per week: Not on file     Minutes per session: Not on file   • Stress: Not on file   Relationships   • Social connections     Talks on phone: Not on file     Gets together: Not on file     Attends Taoist service: Not on file     Active member  of club or organization: Not on file     Attends meetings of clubs or organizations: Not on file     Relationship status: Not on file   • Intimate partner violence     Fear of current or ex partner: Not on file     Emotionally abused: Not on file     Physically abused: Not on file     Forced sexual activity: Not on file   Other Topics Concern   • Not on file   Social History Narrative   • Not on file      Please note that this dictation was created using voice recognition software. I have worked with consultants from the vendor as well as technical experts from [facility] to optimize the interface. I have made every reasonable attempt to correct obvious errors, but I expect that there are errors of grammar and possibly content that I did not discover before finalizing the note.

## 2020-06-09 DIAGNOSIS — E03.8 OTHER SPECIFIED HYPOTHYROIDISM: ICD-10-CM

## 2020-06-09 RX ORDER — LEVOTHYROXINE SODIUM 50 UG/1
TABLET ORAL
Qty: 120 TAB | Refills: 0 | Status: SHIPPED | OUTPATIENT
Start: 2020-06-09 | End: 2020-06-23

## 2020-06-09 NOTE — TELEPHONE ENCOUNTER
Received request via: Pharmacy    Was the patient seen in the last year in this department? Yes 4/14/20    Does the patient have an active prescription (recently filled or refills available) for medication(s) requested? No     Patient is set to establish with Stacey Mendez on 6/23/20

## 2020-06-09 NOTE — TELEPHONE ENCOUNTER
Requested Prescriptions     Signed Prescriptions Disp Refills   • EUTHYROX 50 MCG Tab 120 Tab 0     Sig: TAKE 1 TABLET BY MOUTH ONCE DAILY IN THE MORNING ON AN EMPTY STOMACH     Authorizing Provider: FLOR POP A.P.R.N.

## 2020-06-12 ENCOUNTER — TELEPHONE (OUTPATIENT)
Dept: MEDICAL GROUP | Facility: PHYSICIAN GROUP | Age: 70
End: 2020-06-12

## 2020-06-12 NOTE — TELEPHONE ENCOUNTER
ESTABLISHED PATIENT PRE-VISIT PLANNING     Patient was contacted to complete PVP.    1.  Reviewed notes from the last few office visits within the medical group: Yes,  LOV 04/14/20  (telephone-covid- 19)    2.  If any orders were placed at last visit or intended to be done for this visit (i.e. 6 mos follow-up), do we have Results/Consult Notes?        •  Labs - Labs ordered, NOT completed. Patient advised to complete prior to next appointment.       •  Imaging - Imaging was not ordered at last office visit.       •  Referrals - No referrals were ordered at last office visit.    3. Is this appointment scheduled as a Hospital Follow-Up? No    4.  Immunizations were updated in [facility] using WebIZ?: Yes       •  Web Iz Recommendations: HEPATITIS A , MMR , TD and SHINGRIX (Shingles)    5.  Patient is due for the following Health Maintenance Topics:   Health Maintenance Due   Topic Date Due   • Annual Wellness Visit  1950   • HEPATITIS C SCREENING  1950   • IMM ZOSTER VACCINES (2 of 3) 05/22/2012   • IMM DTaP/Tdap/Td Vaccine (3 - Td) 08/24/2019     6. Orders for overdue Health Maintenance topics pended in Pre-Charting? NO    7.  AHA (MDX) form printed for Provider? YES    8.  Patient was informed to arrive 15 min prior to their scheduled appointment and bring in their medication bottles.

## 2020-06-16 ENCOUNTER — HOSPITAL ENCOUNTER (OUTPATIENT)
Dept: LAB | Facility: MEDICAL CENTER | Age: 70
End: 2020-06-16
Attending: FAMILY MEDICINE
Payer: MEDICARE

## 2020-06-16 DIAGNOSIS — E78.2 MIXED HYPERLIPIDEMIA: ICD-10-CM

## 2020-06-16 DIAGNOSIS — R53.83 FATIGUE, UNSPECIFIED TYPE: ICD-10-CM

## 2020-06-23 ENCOUNTER — HOSPITAL ENCOUNTER (OUTPATIENT)
Dept: LAB | Facility: MEDICAL CENTER | Age: 70
End: 2020-06-23
Attending: NURSE PRACTITIONER
Payer: MEDICARE

## 2020-06-23 ENCOUNTER — OFFICE VISIT (OUTPATIENT)
Dept: MEDICAL GROUP | Facility: PHYSICIAN GROUP | Age: 70
End: 2020-06-23
Payer: MEDICARE

## 2020-06-23 VITALS
TEMPERATURE: 96.7 F | HEART RATE: 73 BPM | HEIGHT: 64 IN | WEIGHT: 214 LBS | RESPIRATION RATE: 16 BRPM | DIASTOLIC BLOOD PRESSURE: 70 MMHG | SYSTOLIC BLOOD PRESSURE: 114 MMHG | BODY MASS INDEX: 36.54 KG/M2 | OXYGEN SATURATION: 95 %

## 2020-06-23 DIAGNOSIS — Z76.89 ESTABLISHING CARE WITH NEW DOCTOR, ENCOUNTER FOR: ICD-10-CM

## 2020-06-23 DIAGNOSIS — L98.9 SKIN LESION OF RIGHT LEG: ICD-10-CM

## 2020-06-23 DIAGNOSIS — M85.80 OSTEOPENIA, UNSPECIFIED LOCATION: ICD-10-CM

## 2020-06-23 DIAGNOSIS — Z72.89 OTHER PROBLEMS RELATED TO LIFESTYLE: ICD-10-CM

## 2020-06-23 DIAGNOSIS — E78.2 MIXED HYPERLIPIDEMIA: ICD-10-CM

## 2020-06-23 DIAGNOSIS — Z23 NEED FOR VACCINATION: ICD-10-CM

## 2020-06-23 DIAGNOSIS — E55.9 VITAMIN D DEFICIENCY: ICD-10-CM

## 2020-06-23 DIAGNOSIS — J30.2 SEASONAL ALLERGIES: ICD-10-CM

## 2020-06-23 DIAGNOSIS — E66.9 OBESITY (BMI 30-39.9): ICD-10-CM

## 2020-06-23 DIAGNOSIS — R21 RASH: ICD-10-CM

## 2020-06-23 DIAGNOSIS — E03.9 ACQUIRED HYPOTHYROIDISM: ICD-10-CM

## 2020-06-23 DIAGNOSIS — Z11.59 NEED FOR HEPATITIS C SCREENING TEST: ICD-10-CM

## 2020-06-23 DIAGNOSIS — Z78.0 POSTMENOPAUSAL: ICD-10-CM

## 2020-06-23 PROBLEM — Z04.3 ENCOUNTER FOR POST FALL EXAMINATION: Status: RESOLVED | Noted: 2019-01-14 | Resolved: 2020-06-23

## 2020-06-23 PROBLEM — Z86.010 PERSONAL HISTORY OF COLONIC POLYPS: Status: RESOLVED | Noted: 2020-02-12 | Resolved: 2020-06-23

## 2020-06-23 PROBLEM — L21.9 SEBORRHEIC DERMATITIS OF SCALP: Status: RESOLVED | Noted: 2018-04-04 | Resolved: 2020-06-23

## 2020-06-23 PROBLEM — Z12.31 SCREENING MAMMOGRAM FOR HIGH-RISK PATIENT: Status: RESOLVED | Noted: 2019-10-07 | Resolved: 2020-06-23

## 2020-06-23 PROBLEM — Z86.0100 PERSONAL HISTORY OF COLONIC POLYPS: Status: RESOLVED | Noted: 2020-02-12 | Resolved: 2020-06-23

## 2020-06-23 LAB
25(OH)D3 SERPL-MCNC: 78 NG/ML (ref 30–100)
ALBUMIN SERPL BCP-MCNC: 4 G/DL (ref 3.2–4.9)
ALBUMIN/GLOB SERPL: 1.5 G/DL
ALP SERPL-CCNC: 60 U/L (ref 30–99)
ALT SERPL-CCNC: 25 U/L (ref 2–50)
ANION GAP SERPL CALC-SCNC: 10 MMOL/L (ref 7–16)
AST SERPL-CCNC: 25 U/L (ref 12–45)
BASOPHILS # BLD AUTO: 0.7 % (ref 0–1.8)
BASOPHILS # BLD: 0.04 K/UL (ref 0–0.12)
BILIRUB SERPL-MCNC: 0.6 MG/DL (ref 0.1–1.5)
BUN SERPL-MCNC: 16 MG/DL (ref 8–22)
CALCIUM SERPL-MCNC: 9.7 MG/DL (ref 8.5–10.5)
CHLORIDE SERPL-SCNC: 104 MMOL/L (ref 96–112)
CHOLEST SERPL-MCNC: 177 MG/DL (ref 100–199)
CO2 SERPL-SCNC: 27 MMOL/L (ref 20–33)
CREAT SERPL-MCNC: 1.03 MG/DL (ref 0.5–1.4)
EOSINOPHIL # BLD AUTO: 0.18 K/UL (ref 0–0.51)
EOSINOPHIL NFR BLD: 3.3 % (ref 0–6.9)
ERYTHROCYTE [DISTWIDTH] IN BLOOD BY AUTOMATED COUNT: 44.7 FL (ref 35.9–50)
FASTING STATUS PATIENT QL REPORTED: NORMAL
GLOBULIN SER CALC-MCNC: 2.6 G/DL (ref 1.9–3.5)
GLUCOSE SERPL-MCNC: 94 MG/DL (ref 65–99)
HCT VFR BLD AUTO: 49.4 % (ref 37–47)
HCV AB SER QL: NORMAL
HDLC SERPL-MCNC: 55 MG/DL
HGB BLD-MCNC: 15.6 G/DL (ref 12–16)
IMM GRANULOCYTES # BLD AUTO: 0.02 K/UL (ref 0–0.11)
IMM GRANULOCYTES NFR BLD AUTO: 0.4 % (ref 0–0.9)
LDLC SERPL CALC-MCNC: 89 MG/DL
LYMPHOCYTES # BLD AUTO: 1.32 K/UL (ref 1–4.8)
LYMPHOCYTES NFR BLD: 24.4 % (ref 22–41)
MCH RBC QN AUTO: 28.1 PG (ref 27–33)
MCHC RBC AUTO-ENTMCNC: 31.6 G/DL (ref 33.6–35)
MCV RBC AUTO: 89 FL (ref 81.4–97.8)
MONOCYTES # BLD AUTO: 0.48 K/UL (ref 0–0.85)
MONOCYTES NFR BLD AUTO: 8.9 % (ref 0–13.4)
NEUTROPHILS # BLD AUTO: 3.37 K/UL (ref 2–7.15)
NEUTROPHILS NFR BLD: 62.3 % (ref 44–72)
NRBC # BLD AUTO: 0 K/UL
NRBC BLD-RTO: 0 /100 WBC
PLATELET # BLD AUTO: 214 K/UL (ref 164–446)
PMV BLD AUTO: 10.2 FL (ref 9–12.9)
POTASSIUM SERPL-SCNC: 4.4 MMOL/L (ref 3.6–5.5)
PROT SERPL-MCNC: 6.6 G/DL (ref 6–8.2)
RBC # BLD AUTO: 5.55 M/UL (ref 4.2–5.4)
SODIUM SERPL-SCNC: 141 MMOL/L (ref 135–145)
T3FREE SERPL-MCNC: 2.94 PG/ML (ref 2–4.4)
T4 FREE SERPL-MCNC: 1.4 NG/DL (ref 0.93–1.7)
TRIGL SERPL-MCNC: 163 MG/DL (ref 0–149)
TSH SERPL DL<=0.005 MIU/L-ACNC: 2.97 UIU/ML (ref 0.38–5.33)
WBC # BLD AUTO: 5.4 K/UL (ref 4.8–10.8)

## 2020-06-23 PROCEDURE — 8041 PR SCP AHA: Performed by: NURSE PRACTITIONER

## 2020-06-23 PROCEDURE — 84443 ASSAY THYROID STIM HORMONE: CPT

## 2020-06-23 PROCEDURE — 80053 COMPREHEN METABOLIC PANEL: CPT

## 2020-06-23 PROCEDURE — 82306 VITAMIN D 25 HYDROXY: CPT

## 2020-06-23 PROCEDURE — 84439 ASSAY OF FREE THYROXINE: CPT

## 2020-06-23 PROCEDURE — 85025 COMPLETE CBC W/AUTO DIFF WBC: CPT

## 2020-06-23 PROCEDURE — G0472 HEP C SCREEN HIGH RISK/OTHER: HCPCS

## 2020-06-23 PROCEDURE — 36415 COLL VENOUS BLD VENIPUNCTURE: CPT

## 2020-06-23 PROCEDURE — 84481 FREE ASSAY (FT-3): CPT

## 2020-06-23 PROCEDURE — 80061 LIPID PANEL: CPT

## 2020-06-23 PROCEDURE — 99215 OFFICE O/P EST HI 40 MIN: CPT | Performed by: NURSE PRACTITIONER

## 2020-06-23 RX ORDER — CLOSTRIDIUM TETANI TOXOID ANTIGEN (FORMALDEHYDE INACTIVATED), CORYNEBACTERIUM DIPHTHERIAE TOXOID ANTIGEN (FORMALDEHYDE INACTIVATED), BORDETELLA PERTUSSIS TOXOID ANTIGEN (GLUTARALDEHYDE INACTIVATED), BORDETELLA PERTUSSIS FILAMENTOUS HEMAGGLUTININ ANTIGEN (FORMALDEHYDE INACTIVATED), BORDETELLA PERTUSSIS PERTACTIN ANTIGEN, AND BORDETELLA PERTUSSIS FIMBRIAE 2/3 ANTIGEN 5; 2; 2.5; 5; 3; 5 [LF]/.5ML; [LF]/.5ML; UG/.5ML; UG/.5ML; UG/.5ML; UG/.5ML
0.5 INJECTION, SUSPENSION INTRAMUSCULAR ONCE
Qty: 0.5 ML | Refills: 0 | Status: SHIPPED
Start: 2020-06-23 | End: 2020-06-23

## 2020-06-23 ASSESSMENT — FIBROSIS 4 INDEX: FIB4 SCORE: 1.66

## 2020-06-23 NOTE — ASSESSMENT & PLAN NOTE
New problem to examiner.  Chronic problem for the patient that is ongoing.  Reports that she has a lesion on her right inner leg near her ankle that has been present for years.  Recently, she noticed a similar lesion to the back of her left calf.  States that the lesion remains raised and dry, does not have pain or drainage.  Requesting referral to dermatology as she believes the lesion is suspicious and wants further work-up.

## 2020-06-23 NOTE — ASSESSMENT & PLAN NOTE
This is a new problem to the examiner.  Chronic, ongoing.   Last DEXA: 3/2018  Next DEXA due: 3/2020  Yes current or prior Rx. Takes vitamin D.   Continues to take fosamax 70 mg/week, denies side effects of the medication. Started medication 2018.  Reports she is currently supplementing vitamin D and calcium.  She reports she is engaging in minimal routine weightbearing exercise.  Denies recent falls or fractures.  Risk factors: neg for smoking, underweight, chronic prednisone or other high risk med.   Dental status: current with dental visits. Dental surgery is not currently planned.  Conditions affecting treatment: negative for: difficulty swallowing, CKD stage 4 or greater, hypocalcemia. She is able to sit or stand after taking medicine and understand directions for taking medicine.

## 2020-06-23 NOTE — ASSESSMENT & PLAN NOTE
This is a new problem to the examiner.  Chronic, stable.  Denies any muscle weakness.  No history of CKD.  Reports having a good diet, including dairy products.  No history of IBD's, celiac, CF, or surgeries causing malabsorption.  Patient is obese.  Is taking vitamin d and calcium supplement.   Due for updated lab work.   3/31/2017 09:58 3/23/2018 06:38   25-Hydroxy   Vitamin D 25 60 67

## 2020-06-23 NOTE — ASSESSMENT & PLAN NOTE
This is a new problem to the examiner.  Chronic problem for the patient over the last 4 years.  Reports that she takes over-the-counter Zyrtec daily during this time of the year.  Reports that itchy, watery, red eyes are her only symptoms.  She also uses over-the-counter antihistamine eyedrop, Ketotifen fumarate as needed.

## 2020-06-23 NOTE — ASSESSMENT & PLAN NOTE
This is a new problem to the examiner. Chronic problem for the patient that is ongoing. Reports that she experiences red rashes in between her breasts and under her pannus. She is prescribed lotrisone and nystatin powder for the rash. Reports that the nystatin powder works faster to resolve the rash, but does not last.

## 2020-06-23 NOTE — ASSESSMENT & PLAN NOTE
This is a new problem to the examiner.  Chronic, stable.    Currently taking levothyroxine 50 mcg daily as prescribed. TSH elevated in 2015, patient was also reporting fatigue and weight gain at that time. Started levothyroxine 8/2015.  Reports: fatigue.  Denies symptoms including mood changes, anxiety/depression, chest pain/pressure, palpitations, heat/cold intolerance, polydipsia, polyuria, diarrhea/constipation, abdominal pain, unexpected weight change, skin changes, hair thickening/coarsening/falling out/thinning, brittle nails, or edema.  Due for updated lab work.   4/4/2016 08:31 3/31/2017 09:58 9/22/2018 08:40   TSH 2.000 1.230 2.470   Free T-4 1.14 1.24 1.25

## 2020-06-23 NOTE — ASSESSMENT & PLAN NOTE
"This is a new problem to the examiner.  Chronic, ongoing.  Currently taking pravastatin 40 mg/day as directed.   Denies side effects of the medication--no myalgias or abdominal pain.  Reports diet is \"okay\".   She is not following a low-cholesterol diet.  She is not exercising regularly.  She is not taking ASA daily.  She denies dizziness, claudication, or chest pain.  Due for updated lab work.   3/31/2017 09:58 3/23/2018 06:38 9/22/2018 08:40 6/12/2019 09:38   Cholesterol,Tot 168 180 209 (H) 196   Triglycerides 148 122 155 (H) 159 (H)   HDL 45 56 63 60   LDL 93 100 (H) 115 (H) 104 (H)     "

## 2020-06-23 NOTE — PROGRESS NOTES
CC:   Chief Complaint   Patient presents with   • Establish Care     HISTORY OF THE PRESENT ILLNESS: Patient is a 70 y.o. female. This pleasant patient is here today to establish care and discuss multiple issues as listed below.    Health Maintenance: Completed    Annual Health Assessment Questions:    1.  Are you currently engaging in any exercise or physical activity? Yes    2.  How would you describe your mood or emotional well-being today? fair    3.  Have you had any falls in the last year? No    4.  Have you noticed any problems with your balance or had difficulty walking? No    5.  In the last six months have you experienced any leakage of urine? No    6. DPA/Advanced Directive: Patient has Advanced Directive on file.      Acquired hypothyroidism  This is a new problem to the examiner.  Chronic, stable.    Currently taking levothyroxine 50 mcg daily as prescribed. TSH elevated in 2015, patient was also reporting fatigue and weight gain at that time. Started levothyroxine 8/2015.  Reports: fatigue.  Denies symptoms including mood changes, anxiety/depression, chest pain/pressure, palpitations, heat/cold intolerance, polydipsia, polyuria, diarrhea/constipation, abdominal pain, unexpected weight change, skin changes, hair thickening/coarsening/falling out/thinning, brittle nails, or edema.  Due for updated lab work.   4/4/2016 08:31 3/31/2017 09:58 9/22/2018 08:40   TSH 2.000 1.230 2.470   Free T-4 1.14 1.24 1.25       Osteopenia  This is a new problem to the examiner.  Chronic, ongoing.   Last DEXA: 3/2018  Next DEXA due: 3/2020  Yes current or prior Rx. Takes vitamin D.   Continues to take fosamax 70 mg/week, denies side effects of the medication. Started medication 2018.  Reports she is currently supplementing vitamin D and calcium.  She reports she is engaging in minimal routine weightbearing exercise.  Denies recent falls or fractures.  Risk factors: neg for smoking, underweight, chronic prednisone or other  "high risk med.   Dental status: current with dental visits. Dental surgery is not currently planned.  Conditions affecting treatment: negative for: difficulty swallowing, CKD stage 4 or greater, hypocalcemia. She is able to sit or stand after taking medicine and understand directions for taking medicine.    Mixed hyperlipidemia  This is a new problem to the examiner.  Chronic, ongoing.  Currently taking pravastatin 40 mg/day as directed.   Denies side effects of the medication--no myalgias or abdominal pain.  Reports diet is \"okay\".   She is not following a low-cholesterol diet.  She is not exercising regularly.  She is not taking ASA daily.  She denies dizziness, claudication, or chest pain.  Due for updated lab work.   3/31/2017 09:58 3/23/2018 06:38 9/22/2018 08:40 6/12/2019 09:38   Cholesterol,Tot 168 180 209 (H) 196   Triglycerides 148 122 155 (H) 159 (H)   HDL 45 56 63 60   LDL 93 100 (H) 115 (H) 104 (H)       Vitamin D deficiency  This is a new problem to the examiner.  Chronic, stable.  Denies any muscle weakness.  No history of CKD.  Reports having a good diet, including dairy products.  No history of IBD's, celiac, CF, or surgeries causing malabsorption.  Patient is obese.  Is taking vitamin d and calcium supplement.   Due for updated lab work.   3/31/2017 09:58 3/23/2018 06:38   25-Hydroxy   Vitamin D 25 60 67       Obesity (BMI 30-39.9)  This is a new problem to the examiner.  Chronic, ongoing.   Diet: okay  Exercise: minimal  Vitals 7/17/2019 10/7/2019 2/7/2020 6/23/2020   WEIGHT 208 213 213 214   HEIGHT 5' 4\" 5' 4\" 5' 4\" 5' 4\"   BMI 35.7 kg/m2 36.56 kg/m2 36.56 kg/m2 36.73 kg/m2       Seasonal allergies  This is a new problem to the examiner.  Chronic problem for the patient over the last 4 years.  Reports that she takes over-the-counter Zyrtec daily during this time of the year.  Reports that itchy, watery, red eyes are her only symptoms.  She also uses over-the-counter antihistamine eyedrop, " Ketotifen fumarate as needed.    Rash  This is a new problem to the examiner. Chronic problem for the patient that is ongoing. Reports that she experiences red rashes in between her breasts and under her pannus. She is prescribed lotrisone and nystatin powder for the rash. Reports that the nystatin powder works faster to resolve the rash, but does not last.    Skin lesion of right leg  New problem to examiner.  Chronic problem for the patient that is ongoing.  Reports that she has a lesion on her right inner leg near her ankle that has been present for years.  Recently, she noticed a similar lesion to the back of her left calf.  States that the lesion remains raised and dry, does not have pain or drainage.  Requesting referral to dermatology as she believes the lesion is suspicious and wants further work-up.    Allergies: Meloxicam  Current Outpatient Medications Ordered in Epic   Medication Sig Dispense Refill   • tetanus-dipth-acell pertussis (ADACEL) 5-2-15.5 LF-MCG/0.5 Suspension 0.5 mL by Intramuscular route Once for 1 dose. 0.5 mL 0   • Zoster Vac Recomb Adjuvanted (SHINGRIX) 50 MCG/0.5ML Recon Susp 0.5 mL by Intramuscular route Once for 1 dose. 0.5 mL 0   • nystatin (MYCOSTATIN) powder Apply to affected area TID for 7 days 60 g 2   • clotrimazole-betamethasone (LOTRISONE) 1-0.05 % Cream Apply 1 Squirt to affected area(s) 2 times a day. 30 g 1   • pravastatin (PRAVACHOL) 40 MG tablet TAKE ONE TABLET BY MOUTH ONCE DAILY AT BEDTIME 100 Tab 2   • alendronate (FOSAMAX) 70 MG Tab TAKE 1 TABLET BY MOUTH ONCE A WEEK ON  AN  EMPTY  STOMACH  WITH  A  FULL  GLASS  OF  WATER.  REMAIN  UPRIGHT. 12 Tab 2   • triamcinolone (KENALOG) 0.1 % lotion APPLY TO SCALP AFTER SHAMPOOING WITH SELSUN BLUE SHAMPOO 60 mL 2   • piroxicam (FELDENE) 10 MG Cap Take 1 Cap by mouth 2 times a day, with meals. 180 Cap 3   • tizanidine (ZANAFLEX) 4 MG Tab Take 1 Tab by mouth every 6 hours as needed. For muscle spasm 30 Tab 3   • cetirizine  (ZYRTEC) 10 MG Tab Take 10 mg by mouth every day.     • Salicylic Acid (SELSUN BLUE DEEP CLEANSING) 3 % Shampoo Shampoo hair with this every other day 1 Bottle 3   • vitamin D3, cholecalciferol, 1000 UNIT Tab Take 1 Tab by mouth every day. 100 Tab 3   • acetaminophen (TYLENOL) 500 MG Tab Take 500-1,000 mg by mouth 3 times a day.     • ascorbic acid (ASCORBIC ACID) 500 MG Tab Take 1,000 mg by mouth every day.     • BETA CAROTENE PO Take 10,000 Int'l Units by mouth every day.     • B Complex-C-Folic Acid (STRESS B COMPLEX PO) Take  by mouth 2 Times a Day.     • Non Formulary Request 2 Times a Day. Zyflamend organic anti-inflammatory     • Red Yeast Rice Extract (RED YEAST RICE PO) Take  by mouth 2 Times a Day.     • Calcium 1500 MG TABS Take  by mouth every day.     • Magnesium 500 MG CAPS Take 875 mg by mouth every evening.     • Potassium (POTASSIMIN PO) Take 45 mg by mouth every day.       No current [facility]-ordered facility-administered medications on file.      Past Medical History:   Diagnosis Date   • Arthritis     to joints   • Disorder of thyroid     hypoactive   • Family history of diabetes mellitus 2/17/2015   • High cholesterol    • Hyperlipidemia    • Pain 4/22/16    low back-chronic   • Pain 1/27/17    right knee   • Personal history of colonic polyps 2/12/2020    Goes to [employer].   • Screening mammogram for high-risk patient 10/7/2019     Past Surgical History:   Procedure Laterality Date   • KNEE ARTHROSCOPY Right 1/30/2017    Procedure: KNEE ARTHROSCOPY;  Surgeon: Will Mattson M.D.;  Location: [facility];  Service:    • MEDIAL MENISCECTOMY  1/30/2017    Procedure: MEDIAL MENISCECTOMY - PARTIAL;  Surgeon: Will Mattson M.D.;  Location: [facility];  Service:    • TRIGGER FINGER RELEASE Left 4/27/2016    Procedure: TRIGGER FINGER RELEASE - THUMB;  Surgeon: Bhavin Barker M.D.;  Location: [facility];  Service:    • DEQUERVAINS RELEASE Right  3/2015    wrist   • COLONOSCOPY  2009   • SHOULDER SURGERY Left 2008    removal of bone spur left shoulder     Social History     Tobacco Use   • Smoking status: Never Smoker   • Smokeless tobacco: Never Used   Substance Use Topics   • Alcohol use: No   • Drug use: No     Social History     Social History Narrative   • Not on file     Family History   Problem Relation Age of Onset   • Stroke Father    • Diabetes Father    • Asthma Mother    • Heart Attack Mother 55   • Obesity Mother    • No Known Problems Brother    • No Known Problems Maternal Grandmother    • Cancer Maternal Grandfather    • No Known Problems Paternal Grandmother    • No Known Problems Paternal Grandfather    • Cancer Brother         throat cancer     ROS:   Constitutional:  Negative for fever, chills, unexpected weight change, night sweats, body aches, sleep issues, and fatigue/generalized weakness.   HEENT:  Negative for headaches, vision changes, hearing changes, ear pain, tinnitus, ear discharge, rhinorrhea, sinus congestion, sneezing, sore throat, and neck pain.    Respiratory:  Negative for cough, shortness of breath, sputum production, hemoptysis, chest congestion, dyspnea, wheezing, and crackles.    Cardiovascular:  Negative for chest pain, palpitations, LOZANO, paroxsymal nocturnal dyspnea, orthopnea, and bilateral lower extremity edema.   Gastrointestinal:  Negative for heartburn, nausea, vomiting, abdominal pain, hematochezia, melena, diarrhea, constipation, and greasy/foul-smelling stools.   Genitourinary:  Negative for dysuria, nocturia, polyuria, hematuria, pyuria, urinary urgency, urinary frequency, and urinary incontinence.   Musculoskeletal: Positive for chronic back pain.  Negative for myalgias and joint pain.   Skin: Positive for rash on neck, under bilateral breasts, under pannus. Negative for sores, lumps, itching, cyanotic skin color change.   Neurological:  Negative for dizziness, tingling, tremors, focal sensory deficit,  "focal weakness and headaches.   Endo/Heme/Allergies: Positive for seasonal allergies.  Does not bruise/bleed easily. Denies cold/heat intolerance.   Psychiatric/Behavioral: Negative for depression, suicidal/homicidal ideation and memory loss.        Exam: /70 (BP Location: Left arm, Patient Position: Sitting, BP Cuff Size: Large adult)   Pulse 73   Temp 35.9 °C (96.7 °F) (Temporal)   Resp 16   Ht 1.626 m (5' 4\")   Wt 97.1 kg (214 lb)   SpO2 95%  Body mass index is 36.73 kg/m².    General:  Normal appearing. No distress. Eye contact: good .  HEENT:  Normocephalic. Eyes conjunctiva clear lids without ptosis, pupils equal and reactive to light accommodation, ears normal shape and contour, canals are clear bilaterally, tympanic membranes are benign, nasal mucosa benign, oropharynx is without erythema, edema or exudates. Sinuses (frontal and maxillary) non tender to palpation.  Neck:  Supple without JVD or bruit. Thyroid is not enlarged.  Pulmonary:  Clear to ausculation.  Normal effort. No rales, rhonchi, or wheezing.  Cardiovascular:  Regular rate and rhythm without murmur. Carotid and radial pulses are intact and equal bilaterally.  Abdomen:  Soft, non tender, non distended. Normal bowel sounds.  Neurologic:  Grossly non focal.  Lymph:  No cervical, supraclavicular lymph nodes are palpable.  Skin: Erythematous plaques on the right inner ankle and posterior left calf. Warm and dry.  Musculoskeletal:  Normal gait. No extremity cyanosis, clubbing, or edema.  Psych:  Mood and Affect normal. Alert and oriented x3. Judgment and insight is normal.    Assessment/Plan:  1. Establishing care with new doctor, encounter for    2. Rash  3. Skin lesion of right leg  Chronic, ongoing.  Continue using previously prescribe creams and powders as needed.  Referral to dermatology for further evaluation of lesion on right leg and left leg.  - REFERRAL TO DERMATOLOGY    4. Mixed hyperlipidemia  Chronic, ongoing.   Continue " pravastatin 40 mg/day.   Reviewed the risks and benefits of treatment and potential side effects of medication.  Encouraged diet high in fruits, vegetables, and fiber. And a diet low in salt, refined carbohydrates, cholesterol, saturated fat, and trans fatty acids.    Encouraged a minimum of 30 minutes of moderate intensity aerobic exercise (eg, brisk walking) is recommended on five days each week. Or 30 minutes of vigorous-intensity aerobic exercise (eg, jogging) on three days each week.   Due for updated lab work.  - Comp Metabolic Panel; Future  - Lipid Profile; Future    5. Acquired hypothyroidism  Chronic, ongoing.  Continue levothyroxine 50 mcg/day.  Will send refill as soon as lab results are received.  Due for updated lab work.  - TSH; Future  - FREE THYROXINE; Future  - T3 FREE; Future    6. Obesity (BMI 30-39.9)  Chronic, ongoing.  Encouraged diet high in fruits, vegetables, and fiber. And a diet low in salt, refined carbohydrates, cholesterol, saturated fat, and trans fatty acids.    Encouraged a minimum of 30 minutes of moderate intensity aerobic exercise (eg, brisk walking) is recommended on five days each week. Or 30 minutes of vigorous-intensity aerobic exercise (eg, jogging) on three days each week.   Patient's body mass index is 36.73 kg/m². Exercise and nutrition counseling were performed at this visit.  - CBC WITH DIFFERENTIAL; Future  - Comp Metabolic Panel; Future  - Lipid Profile; Future    7. Osteopenia, unspecified location  8. Postmenopausal  Chronic, ongoing.  Continue Fosamax 70 mg 1 time per week, does not need a refill at this time.  Patient started medication 2 and half years ago (early 2018).  Due for updated bone density scan.  - DS-BONE DENSITY STUDY (DEXA); Future    9. Vitamin D deficiency  Chronic, ongoing.  Continue over-the-counter vitamin D and calcium supplement daily.  Due for updated lab work.  - VITAMIN D,25 HYDROXY; Future    10. Seasonal allergies  Chronic,  ongoing.  Continue over-the-counter antihistamine eyedrops as needed and Zyrtec as needed.    11. Need for hepatitis C screening test  12. Other problems related to lifestyle  - HCV Scrn ( [id number] 1xLife); Future    13. Need for vaccination  - tetanus-dipth-acell pertussis (ADACEL) 5-2-15.5 LF-MCG/0.5 Suspension; 0.5 mL by Intramuscular route Once for 1 dose.  Dispense: 0.5 mL; Refill: 0  - Zoster Vac Recomb Adjuvanted (SHINGRIX) 50 MCG/0.5ML Recon Susp; 0.5 mL by Intramuscular route Once for 1 dose.  Dispense: 0.5 mL; Refill: 0    Patient was seen for at least 40 minutes total face-to-face time with greater than 50% of that time spent on counseling and care coordination.    Educated in proper administration of medication(s) ordered today including safety, possible SE, risks, benefits, rationale and alternatives to therapy.   Supportive care, differential diagnoses, and indications for immediate follow-up discussed with patient.    Pathogenesis of diagnosis discussed including typical length and natural progression.    Instructed to return to clinic or nearest emergency department for any change in condition, further concerns, or worsening of symptoms.  Patient states understanding of the plan of care and discharge instructions.    Return for HC/PCP Annual Wellness Visit- [facility], As needed.    Please note that this dictation was created using voice recognition software. I have made every reasonable attempt to correct obvious errors, but I expect that there are errors of grammar and possibly content that I did not discover before finalizing the note.

## 2020-06-23 NOTE — ASSESSMENT & PLAN NOTE
"This is a new problem to the examiner.  Chronic, ongoing.   Diet: okay  Exercise: minimal  Vitals 7/17/2019 10/7/2019 2/7/2020 6/23/2020   WEIGHT 208 213 213 214   HEIGHT 5' 4\" 5' 4\" 5' 4\" 5' 4\"   BMI 35.7 kg/m2 36.56 kg/m2 36.56 kg/m2 36.73 kg/m2     "

## 2020-06-24 DIAGNOSIS — E03.8 OTHER SPECIFIED HYPOTHYROIDISM: ICD-10-CM

## 2020-06-24 DIAGNOSIS — E03.9 ACQUIRED HYPOTHYROIDISM: ICD-10-CM

## 2020-06-24 RX ORDER — LEVOTHYROXINE SODIUM 0.05 MG/1
50 TABLET ORAL
Qty: 90 TAB | Refills: 3 | Status: SHIPPED | OUTPATIENT
Start: 2020-06-24 | End: 2021-07-11

## 2020-06-24 NOTE — PROGRESS NOTES
Requested Prescriptions     Signed Prescriptions Disp Refills   • levothyroxine (SYNTHROID) 50 MCG Tab 90 Tab 3     Sig: Take 1 Tab by mouth Every morning on an empty stomach.       SHYANN Hdez.

## 2020-08-03 ENCOUNTER — OFFICE VISIT (OUTPATIENT)
Dept: MEDICAL GROUP | Facility: PHYSICIAN GROUP | Age: 70
End: 2020-08-03
Payer: MEDICARE

## 2020-08-03 VITALS
OXYGEN SATURATION: 99 % | HEIGHT: 64 IN | TEMPERATURE: 97.8 F | DIASTOLIC BLOOD PRESSURE: 84 MMHG | SYSTOLIC BLOOD PRESSURE: 118 MMHG | BODY MASS INDEX: 36.19 KG/M2 | RESPIRATION RATE: 16 BRPM | HEART RATE: 64 BPM | WEIGHT: 212 LBS

## 2020-08-03 DIAGNOSIS — M54.42 CHRONIC LEFT-SIDED LOW BACK PAIN WITH LEFT-SIDED SCIATICA: ICD-10-CM

## 2020-08-03 DIAGNOSIS — R93.7 ABNORMAL MRI, LUMBAR SPINE: ICD-10-CM

## 2020-08-03 DIAGNOSIS — G89.29 CHRONIC LEFT-SIDED LOW BACK PAIN WITH LEFT-SIDED SCIATICA: ICD-10-CM

## 2020-08-03 PROCEDURE — 99214 OFFICE O/P EST MOD 30 MIN: CPT | Performed by: NURSE PRACTITIONER

## 2020-08-03 RX ORDER — PREDNISONE 10 MG/1
TABLET ORAL
Qty: 35 EACH | Refills: 1 | OUTPATIENT
Start: 2020-08-03

## 2020-08-03 RX ORDER — TIZANIDINE 4 MG/1
4 TABLET ORAL EVERY 6 HOURS PRN
Qty: 30 TAB | Refills: 3 | Status: SHIPPED | OUTPATIENT
Start: 2020-08-03 | End: 2022-04-11 | Stop reason: SDUPTHER

## 2020-08-03 RX ORDER — HYDROCODONE BITARTRATE AND ACETAMINOPHEN 5; 325 MG/1; MG/1
1 TABLET ORAL EVERY 4 HOURS PRN
COMMUNITY
End: 2020-08-17

## 2020-08-03 RX ORDER — PREDNISONE 20 MG/1
60 TABLET ORAL DAILY
Qty: 15 TAB | Refills: 0 | Status: SHIPPED | OUTPATIENT
Start: 2020-08-03 | End: 2020-08-17

## 2020-08-03 RX ORDER — PREDNISONE 10 MG/1
TABLET ORAL
Qty: 35 TAB | Refills: 1 | Status: SHIPPED | OUTPATIENT
Start: 2020-08-03 | End: 2020-08-17

## 2020-08-03 ASSESSMENT — FIBROSIS 4 INDEX: FIB4 SCORE: 1.61

## 2020-08-03 NOTE — TELEPHONE ENCOUNTER
Received request via: Pharmacy    Was the patient seen in the last year in this department? Yes    Does the patient have an active prescription (recently filled or refills available) for medication(s) requested? YES PHARMACY NEEDS CLARIFICATION ON DIRECTIONS

## 2020-08-03 NOTE — ASSESSMENT & PLAN NOTE
New problem to me.  Chronic ongoing.  Taking zanafelx.  Both her back and now her left upper thigh is hurting.  No numbness or tingling.  Pain in left leg is burning in character.  Is using ice every hour.  Seeing chiropractor and stretching.    In the past she has had this occur.  Prednisone has helped in the past. Has been taking an old prescription of Norco along with the zanaflex. She is also on daily antiinflammatory, feldene.  Discussed GI concerns.

## 2020-08-03 NOTE — PROGRESS NOTES
Chief Complaint   Patient presents with   • Back Pain   • Leg Pain     left leg        Subjective:   Deneen Temple is a 70 y.o. female of [place] here today for evaluation and management of:    Chronic left-sided low back pain with left-sided sciatica  New problem to me.  Chronic ongoing.  Taking zanafelx.  Both her back and now her left upper thigh is hurting.  No numbness or tingling.  Pain in left leg is burning in character.  Is using ice every hour.  Seeing chiropractor and stretching.    In the past she has had this occur.  Prednisone has helped in the past. Has been taking an old prescription of Norco along with the zanaflex. She is also on daily antiinflammatory, feldene.  Discussed GI concerns.          Current medicines (including changes today)  Current Outpatient Medications   Medication Sig Dispense Refill   • HYDROcodone-acetaminophen (NORCO) 5-325 MG Tab per tablet Take 1 Tab by mouth every four hours as needed.     • predniSONE (DELTASONE) 10 MG Tab Take pred pack as directed. 35 Tab 1   • tizanidine (ZANAFLEX) 4 MG Tab Take 1 Tab by mouth every 6 hours as needed. For muscle spasm 30 Tab 3   • levothyroxine (SYNTHROID) 50 MCG Tab Take 1 Tab by mouth Every morning on an empty stomach. 90 Tab 3   • nystatin (MYCOSTATIN) powder Apply to affected area TID for 7 days 60 g 2   • clotrimazole-betamethasone (LOTRISONE) 1-0.05 % Cream Apply 1 Squirt to affected area(s) 2 times a day. 30 g 1   • pravastatin (PRAVACHOL) 40 MG tablet TAKE ONE TABLET BY MOUTH ONCE DAILY AT BEDTIME 100 Tab 2   • alendronate (FOSAMAX) 70 MG Tab TAKE 1 TABLET BY MOUTH ONCE A WEEK ON  AN  EMPTY  STOMACH  WITH  A  FULL  GLASS  OF  WATER.  REMAIN  UPRIGHT. 12 Tab 2   • triamcinolone (KENALOG) 0.1 % lotion APPLY TO SCALP AFTER SHAMPOOING WITH SELSUN BLUE SHAMPOO 60 mL 2   • piroxicam (FELDENE) 10 MG Cap Take 1 Cap by mouth 2 times a day, with meals. 180 Cap 3   • cetirizine (ZYRTEC) 10 MG Tab Take 10 mg by mouth  "every day.     • Salicylic Acid (SELSUN BLUE DEEP CLEANSING) 3 % Shampoo Shampoo hair with this every other day 1 Bottle 3   • vitamin D3, cholecalciferol, 1000 UNIT Tab Take 1 Tab by mouth every day. 100 Tab 3   • ascorbic acid (ASCORBIC ACID) 500 MG Tab Take 1,000 mg by mouth every day.     • BETA CAROTENE PO Take 10,000 Int'l Units by mouth every day.     • B Complex-C-Folic Acid (STRESS B COMPLEX PO) Take  by mouth 2 Times a Day.     • Non Formulary Request 2 Times a Day. Zyflamend organic anti-inflammatory     • Calcium 1500 MG TABS Take  by mouth every day.     • Magnesium 500 MG CAPS Take 875 mg by mouth every evening.     • Potassium (POTASSIMIN PO) Take 45 mg by mouth every day.     • acetaminophen (TYLENOL) 500 MG Tab Take 500-1,000 mg by mouth 3 times a day.     • Red Yeast Rice Extract (RED YEAST RICE PO) Take  by mouth 2 Times a Day.       No current facility-administered medications for this visit.      She  has a past medical history of Arthritis, Disorder of thyroid, Family history of diabetes mellitus (2/17/2015), High cholesterol, Hyperlipidemia, Pain (4/22/16), Pain (1/27/17), Personal history of colonic polyps (2/12/2020), and Screening mammogram for high-risk patient (10/7/2019).    ROS as stated in hpi  No chest pain, no shortness of breath, no abdominal pain       Objective:     /84 (BP Location: Left arm, Patient Position: Sitting)   Pulse 64   Temp 36.6 °C (97.8 °F) (Temporal)   Resp 16   Ht 1.626 m (5' 4\")   Wt 96.2 kg (212 lb)   SpO2 99%  Body mass index is 36.39 kg/m².   Physical Exam:  Constitutional: Alert, no distress.  Skin: Warm, dry, good turgor,no cyanosis, no rashes in visible areas.  Eye: Equal, round and reactive, conjunctiva clear, lids normal.  Ears: No tenderness, no discharge.  External canals are without any significant edema or erythema.    Gross auditory acuity is intact.  Nose: symmetrical without tenderness, no discharge.  Mouth/Throat: lips without lesion.  " Oropharynx clear.  Neck: Trachea midline, no masses, no obvious thyroid enlargement..  Range of motion within normal limits.  Neuro: Cranial nerves 2-12 grossly intact.  No sensory deficit.  Respiratory: Unlabored respiratory effort  Cardiovascular: Normal S1, S2, no murmur, no edema.  MSK:  Laying down, unable to stand without assistance.  Negative leg raise.  Past Xrays and MRI reviewed  Psych: Alert and oriented x3, normal affect and mood and judgement.        Assessment and Plan:   The following treatment plan was discussed    1. Abnormal MRI, lumbar spine  New problem to me.  Chronic, ongoing.  MRI in 2018 with mild disc bulging.  Acute flare at this time.  Refill provided.  Referral to physiatry  - tizanidine (ZANAFLEX) 4 MG Tab; Take 1 Tab by mouth every 6 hours as needed. For muscle spasm  Dispense: 30 Tab; Refill: 3    2. Chronic left-sided low back pain with left-sided sciatica  New problem to me.  Chronic, ongoing, worsening with flare today.  Pred pack sent to pharmacy.  Encouraged ice, chiropractor.  Referral to physiatry.  Continue stretching.    - REFERRAL TO PHYSIATRY (PMR)      Followup: Return if symptoms worsen or fail to improve.         Educated in proper administration of medication(s) ordered today including safety, possible SE, risks, benefits, rationale and alternatives to therapy.     Please note that this dictation was created using voice recognition software. I have made every reasonable attempt to correct obvious errors, but I expect that there are errors of grammar and possibly content that I did not discover before finalizing the note.

## 2020-08-03 NOTE — TELEPHONE ENCOUNTER
Requested Prescriptions     Signed Prescriptions Disp Refills   • predniSONE (DELTASONE) 20 MG Tab 15 Tab 0     Sig: Take 3 Tabs by mouth every day.     Authorizing Provider: FLOR POP     Refused Prescriptions Disp Refills   • predniSONE (DELTASONE) 10 MG Tab [Pharmacy Med Name: PREDNISONE 10MG     TAB] 35 Each 1     Sig: TAKE PRE PACK AS DIRECTED     Refused By: FLOR POP     Reason for Refusal: Request already responded to by other means (e.g. phone or fax)     I ordered the prednisone the way I would prescribe:  20 mg tid X 5 days  SHYANN Ramos.

## 2020-08-10 ENCOUNTER — PATIENT MESSAGE (OUTPATIENT)
Dept: MEDICAL GROUP | Facility: PHYSICIAN GROUP | Age: 70
End: 2020-08-10

## 2020-08-13 ENCOUNTER — PATIENT OUTREACH (OUTPATIENT)
Dept: HEALTH INFORMATION MANAGEMENT | Facility: OTHER | Age: 70
End: 2020-08-13

## 2020-08-13 NOTE — PROGRESS NOTES
Patient called in regarding her referral to [facility]. There have been a few issues getting her referral to the office. I contacted [facility] today and found they did receive the referral on 8-. It is currently being reviewed by the Pain Management provider for scheduling. I left a message with the  to contact me in regards to the process and when the patient may be scheduled. I contacted patient and  to let her know of my progress.

## 2020-08-17 ENCOUNTER — OFFICE VISIT (OUTPATIENT)
Dept: MEDICAL GROUP | Facility: PHYSICIAN GROUP | Age: 70
End: 2020-08-17
Payer: MEDICARE

## 2020-08-17 DIAGNOSIS — G89.29 CHRONIC LEFT-SIDED LOW BACK PAIN WITH LEFT-SIDED SCIATICA: ICD-10-CM

## 2020-08-17 DIAGNOSIS — M54.42 CHRONIC LEFT-SIDED LOW BACK PAIN WITH LEFT-SIDED SCIATICA: ICD-10-CM

## 2020-08-17 DIAGNOSIS — M47.896 OTHER OSTEOARTHRITIS OF SPINE, LUMBAR REGION: ICD-10-CM

## 2020-08-17 PROCEDURE — 99214 OFFICE O/P EST MOD 30 MIN: CPT | Performed by: NURSE PRACTITIONER

## 2020-08-17 RX ORDER — PREDNISONE 10 MG/1
TABLET ORAL
Qty: 35 TAB | Refills: 1 | Status: ON HOLD | OUTPATIENT
Start: 2020-08-17 | End: 2020-12-16

## 2020-08-17 RX ORDER — HYDROCODONE BITARTRATE AND ACETAMINOPHEN 10; 325 MG/1; MG/1
.5-1 TABLET ORAL EVERY 8 HOURS PRN
Qty: 42 TAB | Refills: 0 | Status: SHIPPED | OUTPATIENT
Start: 2020-08-17 | End: 2020-08-19 | Stop reason: SDUPTHER

## 2020-08-17 ASSESSMENT — FIBROSIS 4 INDEX: FIB4 SCORE: 1.61

## 2020-08-17 NOTE — ASSESSMENT & PLAN NOTE
New problem to examiner.  Chronic problem for the patient that has been intermittent for at least 25 years.  Patient reports that when she has a flare, she will have immediate pain in her back upon standing up an improvement in a supine position.  Reports that when she has a flare of the muscle spasms and back pain, she takes previously prescribed Zanaflex as well as an old prescription of Norco that is usually effective. She has had a lumbar spine MRI completed in August 2018 which did show bulging disc at that time.  She was referred to neurosurgery, but did not follow through with the referral as her symptoms had improved.  She was recently seen in clinic on 8/3/2020 with a flare of her back pain/spasms as well as left upper thigh pain.  Patient states that she does have intermittent left outer hip area pain that is not sciatica pain.  Reports that the pain in the left leg is 4-5/10 and constant if upright.  Reports that she has had this flare every July since 2018.  She had been seen by previous PCP in the past for this issue in 2018 and 2019 and was prescribed a prednisone taper that was extremely effective.  In 2019, she was prescribed a Medrol Dosepak that did not help symptoms at all and she ultimately ended up taking prednisone which resolved symptoms.  Reports that when she takes her hydrocodone for her back pain, she is prescribed the  mg tablets and she cuts the tablet into fourths.  Usually, the combination of a quarter hydrocodone  mg tablet, tizanidine, and ice every hour usually resolve symptoms after 4 days of treatment.  By the time she was seen in clinic on 8/3/2020, symptoms were out of control and she was taking hydrocodone  mg tablets 3 times a day in addition to tizanidine and ice without any relief.  When she was seen on 8/3/2020, she was given a prescription for prednisone that was not the same prescription as the previous taper that had been so effective and she was also  referred to [facility].  Reports that the delay in receiving her medication as well as a delay in the referral to [facility] made recovery time of current flare extended.  Reports that she was very frustrated with difficulty in getting proper medication that has been very effective for her symptoms in the past as well as a timely referral.  She is scheduled to see a provider at [facility] on Friday, 8/21/2020.  The patient was prescribed hydrocodone by her last PCP in 2017 and she has 1 tab from this prescription remaining.  She is requesting a prescription for hydrocodone and prednisone to have on hand for a flare so that it does not become severe and unresponsive to all treatment in the future.

## 2020-08-19 VITALS
OXYGEN SATURATION: 97 % | RESPIRATION RATE: 16 BRPM | WEIGHT: 211 LBS | TEMPERATURE: 97.6 F | HEART RATE: 74 BPM | HEIGHT: 64 IN | SYSTOLIC BLOOD PRESSURE: 112 MMHG | BODY MASS INDEX: 36.02 KG/M2 | DIASTOLIC BLOOD PRESSURE: 64 MMHG

## 2020-08-19 DIAGNOSIS — M54.42 CHRONIC LEFT-SIDED LOW BACK PAIN WITH LEFT-SIDED SCIATICA: ICD-10-CM

## 2020-08-19 DIAGNOSIS — G89.29 CHRONIC LEFT-SIDED LOW BACK PAIN WITH LEFT-SIDED SCIATICA: ICD-10-CM

## 2020-08-19 RX ORDER — HYDROCODONE BITARTRATE AND ACETAMINOPHEN 10; 325 MG/1; MG/1
.5-1 TABLET ORAL EVERY 8 HOURS PRN
Qty: 21 TAB | Refills: 0 | Status: SHIPPED | OUTPATIENT
Start: 2020-08-19 | End: 2020-09-02 | Stop reason: SDUPTHER

## 2020-08-20 NOTE — PROGRESS NOTES
CC:   Chief Complaint   Patient presents with   • Back Pain     feeling better     HISTORY OF THE PRESENT ILLNESS: Patient is a 70 y.o. female. This pleasant patient is here today to follow up after recent severe back pain/spasm flare.    Health Maintenance: Completed    Chronic left-sided low back pain with left-sided sciatica  New problem to examiner.  Chronic problem for the patient that has been intermittent for at least 25 years.  Patient reports that when she has a flare, she will have immediate pain in her back upon standing up an improvement in a supine position.  Reports that when she has a flare of the muscle spasms and back pain, she takes previously prescribed Zanaflex as well as an old prescription of Norco that is usually effective. She has had a lumbar spine MRI completed in August 2018 which did show bulging disc at that time.  She was referred to neurosurgery, but did not follow through with the referral as her symptoms had improved.  She was recently seen in clinic on 8/3/2020 with a flare of her back pain/spasms as well as left upper thigh pain.  Patient states that she does have intermittent left outer hip area pain that is not sciatica pain.  Reports that the pain in the left leg is 4-5/10 and constant if upright.  Reports that she has had this flare every July since 2018.  She had been seen by previous PCP in the past for this issue in 2018 and 2019 and was prescribed a prednisone taper that was extremely effective.  In 2019, she was prescribed a Medrol Dosepak that did not help symptoms at all and she ultimately ended up taking prednisone which resolved symptoms.  Reports that when she takes her hydrocodone for her back pain, she is prescribed the  mg tablets and she cuts the tablet into fourths.  Usually, the combination of a quarter hydrocodone  mg tablet, tizanidine, and ice every hour usually resolve symptoms after 4 days of treatment.  By the time she was seen in clinic on  8/3/2020, symptoms were out of control and she was taking hydrocodone  mg tablets 3 times a day in addition to tizanidine and ice without any relief.  When she was seen on 8/3/2020, she was given a prescription for prednisone that was not the same prescription as the previous taper that had been so effective and she was also referred to [facility].  Reports that the delay in receiving her medication as well as a delay in the referral to [facility] made recovery time of current flare extended.  Reports that she was very frustrated with difficulty in getting proper medication that has been very effective for her symptoms in the past as well as a timely referral.  She is scheduled to see a provider at [facility] on Friday, 8/21/2020.  The patient was prescribed hydrocodone by her last PCP in 2017 and she has 1 tab from this prescription remaining.  She is requesting a prescription for hydrocodone and prednisone to have on hand for a flare so that it does not become severe and unresponsive to all treatment in the future.    Allergies: Meloxicam  Current Outpatient Medications Ordered in Epic   Medication Sig Dispense Refill   • predniSONE (DELTASONE) 10 MG Tab Take with food. Take 6 pills on day 1, then 5 pills on days 2 and 3, then 4 pills on days 4 and 5, then 3 pills on days 6 and 7, then 2 pills on days 8 and 9, then 1 pill on day 10, then stop. 35 Tab 1   • tizanidine (ZANAFLEX) 4 MG Tab Take 1 Tab by mouth every 6 hours as needed. For muscle spasm 30 Tab 3   • levothyroxine (SYNTHROID) 50 MCG Tab Take 1 Tab by mouth Every morning on an empty stomach. 90 Tab 3   • nystatin (MYCOSTATIN) powder Apply to affected area TID for 7 days 60 g 2   • clotrimazole-betamethasone (LOTRISONE) 1-0.05 % Cream Apply 1 Squirt to affected area(s) 2 times a day. 30 g 1   • pravastatin (PRAVACHOL) 40 MG tablet TAKE ONE TABLET BY MOUTH ONCE DAILY AT BEDTIME 100 Tab 2   • alendronate (FOSAMAX) 70 MG Tab TAKE 1 TABLET BY MOUTH  ONCE A WEEK ON  AN  EMPTY  STOMACH  WITH  A  FULL  GLASS  OF  WATER.  REMAIN  UPRIGHT. 12 Tab 2   • triamcinolone (KENALOG) 0.1 % lotion APPLY TO SCALP AFTER SHAMPOOING WITH SELSUN BLUE SHAMPOO 60 mL 2   • piroxicam (FELDENE) 10 MG Cap Take 1 Cap by mouth 2 times a day, with meals. 180 Cap 3   • cetirizine (ZYRTEC) 10 MG Tab Take 10 mg by mouth every day.     • Salicylic Acid (SELSUN BLUE DEEP CLEANSING) 3 % Shampoo Shampoo hair with this every other day 1 Bottle 3   • vitamin D3, cholecalciferol, 1000 UNIT Tab Take 1 Tab by mouth every day. 100 Tab 3   • acetaminophen (TYLENOL) 500 MG Tab Take 500-1,000 mg by mouth 3 times a day.     • ascorbic acid (ASCORBIC ACID) 500 MG Tab Take 1,000 mg by mouth every day.     • BETA CAROTENE PO Take 10,000 Int'l Units by mouth every day.     • B Complex-C-Folic Acid (STRESS B COMPLEX PO) Take  by mouth 2 Times a Day.     • Non Formulary Request 2 Times a Day. Zyflamend organic anti-inflammatory     • Red Yeast Rice Extract (RED YEAST RICE PO) Take  by mouth 2 Times a Day.     • Calcium 1500 MG TABS Take  by mouth every day.     • Magnesium 500 MG CAPS Take 875 mg by mouth every evening.     • Potassium (POTASSIMIN PO) Take 45 mg by mouth every day.     • HYDROcodone/acetaminophen (NORCO)  MG Tab Take 0.5-1 Tabs by mouth every 8 hours as needed for Severe Pain for up to 7 days. 21 Tab 0     No current Epic-ordered facility-administered medications on file.      Past Medical History:   Diagnosis Date   • Arthritis     to joints   • Disorder of thyroid     hypoactive   • Family history of diabetes mellitus 2/17/2015   • High cholesterol    • Hyperlipidemia    • Pain 4/22/16    low back-chronic   • Pain 1/27/17    right knee   • Personal history of colonic polyps 2/12/2020    [facility]   • Screening mammogram for high-risk patient 10/7/2019     Past Surgical History:   Procedure Laterality Date   • KNEE ARTHROSCOPY Right 1/30/2017    Procedure: KNEE  "ARTHROSCOPY;  Surgeon: Will Mattson M.D.;  Location: SURGERY [facility];  Service:    • MEDIAL MENISCECTOMY  1/30/2017    Procedure: MEDIAL MENISCECTOMY - PARTIAL;  Surgeon: Will Mattson M.D.;  Location: SURGERY [facility];  Service:    • TRIGGER FINGER RELEASE Left 4/27/2016    Procedure: TRIGGER FINGER RELEASE - THUMB;  Surgeon: Bhavin Barker M.D.;  Location: SURGERY [facility];  Service:    • DEQUERVAINS RELEASE Right 3/2015    wrist   • COLONOSCOPY  2009   • SHOULDER SURGERY Left 2008    removal of bone spur left shoulder     Social History     Tobacco Use   • Smoking status: Never Smoker   • Smokeless tobacco: Never Used   Substance Use Topics   • Alcohol use: No   • Drug use: No     Social History     Social History Narrative   • Not on file     Family History   Problem Relation Age of Onset   • Stroke Father    • Diabetes Father    • Asthma Mother    • Heart Attack Mother 55   • Obesity Mother    • No Known Problems Brother    • No Known Problems Maternal Grandmother    • Cancer Maternal Grandfather    • No Known Problems Paternal Grandmother    • No Known Problems Paternal Grandfather    • Cancer Brother         throat cancer     ROS:   Constitutional: No fevers, chills, malaise/fatigue.  Eyes: No eye pain.  ENT: No sore throat, congestion.   Resp: No cough, shortness of breath.  CV: No chest pain, leg swelling, palpitations.  GI: No nausea/vomiting, abdominal pain, constipation, diarrhea.  : No dysuria, hematuria.  MSK: +improving back pain. No weakness.  Skin: No rashes.  Neuro: No dizziness, weakness, headaches.  Psych: No suicidal ideations.    All remaining systems reviewed and found to be negative, except as stated above.       Exam: /64 (BP Location: Left arm, Patient Position: Sitting, BP Cuff Size: Adult)   Pulse 74   Temp 36.4 °C (97.6 °F) (Temporal)   Resp 16   Ht 1.626 m (5' 4\")   Wt 95.7 kg (211 lb)   SpO2 97%  Body mass index is 36.22 " kg/m².    General: Well nourished, well developed female in NAD, awake and conversant.  Eyes: Normal conjunctiva, anicteric.  Round symmetrical pupils.  ENT: Hearing grossly intact.  No nasal discharge.  Neck: Neck is supple.  No masses or thyromegaly.  CV: No lower extremity edema.  Respiratory: Respirations are nonlabored.  No wheezing.  Abdomen: Non-Distended.  Skin: Warm.  No rashes or ulcers.  MSK: Normal ambulation.  No clubbing or cyanosis.  Neuro: Sensation and CN II-XII grossly normal.  Psych: Alert and oriented.  Cooperative, appropriate mood and affect, normal judgment.     Assessment/Plan:  1. Chronic left-sided low back pain with left-sided sciatica  2. Other osteoarthritis of spine, lumbar region  New problem to examiner.  Chronic problem for the patient that is improving.  Previous prednisone taper that was effective prescribed to be used as needed for severe flare.  Keep appointment with [facility] on Friday, 8/21/2020.  7-day prescription of hydrocodone provided.  Consent for opiate prescription signed and scanned into chart.  Patient understands this prescription is a controlled substance which is potentially habit-forming and its use is regulated by the [facility]. Refills are subject to terms of a controlled substance treatment agreement. This medicine can cause nausea, significant constipation, sedation, confusion.  Obtained and reviewed patient utilization report from [facility] pharmacy database on 8/17/2020 prior to writing prescription for controlled substance II, III or IV per [place] bill . Based on assessment of the report, the prescription hydrocodone is medically necessary.   - predniSONE (DELTASONE) 10 MG Tab; Take with food. Take 6 pills on day 1, then 5 pills on days 2 and 3, then 4 pills on days 4 and 5, then 3 pills on days 6 and 7, then 2 pills on days 8 and 9, then 1 pill on day 10, then stop.  Dispense: 35 Tab; Refill: 1  - Consent for Opiate Prescription    Educated in proper  administration of medication(s) ordered today including safety, possible SE, risks, benefits, rationale and alternatives to therapy.   Supportive care, differential diagnoses, and indications for immediate follow-up discussed with patient.    Pathogenesis of diagnosis discussed including typical length and natural progression.    Instructed to return to clinic or nearest emergency department for any change in condition, further concerns, or worsening of symptoms.  Patient states understanding of the plan of care and discharge instructions.    Return if symptoms worsen or fail to improve.    Please note that this dictation was created using voice recognition software. I have made every reasonable attempt to correct obvious errors, but I expect that there are errors of grammar and possibly content that I did not discover before finalizing the note.

## 2020-09-02 DIAGNOSIS — G89.29 CHRONIC LEFT-SIDED LOW BACK PAIN WITH LEFT-SIDED SCIATICA: ICD-10-CM

## 2020-09-02 DIAGNOSIS — M54.42 CHRONIC LEFT-SIDED LOW BACK PAIN WITH LEFT-SIDED SCIATICA: ICD-10-CM

## 2020-09-02 RX ORDER — HYDROCODONE BITARTRATE AND ACETAMINOPHEN 10; 325 MG/1; MG/1
.5-1 TABLET ORAL EVERY 8 HOURS PRN
Qty: 21 TAB | Refills: 0 | Status: SHIPPED | OUTPATIENT
Start: 2020-09-02 | End: 2021-12-07 | Stop reason: SDUPTHER

## 2020-09-02 NOTE — PROGRESS NOTES
1. Chronic left-sided low back pain with left-sided sciatica  - HYDROcodone/acetaminophen (NORCO)  MG Tab; Take 0.5-1 Tabs by mouth every 8 hours as needed for Severe Pain for up to 7 days.  Dispense: 21 Tab; Refill: 0

## 2020-09-29 DIAGNOSIS — M85.80 OSTEOPENIA, UNSPECIFIED LOCATION: ICD-10-CM

## 2020-09-29 NOTE — TELEPHONE ENCOUNTER
Received request via: Pharmacy    Was the patient seen in the last year in this department? Yes LOV 08/17/2020    Does the patient have an active prescription (recently filled or refills available) for medication(s) requested? No

## 2020-09-30 RX ORDER — ALENDRONATE SODIUM 70 MG/1
70 TABLET ORAL
Qty: 12 TAB | Refills: 3 | Status: SHIPPED | OUTPATIENT
Start: 2020-09-30 | End: 2021-08-23

## 2020-09-30 NOTE — TELEPHONE ENCOUNTER
Requested Prescriptions     Pending Prescriptions Disp Refills   • alendronate (FOSAMAX) 70 MG Tab 12 Tab 3     Sig: Take 1 Tab by mouth every 7 days. On an empty stomach with a full glass of water, remain upright for 30-60 mins       SHYANN Hdez.

## 2020-10-07 ENCOUNTER — APPOINTMENT (RX ONLY)
Dept: URBAN - METROPOLITAN AREA CLINIC 22 | Facility: CLINIC | Age: 70
Setting detail: DERMATOLOGY
End: 2020-10-07

## 2020-10-07 DIAGNOSIS — L30.4 ERYTHEMA INTERTRIGO: ICD-10-CM

## 2020-10-07 DIAGNOSIS — L82.1 OTHER SEBORRHEIC KERATOSIS: ICD-10-CM

## 2020-10-07 PROCEDURE — 99202 OFFICE O/P NEW SF 15 MIN: CPT

## 2020-10-07 PROCEDURE — ? PHOTO-DOCUMENTATION

## 2020-10-07 PROCEDURE — ? PRESCRIPTION

## 2020-10-07 PROCEDURE — ? PRESCRIPTION SAMPLES PROVIDED

## 2020-10-07 PROCEDURE — ? TREATMENT REGIMEN

## 2020-10-07 PROCEDURE — ? COUNSELING

## 2020-10-07 RX ORDER — ECONAZOLE NITRATE 10 MG/G
CREAM TOPICAL BID
Qty: 1 | Refills: 3 | Status: ERX | COMMUNITY
Start: 2020-10-07

## 2020-10-07 RX ORDER — CALCIUM ACETATE/ALUMINUM SULF
TABLET, EFFERVESCENT TOPICAL
Qty: 1 | Refills: 3 | Status: ERX | COMMUNITY
Start: 2020-10-07

## 2020-10-07 RX ADMIN — ECONAZOLE NITRATE 1: 10 CREAM TOPICAL at 00:00

## 2020-10-07 RX ADMIN — Medication: at 00:00

## 2020-10-07 ASSESSMENT — LOCATION ZONE DERM: LOCATION ZONE: LEG

## 2020-10-07 ASSESSMENT — LOCATION DETAILED DESCRIPTION DERM: LOCATION DETAILED: RIGHT MEDIAL DISTAL PRETIBIAL REGION

## 2020-10-07 ASSESSMENT — SEVERITY ASSESSMENT: SEVERITY: MODERATE

## 2020-10-07 ASSESSMENT — LOCATION SIMPLE DESCRIPTION DERM: LOCATION SIMPLE: RIGHT PRETIBIAL REGION

## 2020-10-07 NOTE — PROCEDURE: TREATMENT REGIMEN
Initiate Treatment: econazole 1 % topical cream Apply twice daily to rash for two 2 weeks\\nDomeboro 952 mg-1,347 mg Empty packet into water Soak towel in solution apply to rash  daily for 1-2 weeks when flaring. Let sit for 10minutes\\nTriple paste af or desitin once daily
Detail Level: Simple

## 2020-10-07 NOTE — HPI: RASH
What Type Of Note Output Would You Prefer (Optional)?: Standard Output
How Severe Is Your Rash?: moderate
Is This A New Presentation, Or A Follow-Up?: Rash
Additional History: Patient is currently on clotrimazole / betamethsone cream.\\n

## 2020-10-22 ENCOUNTER — OFFICE VISIT (OUTPATIENT)
Dept: MEDICAL GROUP | Facility: PHYSICIAN GROUP | Age: 70
End: 2020-10-22
Payer: MEDICARE

## 2020-10-22 VITALS
HEART RATE: 83 BPM | SYSTOLIC BLOOD PRESSURE: 110 MMHG | BODY MASS INDEX: 35.85 KG/M2 | DIASTOLIC BLOOD PRESSURE: 62 MMHG | HEIGHT: 64 IN | TEMPERATURE: 97.4 F | WEIGHT: 210 LBS | OXYGEN SATURATION: 95 %

## 2020-10-22 DIAGNOSIS — M23.203 DERANGEMENT OF MEDIAL MENISCUS OF RIGHT KNEE DUE TO OLD INJURY: ICD-10-CM

## 2020-10-22 PROCEDURE — 99214 OFFICE O/P EST MOD 30 MIN: CPT | Performed by: NURSE PRACTITIONER

## 2020-10-22 ASSESSMENT — FIBROSIS 4 INDEX: FIB4 SCORE: 1.61

## 2020-10-22 NOTE — ASSESSMENT & PLAN NOTE
This is a new problem to the examiner. Chronic problem for the patient that is worsening.  Patient reports that she had right knee surgery and 2017 and physical therapy after surgery was delayed due to right Achilles tendon issues.  Reports that she has had pain and issues with the right knee since the surgery.  She was seen by orthopedics at [facility], states that she was told the surgery would offer relief anywhere 1-15 years, she does not want to return to this practice.  Reports that she is following with [facility] and participating in physical therapy for chronic low back pain.  Her right knee pain has worsened over the last month, but she does not believe she did anything specific to aggravate it.  States that she has noticed a decrease in her range of motion as well as an increase in popping coming from her right knee.  During physical therapy for her back, if she is doing any exercises that are aggravating the knee she is telling her therapist and they are modifying treatment.  At baseline, she is unable to fully straighten her right leg without excruciating pain in the back of her right knee.  States that she has weakness and pain with just lifting her leg, and kneeling causes significant pain.  States that her knee is tender at all times and she is constantly aware of it.  Her physical therapist gave her exercises that she is able to do at home to stretch the hamstring as well as the nerves in the right lower extremity, states they have been slightly beneficial.  She has not tried any over-the-counter medications for the pain, she does take piroxicam 10 mg twice daily, and an herbal supplement that is helpful with inflammation, and takes an aspirin daily.  In the past, topical anti-inflammatory such as Aspercreme and diclofenac gel have not been helpful for joint pain.  She has not tried ice or heat during this current episode.

## 2020-10-23 NOTE — PROGRESS NOTES
CC:   Chief Complaint   Patient presents with   • Knee Pain     Right     HISTORY OF THE PRESENT ILLNESS: Patient is a 70 y.o. female. This pleasant patient is here today to discuss worsening chronic right knee pain.    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 Chronic problem for the patient that is worsening.  Patient reports that she had right knee surgery and 2017 and physical therapy after surgery was delayed due to right Achilles tendon issues.  Reports that she has had pain and issues with the right knee since the surgery.  She was seen by orthopedics at [facility], states that she was told the surgery would offer relief anywhere 1-15 years, she does not want to return to this practice.  Reports that she is following with [facility] and participating in physical therapy for chronic low back pain.  Her right knee pain has worsened over the last month, but she does not believe she did anything specific to aggravate it.  States that she has noticed a decrease in her range of motion as well as an increase in popping coming from her right knee.  During physical therapy for her back, if she is doing any exercises that are aggravating the knee she is telling her therapist and they are modifying treatment.  At baseline, she is unable to fully straighten her right leg without excruciating pain in the back of her right knee.  States that she has weakness and pain with just lifting her leg, and kneeling causes significant pain.  States that her knee is tender at all times and she is constantly aware of it.  Her physical therapist gave her exercises that she is able to do at home to stretch the hamstring as well as the nerves in the right lower extremity, states they have been slightly beneficial.  She has not tried any over-the-counter medications for the pain, she does take piroxicam 10 mg twice daily, and an  herbal supplement that is helpful with inflammation, and takes an aspirin daily.  In the past, topical anti-inflammatory such as Aspercreme and diclofenac gel have not been helpful for joint pain.  She has not tried ice or heat during this current episode.    Allergies: Meloxicam  Current Outpatient Medications Ordered in Epic   Medication Sig Dispense Refill   • econazole nitrate 1 % cream Apply  to affected area(s) 2 times a day.     • alendronate (FOSAMAX) 70 MG Tab Take 1 Tab by mouth every 7 days. On an empty stomach with a full glass of water, remain upright for 30-60 mins 12 Tab 3   • predniSONE (DELTASONE) 10 MG Tab Take with food. Take 6 pills on day 1, then 5 pills on days 2 and 3, then 4 pills on days 4 and 5, then 3 pills on days 6 and 7, then 2 pills on days 8 and 9, then 1 pill on day 10, then stop. 35 Tab 1   • tizanidine (ZANAFLEX) 4 MG Tab Take 1 Tab by mouth every 6 hours as needed. For muscle spasm 30 Tab 3   • levothyroxine (SYNTHROID) 50 MCG Tab Take 1 Tab by mouth Every morning on an empty stomach. 90 Tab 3   • pravastatin (PRAVACHOL) 40 MG tablet TAKE ONE TABLET BY MOUTH ONCE DAILY AT BEDTIME 100 Tab 2   • triamcinolone (KENALOG) 0.1 % lotion APPLY TO SCALP AFTER SHAMPOOING WITH SELSUN BLUE SHAMPOO 60 mL 2   • piroxicam (FELDENE) 10 MG Cap Take 1 Cap by mouth 2 times a day, with meals. 180 Cap 3   • cetirizine (ZYRTEC) 10 MG Tab Take 10 mg by mouth every day.     • vitamin D3, cholecalciferol, 1000 UNIT Tab Take 1 Tab by mouth every day. 100 Tab 3   • acetaminophen (TYLENOL) 500 MG Tab Take 500-1,000 mg by mouth 3 times a day.     • ascorbic acid (ASCORBIC ACID) 500 MG Tab Take 1,000 mg by mouth every day.     • BETA CAROTENE PO Take 10,000 Int'l Units by mouth every day.     • B Complex-C-Folic Acid (STRESS B COMPLEX PO) Take  by mouth 2 Times a Day.     • Calcium 1500 MG TABS Take  by mouth every day.     • Magnesium 500 MG CAPS Take 875 mg by mouth every evening.     • Potassium (POTASSIMIN  PO) Take 45 mg by mouth every day.       No current [facility]-ordered facility-administered medications on file.      Past Medical History:   Diagnosis Date   • Arthritis     to joints   • Disorder of thyroid     hypoactive   • Family history of diabetes mellitus 2/17/2015   • High cholesterol    • Hyperlipidemia    • Pain 4/22/16    low back-chronic   • Pain 1/27/17    right knee   • Personal history of colonic polyps 2/12/2020    Goes to [facility].   • Screening mammogram for high-risk patient 10/7/2019     Past Surgical History:   Procedure Laterality Date   • KNEE ARTHROSCOPY Right 1/30/2017    Procedure: KNEE ARTHROSCOPY;  Surgeon: Will Mattson M.D.;  Location: SURGERY [facility];  Service:    • MEDIAL MENISCECTOMY  1/30/2017    Procedure: MEDIAL MENISCECTOMY - PARTIAL;  Surgeon: Will Mattson M.D.;  Location: SURGERY [facility];  Service:    • TRIGGER FINGER RELEASE Left 4/27/2016    Procedure: TRIGGER FINGER RELEASE - THUMB;  Surgeon: Bhavin Barker M.D.;  Location: SURGERY [facility];  Service:    • DEQUERVAINS RELEASE Right 3/2015    wrist   • COLONOSCOPY  2009   • SHOULDER SURGERY Left 2008    removal of bone spur left shoulder     Social History     Tobacco Use   • Smoking status: Never Smoker   • Smokeless tobacco: Never Used   Substance Use Topics   • Alcohol use: No   • Drug use: No     Social History     Social History Narrative   • Not on file     Family History   Problem Relation Age of Onset   • Stroke Father    • Diabetes Father    • Asthma Mother    • Heart Attack Mother 55   • Obesity Mother    • No Known Problems Brother    • No Known Problems Maternal Grandmother    • Cancer Maternal Grandfather    • No Known Problems Paternal Grandmother    • No Known Problems Paternal Grandfather    • Cancer Brother         throat cancer     ROS:   Constitutional: No fevers, chills, malaise/fatigue.  Eyes: No eye pain.  ENT: No sore throat, congestion.   Resp: No cough,  "shortness of breath.  CV: No chest pain, leg swelling, palpitations.  GI: No nausea/vomiting, abdominal pain, constipation, diarrhea.  : No dysuria, hematuria.  MSK: + Chronic back pain and chronic right knee pain currently worsening with right lower extremity weakness.  Neuro: No dizziness, weakness, headaches.  Psych: No suicidal ideations.    All remaining systems reviewed and found to be negative, except as stated above.        Exam: /62 (BP Location: Left arm, Patient Position: Sitting, BP Cuff Size: Adult)   Pulse 83   Temp 36.3 °C (97.4 °F) (Temporal)   Ht 1.626 m (5' 4\")   Wt 95.3 kg (210 lb)   SpO2 95%  Body mass index is 36.05 kg/m².    General: Well nourished, well developed female in NAD, awake and conversant.  Eyes: Normal conjunctiva, anicteric.  Round symmetrical pupils.  ENT: Hearing grossly intact.  No nasal discharge.  Neck: Neck is supple.  No masses or thyromegaly.  CV: No lower extremity edema.  Respiratory: Respirations are nonlabored.  No wheezing.  Abdomen: Non-Distended.  Skin: Warm.  No rashes or ulcers.  MSK: Antalgic gait.  No clubbing or cyanosis.  Right Knee:    positive exam findings: + edema, + crepitus, + medial joint line tenderness, + lateral joint line tenderness, decreased ROM  negative exam findings: no erythema, ACL stable, MCL stable  Neuro: Sensation and CN II-XII grossly normal.  Psych: Alert and oriented.  Cooperative, appropriate mood and affect, normal judgment.     Assessment/Plan:  1. 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  Patient is interested in referral to orthopedics, does not want to return to [facility], is okay with being seen at [facility].  Declines imaging at this time including x-ray or advanced imaging with CT or MRI.  Patient would like to have imaging ordered by orthopedic specialist.  Encourage patient to continue " prescription anti-inflammatories.  Continue exercises as recommended by physical therapy.  Recommended over-the-counter anti-inflammatory topical creams with lidocaine.  Encouraged alternating ice and heat to the right knee.  Recommend Ace bandage or knee brace to right knee, may be helpful with pain and offer support.  - REFERRAL TO ORTHOPEDICS    Educated in proper administration of medication(s) ordered today including safety, possible SE, risks, benefits, rationale and alternatives to therapy.   Supportive care, differential diagnoses, and indications for immediate follow-up discussed with patient.    Pathogenesis of diagnosis discussed including typical length and natural progression.    Instructed to return to clinic or nearest emergency department for any change in condition, further concerns, or worsening of symptoms.  Patient states understanding of the plan of care and discharge instructions.    Return if symptoms worsen or fail to improve.    Please note that this dictation was created using voice recognition software. I have made every reasonable attempt to correct obvious errors, but I expect that there are errors of grammar and possibly content that I did not discover before finalizing the note.

## 2020-10-29 NOTE — PROGRESS NOTES
Verified 3 forms of HIPAA with member.   Member called to inquire if a referral was requested for Orthopedics, advised there was a referral in Epic. Also advised she did not need a referral to see specialist however she would need authorization for treatment. Member stated that she understood.  Systems used: Buzzmove/Team-[id number]

## 2020-11-17 ENCOUNTER — NON-PROVIDER VISIT (OUTPATIENT)
Dept: MEDICAL GROUP | Facility: PHYSICIAN GROUP | Age: 70
End: 2020-11-17
Payer: MEDICARE

## 2020-11-17 DIAGNOSIS — Z23 NEED FOR VACCINATION: ICD-10-CM

## 2020-11-17 PROCEDURE — 90471 IMMUNIZATION ADMIN: CPT | Performed by: NURSE PRACTITIONER

## 2020-11-17 PROCEDURE — 90715 TDAP VACCINE 7 YRS/> IM: CPT | Performed by: NURSE PRACTITIONER

## 2020-11-17 NOTE — NON-PROVIDER
"Deneen Temple is a 70 y.o. female here for a non-provider visit for:   TDAP    Reason for immunization: Overdue/Provider Recommended  Immunization records indicate need for vaccine: Yes, confirmed with Epic  Minimum interval has been met for this vaccine: Yes  ABN completed: Not Indicated    Order and dose verified by: SEVEN  VIS Dated  8/15/19 was given to patient: Yes  All IAC Questionnaire questions were answered \"No.\"    Patient tolerated injection and no adverse effects were observed or reported: Yes    Pt scheduled for next dose in series: No    "

## 2020-12-02 ENCOUNTER — PRE-ADMISSION TESTING (OUTPATIENT)
Dept: ADMISSIONS | Facility: MEDICAL CENTER | Age: 70
End: 2020-12-02
Attending: ORTHOPAEDIC SURGERY
Payer: MEDICARE

## 2020-12-02 DIAGNOSIS — Z01.810 PRE-OPERATIVE CARDIOVASCULAR EXAMINATION: ICD-10-CM

## 2020-12-02 DIAGNOSIS — Z01.812 PRE-OPERATIVE LABORATORY EXAMINATION: ICD-10-CM

## 2020-12-02 LAB
ANION GAP SERPL CALC-SCNC: 11 MMOL/L (ref 7–16)
BASOPHILS # BLD AUTO: 0.9 % (ref 0–1.8)
BASOPHILS # BLD: 0.06 K/UL (ref 0–0.12)
BUN SERPL-MCNC: 17 MG/DL (ref 8–22)
CALCIUM SERPL-MCNC: 9.3 MG/DL (ref 8.4–10.2)
CHLORIDE SERPL-SCNC: 108 MMOL/L (ref 96–112)
CO2 SERPL-SCNC: 23 MMOL/L (ref 20–33)
CREAT SERPL-MCNC: 0.87 MG/DL (ref 0.5–1.4)
EKG IMPRESSION: NORMAL
EOSINOPHIL # BLD AUTO: 0.24 K/UL (ref 0–0.51)
EOSINOPHIL NFR BLD: 3.6 % (ref 0–6.9)
ERYTHROCYTE [DISTWIDTH] IN BLOOD BY AUTOMATED COUNT: 42.5 FL (ref 35.9–50)
GLUCOSE SERPL-MCNC: 94 MG/DL (ref 65–99)
HCT VFR BLD AUTO: 47.1 % (ref 37–47)
HGB BLD-MCNC: 15 G/DL (ref 12–16)
IMM GRANULOCYTES # BLD AUTO: 0.02 K/UL (ref 0–0.11)
IMM GRANULOCYTES NFR BLD AUTO: 0.3 % (ref 0–0.9)
LYMPHOCYTES # BLD AUTO: 1.6 K/UL (ref 1–4.8)
LYMPHOCYTES NFR BLD: 23.7 % (ref 22–41)
MCH RBC QN AUTO: 28.1 PG (ref 27–33)
MCHC RBC AUTO-ENTMCNC: 31.8 G/DL (ref 33.6–35)
MCV RBC AUTO: 88.4 FL (ref 81.4–97.8)
MONOCYTES # BLD AUTO: 0.55 K/UL (ref 0–0.85)
MONOCYTES NFR BLD AUTO: 8.1 % (ref 0–13.4)
NEUTROPHILS # BLD AUTO: 4.28 K/UL (ref 2–7.15)
NEUTROPHILS NFR BLD: 63.4 % (ref 44–72)
NRBC # BLD AUTO: 0 K/UL
NRBC BLD-RTO: 0 /100 WBC
PLATELET # BLD AUTO: 247 K/UL (ref 164–446)
PMV BLD AUTO: 10.1 FL (ref 9–12.9)
POTASSIUM SERPL-SCNC: 4.2 MMOL/L (ref 3.6–5.5)
RBC # BLD AUTO: 5.33 M/UL (ref 4.2–5.4)
SODIUM SERPL-SCNC: 142 MMOL/L (ref 135–145)
WBC # BLD AUTO: 6.8 K/UL (ref 4.8–10.8)

## 2020-12-02 PROCEDURE — 93010 ELECTROCARDIOGRAM REPORT: CPT | Performed by: INTERNAL MEDICINE

## 2020-12-02 PROCEDURE — 87389 HIV-1 AG W/HIV-1&-2 AB AG IA: CPT

## 2020-12-02 PROCEDURE — 87641 MR-STAPH DNA AMP PROBE: CPT

## 2020-12-02 PROCEDURE — 93005 ELECTROCARDIOGRAM TRACING: CPT

## 2020-12-02 PROCEDURE — 36415 COLL VENOUS BLD VENIPUNCTURE: CPT

## 2020-12-02 PROCEDURE — 87640 STAPH A DNA AMP PROBE: CPT | Mod: XU

## 2020-12-02 PROCEDURE — 85025 COMPLETE CBC W/AUTO DIFF WBC: CPT

## 2020-12-02 PROCEDURE — 80048 BASIC METABOLIC PNL TOTAL CA: CPT

## 2020-12-02 RX ORDER — ASPIRIN 81 MG/1
81 TABLET ORAL DAILY
Status: ON HOLD | COMMUNITY
End: 2020-12-16 | Stop reason: SDUPTHER

## 2020-12-02 RX ORDER — HYDROCODONE BITARTRATE AND ACETAMINOPHEN 10; 325 MG/1; MG/1
1-2 TABLET ORAL EVERY 6 HOURS PRN
COMMUNITY
End: 2021-12-07

## 2020-12-02 ASSESSMENT — FIBROSIS 4 INDEX: FIB4 SCORE: 1.61

## 2020-12-02 NOTE — OR NURSING
Preadmit instructions and anesthesia fasting instructions given. Any patient questions addressed. Pt instructed to take these medications on day of surgery: tylenol, norco, synthroid. Total joint info given and covid self-isolation instructions given. Pt spoke with .    Breath Sounds equal & clear to percussion & auscultation, no accessory muscle use

## 2020-12-03 LAB
HIV 1+2 AB+HIV1 P24 AG SERPL QL IA: NORMAL
SCCMEC + MECA PNL NOSE NAA+PROBE: NEGATIVE
SCCMEC + MECA PNL NOSE NAA+PROBE: NEGATIVE

## 2020-12-03 NOTE — DISCHARGE PLANNING
DISCHARGE PLANNING NOTE - TOTAL JOINT     Procedure: Procedure(s):  ARTHROPLASTY, KNEE, TOTAL  Procedure Date: 12/16/2020  Insurance:  Payor: [employer]US / Plan: S[employer]US / Product Type: *No Product type* /   Equipment currently available at home? front-wheel walker, raised toilet seat, shower chair and ice, bars in bathroom.  Steps into the home? 0  Steps within the home? 0  Toilet height? Standard  Type of shower? walk-in shower  Who will be with you during your recovery? Spouse.  Is Outpatient Physical Therapy set up after surgery? Yes  Did you take the Total Joint Class and where? Yes, at [facility] book given to pt.   Planning on same day discharge? Yes.       Late entry for 12/2/20 @ 1530:  This writer met with pt during her preadmission appointment. Pt states she has all needed equipment. Pt will be receiving assistance with meals from her [employer] friends. Home safety checklist reviewed and given to pt. Pt educated to dc criteria. All questions answered and pt verbalizes understanding of instructions. No dc needs identified at this time. Anticipate dc to home with spouse without barriers.

## 2020-12-09 ENCOUNTER — PATIENT MESSAGE (OUTPATIENT)
Dept: MEDICAL GROUP | Facility: PHYSICIAN GROUP | Age: 70
End: 2020-12-09

## 2020-12-09 DIAGNOSIS — E78.2 MIXED HYPERLIPIDEMIA: ICD-10-CM

## 2020-12-09 RX ORDER — PRAVASTATIN SODIUM 40 MG
TABLET ORAL
Qty: 100 TAB | Refills: 2 | Status: SHIPPED | OUTPATIENT
Start: 2020-12-09 | End: 2021-10-13

## 2020-12-10 NOTE — TELEPHONE ENCOUNTER
Requested Prescriptions     Pending Prescriptions Disp Refills   • pravastatin (PRAVACHOL) 40 MG tablet 100 Tab 2     Sig: TAKE ONE TABLET BY MOUTH ONCE DAILY AT BEDTIME       SHYANN Hdez.

## 2020-12-11 ENCOUNTER — APPOINTMENT (OUTPATIENT)
Dept: ADMISSIONS | Facility: MEDICAL CENTER | Age: 70
End: 2020-12-11
Attending: ORTHOPAEDIC SURGERY
Payer: MEDICARE

## 2020-12-14 ENCOUNTER — APPOINTMENT (OUTPATIENT)
Dept: ADMISSIONS | Facility: MEDICAL CENTER | Age: 70
End: 2020-12-14
Attending: ORTHOPAEDIC SURGERY
Payer: MEDICARE

## 2020-12-14 DIAGNOSIS — Z01.812 PRE-OPERATIVE LABORATORY EXAMINATION: ICD-10-CM

## 2020-12-14 LAB
COVID ORDER STATUS COVID19: NORMAL
SARS-COV-2 RNA RESP QL NAA+PROBE: NOTDETECTED
SPECIMEN SOURCE: NORMAL

## 2020-12-14 PROCEDURE — C9803 HOPD COVID-19 SPEC COLLECT: HCPCS

## 2020-12-14 PROCEDURE — U0003 INFECTIOUS AGENT DETECTION BY NUCLEIC ACID (DNA OR RNA); SEVERE ACUTE RESPIRATORY SYNDROME CORONAVIRUS 2 (SARS-COV-2) (CORONAVIRUS DISEASE [COVID-19]), AMPLIFIED PROBE TECHNIQUE, MAKING USE OF HIGH THROUGHPUT TECHNOLOGIES AS DESCRIBED BY CMS-2020-01-R: HCPCS

## 2020-12-15 ENCOUNTER — ANESTHESIA EVENT (OUTPATIENT)
Dept: SURGERY | Facility: MEDICAL CENTER | Age: 70
End: 2020-12-15
Payer: MEDICARE

## 2020-12-16 ENCOUNTER — ANESTHESIA (OUTPATIENT)
Dept: SURGERY | Facility: MEDICAL CENTER | Age: 70
End: 2020-12-16
Payer: MEDICARE

## 2020-12-16 ENCOUNTER — HOSPITAL ENCOUNTER (OUTPATIENT)
Facility: MEDICAL CENTER | Age: 70
End: 2020-12-16
Attending: ORTHOPAEDIC SURGERY | Admitting: ORTHOPAEDIC SURGERY
Payer: MEDICARE

## 2020-12-16 VITALS
WEIGHT: 211.42 LBS | HEIGHT: 64 IN | BODY MASS INDEX: 36.09 KG/M2 | RESPIRATION RATE: 16 BRPM | HEART RATE: 68 BPM | TEMPERATURE: 96.9 F | SYSTOLIC BLOOD PRESSURE: 103 MMHG | DIASTOLIC BLOOD PRESSURE: 57 MMHG | OXYGEN SATURATION: 97 %

## 2020-12-16 DIAGNOSIS — Z96.651 STATUS POST RIGHT KNEE REPLACEMENT: ICD-10-CM

## 2020-12-16 PROCEDURE — 700102 HCHG RX REV CODE 250 W/ 637 OVERRIDE(OP): Performed by: ORTHOPAEDIC SURGERY

## 2020-12-16 PROCEDURE — 160035 HCHG PACU - 1ST 60 MINS PHASE I: Performed by: ORTHOPAEDIC SURGERY

## 2020-12-16 PROCEDURE — 97162 PT EVAL MOD COMPLEX 30 MIN: CPT

## 2020-12-16 PROCEDURE — A9270 NON-COVERED ITEM OR SERVICE: HCPCS | Performed by: ANESTHESIOLOGY

## 2020-12-16 PROCEDURE — 700111 HCHG RX REV CODE 636 W/ 250 OVERRIDE (IP): Performed by: ORTHOPAEDIC SURGERY

## 2020-12-16 PROCEDURE — 700105 HCHG RX REV CODE 258: Performed by: ORTHOPAEDIC SURGERY

## 2020-12-16 PROCEDURE — 502579 HCHG PACK, TOTAL KNEE: Performed by: ORTHOPAEDIC SURGERY

## 2020-12-16 PROCEDURE — 700102 HCHG RX REV CODE 250 W/ 637 OVERRIDE(OP): Performed by: PHYSICIAN ASSISTANT

## 2020-12-16 PROCEDURE — 160002 HCHG RECOVERY MINUTES (STAT): Performed by: ORTHOPAEDIC SURGERY

## 2020-12-16 PROCEDURE — 96375 TX/PRO/DX INJ NEW DRUG ADDON: CPT | Mod: XU

## 2020-12-16 PROCEDURE — 501838 HCHG SUTURE GENERAL: Performed by: ORTHOPAEDIC SURGERY

## 2020-12-16 PROCEDURE — 160036 HCHG PACU - EA ADDL 30 MINS PHASE I: Performed by: ORTHOPAEDIC SURGERY

## 2020-12-16 PROCEDURE — 96374 THER/PROPH/DIAG INJ IV PUSH: CPT | Mod: XU

## 2020-12-16 PROCEDURE — 97165 OT EVAL LOW COMPLEX 30 MIN: CPT

## 2020-12-16 PROCEDURE — 700101 HCHG RX REV CODE 250: Performed by: ORTHOPAEDIC SURGERY

## 2020-12-16 PROCEDURE — 64447 NJX AA&/STRD FEMORAL NRV IMG: CPT | Performed by: ORTHOPAEDIC SURGERY

## 2020-12-16 PROCEDURE — 700111 HCHG RX REV CODE 636 W/ 250 OVERRIDE (IP): Performed by: ANESTHESIOLOGY

## 2020-12-16 PROCEDURE — 700101 HCHG RX REV CODE 250: Performed by: PHYSICIAN ASSISTANT

## 2020-12-16 PROCEDURE — G0378 HOSPITAL OBSERVATION PER HR: HCPCS

## 2020-12-16 PROCEDURE — C1776 JOINT DEVICE (IMPLANTABLE): HCPCS | Performed by: ORTHOPAEDIC SURGERY

## 2020-12-16 PROCEDURE — 97535 SELF CARE MNGMENT TRAINING: CPT

## 2020-12-16 PROCEDURE — L8699 PROSTHETIC IMPLANT NOS: HCPCS | Performed by: ORTHOPAEDIC SURGERY

## 2020-12-16 PROCEDURE — 700105 HCHG RX REV CODE 258: Performed by: ANESTHESIOLOGY

## 2020-12-16 PROCEDURE — 700101 HCHG RX REV CODE 250: Performed by: ANESTHESIOLOGY

## 2020-12-16 PROCEDURE — 302135 SEQUENTIAL COMPRESSION MACHINE: Performed by: ORTHOPAEDIC SURGERY

## 2020-12-16 PROCEDURE — A9270 NON-COVERED ITEM OR SERVICE: HCPCS | Performed by: PHYSICIAN ASSISTANT

## 2020-12-16 PROCEDURE — 500864 HCHG NEEDLE, SPINAL 18G: Performed by: ORTHOPAEDIC SURGERY

## 2020-12-16 PROCEDURE — 160041 HCHG SURGERY MINUTES - EA ADDL 1 MIN LEVEL 4: Performed by: ORTHOPAEDIC SURGERY

## 2020-12-16 PROCEDURE — 700102 HCHG RX REV CODE 250 W/ 637 OVERRIDE(OP): Performed by: ANESTHESIOLOGY

## 2020-12-16 PROCEDURE — A9270 NON-COVERED ITEM OR SERVICE: HCPCS | Performed by: ORTHOPAEDIC SURGERY

## 2020-12-16 PROCEDURE — 700111 HCHG RX REV CODE 636 W/ 250 OVERRIDE (IP): Performed by: PHYSICIAN ASSISTANT

## 2020-12-16 PROCEDURE — 160048 HCHG OR STATISTICAL LEVEL 1-5: Performed by: ORTHOPAEDIC SURGERY

## 2020-12-16 PROCEDURE — 160029 HCHG SURGERY MINUTES - 1ST 30 MINS LEVEL 4: Performed by: ORTHOPAEDIC SURGERY

## 2020-12-16 PROCEDURE — 160009 HCHG ANES TIME/MIN: Performed by: ORTHOPAEDIC SURGERY

## 2020-12-16 DEVICE — IMPLANT GII OVAL RESURFACING PAT 32MM (1EA): Type: IMPLANTABLE DEVICE | Site: KNEE | Status: FUNCTIONAL

## 2020-12-16 DEVICE — INSERT CR XLPE JOURNEY II  RIGHT SIZE 3-4 10MM: Type: IMPLANTABLE DEVICE | Site: KNEE | Status: FUNCTIONAL

## 2020-12-16 DEVICE — BASE TIBIAL NONPOROUS JOURNEY II RIGHT SIZE 3: Type: IMPLANTABLE DEVICE | Site: KNEE | Status: FUNCTIONAL

## 2020-12-16 DEVICE — CEMENT ORTHOPEDIC HV US  (10/PK): Type: IMPLANTABLE DEVICE | Site: KNEE | Status: FUNCTIONAL

## 2020-12-16 DEVICE — FEMUR TKA OXINIUM CR JOURNEY II NP RIGHT SIZE 4: Type: IMPLANTABLE DEVICE | Site: KNEE | Status: FUNCTIONAL

## 2020-12-16 RX ORDER — CHLORPROMAZINE HYDROCHLORIDE 25 MG/1
25 TABLET, FILM COATED ORAL EVERY 6 HOURS PRN
Status: DISCONTINUED | OUTPATIENT
Start: 2020-12-16 | End: 2020-12-16 | Stop reason: HOSPADM

## 2020-12-16 RX ORDER — OXYCODONE HYDROCHLORIDE 10 MG/1
10 TABLET ORAL
Status: DISCONTINUED | OUTPATIENT
Start: 2020-12-16 | End: 2020-12-16 | Stop reason: HOSPADM

## 2020-12-16 RX ORDER — CEFAZOLIN SODIUM 1 G/3ML
INJECTION, POWDER, FOR SOLUTION INTRAMUSCULAR; INTRAVENOUS PRN
Status: DISCONTINUED | OUTPATIENT
Start: 2020-12-16 | End: 2020-12-16 | Stop reason: SURG

## 2020-12-16 RX ORDER — BISACODYL 10 MG
10 SUPPOSITORY, RECTAL RECTAL
Status: DISCONTINUED | OUTPATIENT
Start: 2020-12-16 | End: 2020-12-16 | Stop reason: HOSPADM

## 2020-12-16 RX ORDER — ENEMA 19; 7 G/133ML; G/133ML
1 ENEMA RECTAL
Status: DISCONTINUED | OUTPATIENT
Start: 2020-12-16 | End: 2020-12-16 | Stop reason: HOSPADM

## 2020-12-16 RX ORDER — HYDROMORPHONE HYDROCHLORIDE 2 MG/ML
INJECTION, SOLUTION INTRAMUSCULAR; INTRAVENOUS; SUBCUTANEOUS PRN
Status: DISCONTINUED | OUTPATIENT
Start: 2020-12-16 | End: 2020-12-16 | Stop reason: SURG

## 2020-12-16 RX ORDER — HYDROMORPHONE HYDROCHLORIDE 1 MG/ML
0.2 INJECTION, SOLUTION INTRAMUSCULAR; INTRAVENOUS; SUBCUTANEOUS
Status: DISCONTINUED | OUTPATIENT
Start: 2020-12-16 | End: 2020-12-16 | Stop reason: HOSPADM

## 2020-12-16 RX ORDER — ACETAMINOPHEN 500 MG
1000 TABLET ORAL ONCE
Status: DISCONTINUED | OUTPATIENT
Start: 2020-12-16 | End: 2020-12-16

## 2020-12-16 RX ORDER — LIDOCAINE HYDROCHLORIDE 20 MG/ML
INJECTION, SOLUTION EPIDURAL; INFILTRATION; INTRACAUDAL; PERINEURAL PRN
Status: DISCONTINUED | OUTPATIENT
Start: 2020-12-16 | End: 2020-12-16 | Stop reason: SURG

## 2020-12-16 RX ORDER — DEXAMETHASONE SODIUM PHOSPHATE 4 MG/ML
INJECTION, SOLUTION INTRA-ARTICULAR; INTRALESIONAL; INTRAMUSCULAR; INTRAVENOUS; SOFT TISSUE PRN
Status: DISCONTINUED | OUTPATIENT
Start: 2020-12-16 | End: 2020-12-16 | Stop reason: SURG

## 2020-12-16 RX ORDER — MORPHINE SULFATE 4 MG/ML
4 INJECTION, SOLUTION INTRAMUSCULAR; INTRAVENOUS
Status: DISCONTINUED | OUTPATIENT
Start: 2020-12-16 | End: 2020-12-16 | Stop reason: HOSPADM

## 2020-12-16 RX ORDER — OXYCODONE HCL 5 MG/5 ML
10 SOLUTION, ORAL ORAL
Status: COMPLETED | OUTPATIENT
Start: 2020-12-16 | End: 2020-12-16

## 2020-12-16 RX ORDER — HYDROCODONE BITARTRATE AND ACETAMINOPHEN 10; 325 MG/1; MG/1
1 TABLET ORAL EVERY 4 HOURS PRN
Qty: 42 TAB | Refills: 0 | Status: SHIPPED | OUTPATIENT
Start: 2020-12-16 | End: 2020-12-23

## 2020-12-16 RX ORDER — DIPHENHYDRAMINE HYDROCHLORIDE 50 MG/ML
25 INJECTION INTRAMUSCULAR; INTRAVENOUS EVERY 6 HOURS PRN
Status: DISCONTINUED | OUTPATIENT
Start: 2020-12-16 | End: 2020-12-16 | Stop reason: HOSPADM

## 2020-12-16 RX ORDER — ONDANSETRON 2 MG/ML
4 INJECTION INTRAMUSCULAR; INTRAVENOUS EVERY 4 HOURS PRN
Status: DISCONTINUED | OUTPATIENT
Start: 2020-12-16 | End: 2020-12-16 | Stop reason: HOSPADM

## 2020-12-16 RX ORDER — KETOROLAC TROMETHAMINE 30 MG/ML
15 INJECTION, SOLUTION INTRAMUSCULAR; INTRAVENOUS EVERY 6 HOURS
Status: DISCONTINUED | OUTPATIENT
Start: 2020-12-16 | End: 2020-12-16 | Stop reason: HOSPADM

## 2020-12-16 RX ORDER — OXYCODONE HCL 5 MG/5 ML
5 SOLUTION, ORAL ORAL
Status: COMPLETED | OUTPATIENT
Start: 2020-12-16 | End: 2020-12-16

## 2020-12-16 RX ORDER — TIZANIDINE 4 MG/1
4 TABLET ORAL EVERY 6 HOURS PRN
Status: DISCONTINUED | OUTPATIENT
Start: 2020-12-16 | End: 2020-12-16 | Stop reason: HOSPADM

## 2020-12-16 RX ORDER — DIPHENHYDRAMINE HCL 25 MG
25 TABLET ORAL EVERY 6 HOURS PRN
Status: DISCONTINUED | OUTPATIENT
Start: 2020-12-16 | End: 2020-12-16 | Stop reason: HOSPADM

## 2020-12-16 RX ORDER — ACETAMINOPHEN 500 MG
500 TABLET ORAL ONCE
Status: COMPLETED | OUTPATIENT
Start: 2020-12-16 | End: 2020-12-16

## 2020-12-16 RX ORDER — TRANEXAMIC ACID 100 MG/ML
INJECTION, SOLUTION INTRAVENOUS PRN
Status: DISCONTINUED | OUTPATIENT
Start: 2020-12-16 | End: 2020-12-16 | Stop reason: SURG

## 2020-12-16 RX ORDER — GABAPENTIN 300 MG/1
600 CAPSULE ORAL ONCE
Status: COMPLETED | OUTPATIENT
Start: 2020-12-16 | End: 2020-12-16

## 2020-12-16 RX ORDER — MEPERIDINE HYDROCHLORIDE 25 MG/ML
12.5 INJECTION INTRAMUSCULAR; INTRAVENOUS; SUBCUTANEOUS
Status: DISCONTINUED | OUTPATIENT
Start: 2020-12-16 | End: 2020-12-16 | Stop reason: HOSPADM

## 2020-12-16 RX ORDER — LEVOTHYROXINE SODIUM 0.05 MG/1
50 TABLET ORAL
Status: DISCONTINUED | OUTPATIENT
Start: 2020-12-17 | End: 2020-12-16 | Stop reason: HOSPADM

## 2020-12-16 RX ORDER — HYDROMORPHONE HYDROCHLORIDE 1 MG/ML
0.1 INJECTION, SOLUTION INTRAMUSCULAR; INTRAVENOUS; SUBCUTANEOUS
Status: DISCONTINUED | OUTPATIENT
Start: 2020-12-16 | End: 2020-12-16 | Stop reason: HOSPADM

## 2020-12-16 RX ORDER — SODIUM CHLORIDE, SODIUM LACTATE, POTASSIUM CHLORIDE, CALCIUM CHLORIDE 600; 310; 30; 20 MG/100ML; MG/100ML; MG/100ML; MG/100ML
1000 INJECTION, SOLUTION INTRAVENOUS CONTINUOUS
Status: DISCONTINUED | OUTPATIENT
Start: 2020-12-16 | End: 2020-12-16 | Stop reason: HOSPADM

## 2020-12-16 RX ORDER — HYDROMORPHONE HYDROCHLORIDE 1 MG/ML
0.4 INJECTION, SOLUTION INTRAMUSCULAR; INTRAVENOUS; SUBCUTANEOUS
Status: DISCONTINUED | OUTPATIENT
Start: 2020-12-16 | End: 2020-12-16 | Stop reason: HOSPADM

## 2020-12-16 RX ORDER — PRAVASTATIN SODIUM 20 MG
40 TABLET ORAL
Status: DISCONTINUED | OUTPATIENT
Start: 2020-12-16 | End: 2020-12-16 | Stop reason: HOSPADM

## 2020-12-16 RX ORDER — SODIUM CHLORIDE, SODIUM LACTATE, POTASSIUM CHLORIDE, CALCIUM CHLORIDE 600; 310; 30; 20 MG/100ML; MG/100ML; MG/100ML; MG/100ML
INJECTION, SOLUTION INTRAVENOUS
Status: DISCONTINUED | OUTPATIENT
Start: 2020-12-16 | End: 2020-12-16

## 2020-12-16 RX ORDER — HALOPERIDOL 5 MG/ML
1 INJECTION INTRAMUSCULAR EVERY 6 HOURS PRN
Status: DISCONTINUED | OUTPATIENT
Start: 2020-12-16 | End: 2020-12-16 | Stop reason: HOSPADM

## 2020-12-16 RX ORDER — ONDANSETRON 2 MG/ML
4 INJECTION INTRAMUSCULAR; INTRAVENOUS
Status: DISCONTINUED | OUTPATIENT
Start: 2020-12-16 | End: 2020-12-16 | Stop reason: HOSPADM

## 2020-12-16 RX ORDER — DOCUSATE SODIUM 100 MG/1
100 CAPSULE, LIQUID FILLED ORAL 2 TIMES DAILY
Status: DISCONTINUED | OUTPATIENT
Start: 2020-12-16 | End: 2020-12-16 | Stop reason: HOSPADM

## 2020-12-16 RX ORDER — KETOROLAC TROMETHAMINE 30 MG/ML
INJECTION, SOLUTION INTRAMUSCULAR; INTRAVENOUS
Status: DISCONTINUED | OUTPATIENT
Start: 2020-12-16 | End: 2020-12-16 | Stop reason: HOSPADM

## 2020-12-16 RX ORDER — CHLORPROMAZINE HYDROCHLORIDE 25 MG/ML
25 INJECTION INTRAMUSCULAR EVERY 6 HOURS PRN
Status: DISCONTINUED | OUTPATIENT
Start: 2020-12-16 | End: 2020-12-16 | Stop reason: HOSPADM

## 2020-12-16 RX ORDER — TRAMADOL HYDROCHLORIDE 50 MG/1
50 TABLET ORAL EVERY 4 HOURS PRN
Status: DISCONTINUED | OUTPATIENT
Start: 2020-12-16 | End: 2020-12-16 | Stop reason: HOSPADM

## 2020-12-16 RX ORDER — AMOXICILLIN 250 MG
1 CAPSULE ORAL NIGHTLY
Status: DISCONTINUED | OUTPATIENT
Start: 2020-12-16 | End: 2020-12-16 | Stop reason: HOSPADM

## 2020-12-16 RX ORDER — ONDANSETRON 2 MG/ML
INJECTION INTRAMUSCULAR; INTRAVENOUS PRN
Status: DISCONTINUED | OUTPATIENT
Start: 2020-12-16 | End: 2020-12-16 | Stop reason: SURG

## 2020-12-16 RX ORDER — HYDRALAZINE HYDROCHLORIDE 20 MG/ML
5 INJECTION INTRAMUSCULAR; INTRAVENOUS
Status: DISCONTINUED | OUTPATIENT
Start: 2020-12-16 | End: 2020-12-16 | Stop reason: HOSPADM

## 2020-12-16 RX ORDER — CELECOXIB 200 MG/1
200 CAPSULE ORAL ONCE
Status: COMPLETED | OUTPATIENT
Start: 2020-12-16 | End: 2020-12-16

## 2020-12-16 RX ORDER — SCOLOPAMINE TRANSDERMAL SYSTEM 1 MG/1
1 PATCH, EXTENDED RELEASE TRANSDERMAL
Status: DISCONTINUED | OUTPATIENT
Start: 2020-12-16 | End: 2020-12-16 | Stop reason: HOSPADM

## 2020-12-16 RX ORDER — ASPIRIN 81 MG/1
81 TABLET ORAL 2 TIMES DAILY WITH MEALS
Qty: 90 TAB | Refills: 0 | COMMUNITY
Start: 2020-12-16

## 2020-12-16 RX ORDER — POLYETHYLENE GLYCOL 3350 17 G/17G
1 POWDER, FOR SOLUTION ORAL 2 TIMES DAILY PRN
Status: DISCONTINUED | OUTPATIENT
Start: 2020-12-16 | End: 2020-12-16 | Stop reason: HOSPADM

## 2020-12-16 RX ORDER — AMOXICILLIN 250 MG
1 CAPSULE ORAL
Status: DISCONTINUED | OUTPATIENT
Start: 2020-12-16 | End: 2020-12-16 | Stop reason: HOSPADM

## 2020-12-16 RX ORDER — ROPIVACAINE HYDROCHLORIDE 5 MG/ML
INJECTION, SOLUTION EPIDURAL; INFILTRATION; PERINEURAL
Status: COMPLETED | OUTPATIENT
Start: 2020-12-16 | End: 2020-12-16

## 2020-12-16 RX ORDER — BUPIVACAINE HYDROCHLORIDE AND EPINEPHRINE 2.5; 5 MG/ML; UG/ML
INJECTION, SOLUTION EPIDURAL; INFILTRATION; INTRACAUDAL; PERINEURAL
Status: DISCONTINUED | OUTPATIENT
Start: 2020-12-16 | End: 2020-12-16 | Stop reason: HOSPADM

## 2020-12-16 RX ORDER — OXYCODONE HYDROCHLORIDE 5 MG/1
5 TABLET ORAL
Status: DISCONTINUED | OUTPATIENT
Start: 2020-12-16 | End: 2020-12-16 | Stop reason: HOSPADM

## 2020-12-16 RX ORDER — DEXTROSE MONOHYDRATE, SODIUM CHLORIDE, AND POTASSIUM CHLORIDE 50; 1.49; 4.5 G/1000ML; G/1000ML; G/1000ML
INJECTION, SOLUTION INTRAVENOUS CONTINUOUS
Status: DISCONTINUED | OUTPATIENT
Start: 2020-12-16 | End: 2020-12-16 | Stop reason: HOSPADM

## 2020-12-16 RX ORDER — DIPHENHYDRAMINE HYDROCHLORIDE 50 MG/ML
12.5 INJECTION INTRAMUSCULAR; INTRAVENOUS
Status: DISCONTINUED | OUTPATIENT
Start: 2020-12-16 | End: 2020-12-16 | Stop reason: HOSPADM

## 2020-12-16 RX ORDER — DEXAMETHASONE SODIUM PHOSPHATE 4 MG/ML
4 INJECTION, SOLUTION INTRA-ARTICULAR; INTRALESIONAL; INTRAMUSCULAR; INTRAVENOUS; SOFT TISSUE
Status: DISCONTINUED | OUTPATIENT
Start: 2020-12-16 | End: 2020-12-16 | Stop reason: HOSPADM

## 2020-12-16 RX ORDER — SODIUM CHLORIDE, SODIUM LACTATE, POTASSIUM CHLORIDE, CALCIUM CHLORIDE 600; 310; 30; 20 MG/100ML; MG/100ML; MG/100ML; MG/100ML
INJECTION, SOLUTION INTRAVENOUS CONTINUOUS
Status: DISCONTINUED | OUTPATIENT
Start: 2020-12-16 | End: 2020-12-16 | Stop reason: HOSPADM

## 2020-12-16 RX ADMIN — TRANEXAMIC ACID 1000 MG: 100 INJECTION, SOLUTION INTRAVENOUS at 09:33

## 2020-12-16 RX ADMIN — OXYCODONE HYDROCHLORIDE 5 MG: 5 SOLUTION ORAL at 10:20

## 2020-12-16 RX ADMIN — TRANEXAMIC ACID 1000 MG: 100 INJECTION, SOLUTION INTRAVENOUS at 08:46

## 2020-12-16 RX ADMIN — LIDOCAINE HYDROCHLORIDE 80 MG: 20 INJECTION, SOLUTION EPIDURAL; INFILTRATION; INTRACAUDAL; PERINEURAL at 08:48

## 2020-12-16 RX ADMIN — ACETAMINOPHEN 500 MG: 500 TABLET ORAL at 08:39

## 2020-12-16 RX ADMIN — FENTANYL CITRATE 25 MCG: 50 INJECTION, SOLUTION INTRAMUSCULAR; INTRAVENOUS at 10:35

## 2020-12-16 RX ADMIN — SODIUM CHLORIDE, POTASSIUM CHLORIDE, SODIUM LACTATE AND CALCIUM CHLORIDE: 600; 310; 30; 20 INJECTION, SOLUTION INTRAVENOUS at 09:29

## 2020-12-16 RX ADMIN — ONDANSETRON 4 MG: 2 INJECTION INTRAMUSCULAR; INTRAVENOUS at 13:46

## 2020-12-16 RX ADMIN — SODIUM CHLORIDE, POTASSIUM CHLORIDE, SODIUM LACTATE AND CALCIUM CHLORIDE: 600; 310; 30; 20 INJECTION, SOLUTION INTRAVENOUS at 08:44

## 2020-12-16 RX ADMIN — PROPOFOL 150 MG: 10 INJECTION, EMULSION INTRAVENOUS at 08:48

## 2020-12-16 RX ADMIN — FENTANYL CITRATE 50 MCG: 50 INJECTION, SOLUTION INTRAMUSCULAR; INTRAVENOUS at 08:48

## 2020-12-16 RX ADMIN — SODIUM CHLORIDE, POTASSIUM CHLORIDE, SODIUM LACTATE AND CALCIUM CHLORIDE: 600; 310; 30; 20 INJECTION, SOLUTION INTRAVENOUS at 07:34

## 2020-12-16 RX ADMIN — WATER 15 ML: 100 IRRIGANT IRRIGATION at 07:33

## 2020-12-16 RX ADMIN — CELECOXIB 200 MG: 200 CAPSULE ORAL at 08:40

## 2020-12-16 RX ADMIN — MIDAZOLAM HYDROCHLORIDE 1 MG: 1 INJECTION, SOLUTION INTRAMUSCULAR; INTRAVENOUS at 08:44

## 2020-12-16 RX ADMIN — ROPIVACAINE HYDROCHLORIDE 20 ML: 5 INJECTION, SOLUTION EPIDURAL; INFILTRATION; PERINEURAL at 08:35

## 2020-12-16 RX ADMIN — POTASSIUM CHLORIDE, DEXTROSE MONOHYDRATE AND SODIUM CHLORIDE: 150; 5; 450 INJECTION, SOLUTION INTRAVENOUS at 12:36

## 2020-12-16 RX ADMIN — MEPERIDINE HYDROCHLORIDE 12.5 MG: 25 INJECTION INTRAMUSCULAR; INTRAVENOUS; SUBCUTANEOUS at 10:24

## 2020-12-16 RX ADMIN — CEFAZOLIN 2 G: 1 INJECTION, POWDER, FOR SOLUTION INTRAVENOUS at 08:48

## 2020-12-16 RX ADMIN — SCOLOPAMINE TRANSDERMAL SYSTEM 1 PATCH: 1 PATCH, EXTENDED RELEASE TRANSDERMAL at 15:23

## 2020-12-16 RX ADMIN — EPHEDRINE SULFATE 10 MG: 50 INJECTION INTRAMUSCULAR; INTRAVENOUS; SUBCUTANEOUS at 09:11

## 2020-12-16 RX ADMIN — KETOROLAC TROMETHAMINE 15 MG: 30 INJECTION, SOLUTION INTRAMUSCULAR at 12:36

## 2020-12-16 RX ADMIN — DEXAMETHASONE SODIUM PHOSPHATE 8 MG: 4 INJECTION, SOLUTION INTRAMUSCULAR; INTRAVENOUS at 08:57

## 2020-12-16 RX ADMIN — GABAPENTIN 600 MG: 300 CAPSULE ORAL at 08:40

## 2020-12-16 RX ADMIN — ONDANSETRON 4 MG: 2 INJECTION INTRAMUSCULAR; INTRAVENOUS at 08:57

## 2020-12-16 RX ADMIN — HYDROMORPHONE HYDROCHLORIDE 0.5 MG: 2 INJECTION, SOLUTION INTRAMUSCULAR; INTRAVENOUS; SUBCUTANEOUS at 09:00

## 2020-12-16 ASSESSMENT — ACTIVITIES OF DAILY LIVING (ADL): TOILETING: INDEPENDENT

## 2020-12-16 ASSESSMENT — GAIT ASSESSMENTS
ASSISTIVE DEVICE: FRONT WHEEL WALKER
DISTANCE (FEET): 100
DEVIATION: STEP TO
GAIT LEVEL OF ASSIST: SUPERVISED

## 2020-12-16 ASSESSMENT — COGNITIVE AND FUNCTIONAL STATUS - GENERAL
PERSONAL GROOMING: A LITTLE
SUGGESTED CMS G CODE MODIFIER MOBILITY: CH
MOBILITY SCORE: 24
DRESSING REGULAR LOWER BODY CLOTHING: A LITTLE
TOILETING: A LITTLE
DAILY ACTIVITIY SCORE: 20
SUGGESTED CMS G CODE MODIFIER DAILY ACTIVITY: CJ
HELP NEEDED FOR BATHING: A LITTLE

## 2020-12-16 ASSESSMENT — PAIN DESCRIPTION - PAIN TYPE
TYPE: ACUTE PAIN
TYPE: CHRONIC PAIN

## 2020-12-16 ASSESSMENT — FIBROSIS 4 INDEX: FIB4 SCORE: 1.4

## 2020-12-16 ASSESSMENT — PAIN SCALES - GENERAL: PAIN_LEVEL: 3

## 2020-12-16 NOTE — ANESTHESIA TIME REPORT
Anesthesia Start and Stop Event Times     Date Time Event    12/16/2020 08:41 AM Ready for Procedure    12/16/2020 08:44 AM Anesthesia Start    12/16/2020 09:50 AM Anesthesia Stop        Responsible Staff  12/16/20    Name Role Begin End    Pauly Bradley M.D. Anesthesiologist 12/16/20 08:44 AM 12/16/20 09:50 AM        Preop Diagnosis (Free Text):  Pre-op Diagnosis     UNILATERAL PRIMARY OSTEOARTHRITIS RIGHT KNEE        Preop Diagnosis (Codes):    Post op Diagnosis  Unilateral primary osteoarthritis, right knee      Premium Reason  Non-Premium    Comments: Right TKA

## 2020-12-16 NOTE — OP REPORT
DIAGNOSIS: right knee osteoarthritis.    PROCEDURE: Total knee arthroplasty, right side.    ANESTHESIA: General.    COMPLICATIONS: None.    SURGEON: Hugh Grover MD    ASSISTANT: Casi Salguero    INDICATIONS: This is a patient with severe pain secondary to osteoarthritis having failed conservative treatments.    DESCRIPTION OF PROCEDURE: Patient was identified in the preop area, site was   marked, taken back to the operating room and underwent general anesthesia.   The right lower extremity was prepped and draped in sterile manner.   Preoperative timeout was held and antibiotics were given. Incision was made   over the anterior knee, soft tissue was dissected down to the fascia. The   fascia was split in medial parapatellar approach. The step drill was placed,   cut was made at 6 degrees and then a femoral cut guide was placed. All cuts   were made for the 4. The tibia was cut and sized to the 3. The   patella was cut and sized to a 32. All the trials were removed and then the   4 CR J2 S&N femur,  3 tibia and 32 ovalpatellar button were all cemented into  place. Final trialing showed that a 10 poly provided stability throughout   the entire arc of motion and 10 poly was placed. The wound was soaked with   dilute Betadine solution and was injected with Marcaine. Vicryl was used for   the fascia, Monocryl, soft tissue, skin and Dermabond for the final skin   layer. Patient was woken up, taken back to PACU, will be weightbear as   tolerated.

## 2020-12-16 NOTE — OR NURSING
0949 To PACU from OR via bed,side rails up x 3 for safety, lungs clear bilaterally, scds on patient and machine operational, pt breathing easy and unlabored with OPA in place, does not arouse to voice or touch, +1 R pedal pulse with pink/warm toes and <3 sec cap refill. Knee control locked straight on bed.   1000 Pt arouses spontaneously and OPA removed without difficulty by RN. Breathing remains easy and unlabored. Pt reports some pain to R knee.   1015 Pt remains awake and c/o 3-4/10 posterior R knee pain that is not quite tolerable. Denies nausea; tolerating sips of water.   1025 Warm blankets given but post op shivering continues; pt denies feeling cold. Denies nausea hx post op; will give IV Demerol for comfort.   1040 Pt reports pain as 5/10 but shivering has resolved. Pt tolerating sips of water. Will give juice for ERAS.   1051 Report to JIMMY Yates

## 2020-12-16 NOTE — ANESTHESIA POSTPROCEDURE EVALUATION
Patient: Deneen Temple    Procedure Summary     Date: 12/16/20 Room / Location: [place] [address] / SURGERY [facility]    Anesthesia Start: 0844 Anesthesia Stop: 0950    Procedure: ARTHROPLASTY, KNEE, TOTAL (Right Knee) Diagnosis: (UNILATERAL PRIMARY OSTEOARTHRITIS RIGHT KNEE)    Surgeons: Hugh Grvoer M.D. Responsible Provider: Pauly Bradley M.D.    Anesthesia Type: general, peripheral nerve block ASA Status: 2          Final Anesthesia Type: general, peripheral nerve block  Last vitals  BP   Blood Pressure : 103/53    Temp   36.1 °C (96.9 °F)    Pulse   Pulse: 79   Resp   16    SpO2   100 %      Anesthesia Post Evaluation    Patient location during evaluation: PACU  Patient participation: complete - patient participated  Level of consciousness: awake and alert  Pain score: 3    Airway patency: patent  Anesthetic complications: no  Cardiovascular status: hemodynamically stable  Respiratory status: acceptable  Hydration status: euvolemic    PONV: none           Nurse Pain Score: 3 (NPRS)

## 2020-12-16 NOTE — OR NURSING
1050: Assumed care of patient. Patient resting comfortably. Pain is tolerable. No nausea. On 10L mask per eras orders.   1107: Report handed to JIMMY Shaver

## 2020-12-16 NOTE — ANESTHESIA PROCEDURE NOTES
Airway    Date/Time: 12/16/2020 8:48 AM  Performed by: Pauly Bradley M.D.  Authorized by: Pauly Bradley M.D.     Location:  OR  Urgency:  Elective  Difficult Airway: No    Indications for Airway Management:  Anesthesia      Spontaneous Ventilation: absent    Sedation Level:  Deep  Preoxygenated: Yes    Patient Position:  Sniffing  MILS Maintained Throughout: No    Mask Difficulty Assessment:  1 - vent by mask  Final Airway Type:  Supraglottic airway  Final Supraglottic Airway:  Standard LMA    SGA Size:  4  Number of Attempts at Approach:  1  Number of Other Approaches Attempted:  0

## 2020-12-16 NOTE — DISCHARGE INSTRUCTIONS
Discharge Instructions    Discharged to home by car with relative. Discharged via wheelchair, hospital escort: Yes.  Special equipment needed: Walker    Be sure to schedule a follow-up appointment with your primary care doctor or any specialists as instructed.     Discharge Plan:   Diet Plan: Discussed  Activity Level: Discussed  Confirmed Follow up Appointment: Appointment Scheduled  Confirmed Symptoms Management: Discussed  Medication Reconciliation Updated: Yes    I understand that a diet low in cholesterol, fat, and sodium is recommended for good health. Unless I have been given specific instructions below for another diet, I accept this instruction as my diet prescription.   Other diet: regular as tolerated    Special Instructions: Discharge instructions for the Orthopedic Patient    Follow up with Primary Care Physician within 2 weeks of discharge to home, regarding:  Review of medications and diagnostic testing.  Surveillance for medical complications.  Workup and treatment of osteoporosis, if appropriate.     -Is this a Hip/Knee/Shoulder Joint Replacement patient? Yes   TOTAL KNEE REPLACEMENT, AFTER-CARE GUIDELINES     These instructions provide you with information on caring for yourself and your knee after surgery. Your health care provider may also give you instructions that are more specific. Your treatment was planned and performed according to current medical practices but problems sometimes occur. Call your health care provider if you have any problems or questions.     WHAT TO EXPECT AFTER THE PROCEDURE   After your procedure, your knee will typically be stiff, sore, and bruised. This will improve over time.     Pain   · Follow your home pain management plan as discussed with your nurse and as directed by your provider.   · It is important to follow any scheduled pain medications for maximal pain relief.   · If prescribed opioid medication, the goal is to use opioids only as needed and to wean off  prescription pain medicine as soon as possible.   · Ice can be used for pain control.   · Put ice in a plastic bag.   · Place a towel between your skin and the bag.   · Leave the ice on for 20 minutes, 2-3 times a day at a minimum.   · Most patients are off the pain pills by 3 weeks. If your pain continues to be severe, follow up with your provider.     Infection   Knee joint infections occur in fewer than 2% of patients. The most common causes of infection following total knee replacement surgery are from bacteria that enter the bloodstream during dental procedures, urinary tract infections, or skin infections. These bacteria can lodge around your knee replacement and cause an infection.   · Keep the incision as clean and dry as possible.   · Always wash your hands before touching your incision.   · Avoid dental care for 3 months after surgery. Your provider may recommend taking a dose of antibiotics an hour prior to any dental procedure. After 2 years, most providers recommend antibiotics only before an extensive procedure. Ask your provider what they recommend.   · Signs and symptoms of infection include low-grade fever, redness, pain, swelling and drainage from your incision. Notify your provider IMMEDIATELY if you develop ANY of these symptoms.     Post op Disturbances   · Bowel Habits - Constipation is extremely common and caused by a combination of anesthesia, lack of mobility, dehydration and pain medicine. Use stool softeners or laxatives if necessary. It is important not to ignore this problem as bowel obstructions can be a serious complication after joint replacement surgery.   · Mood/Energy Level - Many patients experience a lack of energy and endurance for up to 2-3 months after surgery. Some people feel down and can even become depressed. This is likely due to postoperative anemia, change in activity level, lack of sleep, pain medicine and just the emotional reaction to the surgery itself that is a big  disruption in a person’s life. This usually passes. If symptoms persist, follow up with your primary care provider.  · Returning to Work - Your provider will give you specific instructions based on your profession. Generally, if you work a sedentary job requiring little standing or walking, most patients may return within 2-6 weeks. Manual labor jobs involving walking, lifting and standing may take 3-4 months. Your provider’s office can provide a release to part-time or light duty work early on in your recovery and progress you to full duty as able.   · Driving - You can begin driving once cleared by your provider, provided you are no longer taking narcotic pain medication or any other medications that impair driving. Discuss the length of time expected with your provider as returning to driving depends on things such as your vehicle, which knee was replaced (right or left), and knee motion, strength and reflexes returning appropriately.   · Avoiding falls - A fall during the first few weeks after surgery can damage your new knee and may result in a need for further surgery.  throw rugs and tack down loose carpeting. Be aware of floor hazards such as pets, small objects or uneven surfaces. Notify your provider of any falls.   · [employer] Metal Detectors - The sensitivity of metal detectors varies and it is likely that your prosthesis will cause an alarm. Inform the  of your artificial joint.     Diet   · Resume your normal diet as tolerated.   · It is important to achieve a healthy nutritional status by eating a well-balanced diet on a regular basis.   · Your provider may recommend that you take iron and vitamin supplements.   · Continue to drink plenty of fluids.     Shower/Bathing   · You may shower as soon as you get home from the hospital unless otherwise instructed.   · Keep your incision out of water to prevent infection. To keep the incision dry when showering, cover it with a plastic bag  or plastic wrap. If your bandage is waterproof, this may not be necessary. o Pat incision dry if it gets wet. Do not rub. Notify your provider.   · Do not submerge in a bath until cleared by your provider. Your staples must be out and the incision completely healed.     Dressing Change: Only change your dressing if directed by your provider.   · Wash hands.   · Open all dressing change materials.   · Remove old dressing and discard.   · Inspect incision for signs of irritation or infection including redness, increase in clear drainage, yellow/green drainage, odor and surrounding skin hot to touch. Notify your provider if present.   ·  the new dressing by one corner and lay over the incision. Be careful not to touch the inside of the dressing that will lay over the incision.   · Secure in place as instructed. Swelling/Bruising   · Swelling is normal after knee replacement and can involve the thigh, knee, calf and foot.   · Swelling can last from 3-6 months.   · To reduce swelling, elevate your leg higher than your heart while reclining. The first week you are home you should elevate your leg an equal amount of time as you are active.   · The swelling is usually worse after you go home since you are upright for longer periods of time.   · Bruising often does not appear until after you arrive home and can be quite dramatic- appearing purple, black, or green. Bruising is typically not concerning and will subside without any treatment.     Blood Clot Prevention   Your treatment plan includes multiple preventative measures to decrease the risk of blood clots in the legs (DVTs) and the less common, but serious, clots that travel to the lungs (pulmonary emboli). Most patients are at standard risk for them, but people who are at higher risk include those who have had previous clots, a family history of clotting, smoking, diabetes, obesity, advanced age, use estrogen and/or live a sedentary lifestyle.     · Signs of  blood clots in legs include - Swelling in thigh, calf or ankle that does not go down with elevation. Pain, heat and tenderness in calf, back of calf or groin area. NOTE: blood clots can occur in either leg.   · Signs of blood clots in lungs include - Sudden increased shortness of breath, sudden onset of chest pain, and localized chest pain with coughing.   · If you experience any of the above symptoms, notify your provider and seek medical attention immediately.   · You received anticoagulant therapy (blood thinners) in the hospital. Continue the prescribed blood-thinning medication at home, as directed by your provider.   · Your risk for developing a clot continues for up to 2-3 months after surgery. Avoid prolonged sitting and dehydration (long air trips and car trips). If you do take a trip during this time, please get up, move around every 1-1.5 hours, and discuss all travel plans with your provider.     Activity   Once home, stay active. The key is not to overdo it. While you can expect some good days and some bad days, you should notice a gradual improvement and a gradual increase in your endurance over the next 6 to 12 months. Exercise is a critical component of recovery, particularly during the first few weeks after surgery.     · Normal activities of daily living - Expect to resume most within 3 to 6 weeks following surgery. Some pain with activity and at night is common for several weeks after surgery. Walk as much as you like once your doctor gives permission to proceed, but remember that walking is no substitute for the exercises your doctor and physical therapist prescribe. Use a walker, crutches or cane to assist with walking until you can walk smoothly (minimal or no limp) without assistance.   · Physical Therapy Exercises - Follow your home exercise program as instructed by your physical therapist during your hospital stay. Call and set up outpatient physical therapy appointments per your provider’s  recommendations. Physical therapy after the hospital stay focuses on increasing your range of motion, strengthening your muscles and improving your gait/walking pattern. Contact your provider for the referral to outpatient physical therapy if you have not yet received this. -   · Riding a stationary bicycle can help maintain muscle tone and keep your knee flexible. Begin stationary bicycling as directed by your physical therapist or provider.   · Sexual Activity - Your provider can tell you when it safe to resume sexual activity.   · Sleeping Positions - You can safely sleep on your back, on either side, or on your stomach.   · Other Activities - Lower impact activities are preferred. Consult your provider if you have specific questions.     When to Call the Doctor   Call the provider if you experience:   · Fever over 100.5° F   · Increased pain, drainage, redness, odor or heat around the incision area   · Shaking chills   · Increased knee pain with activity and rest   · Increased pain in calf, tenderness or redness above or below the knee   · Increased swelling of calf, ankle, foot   · Sudden increased shortness of breath, sudden onset of chest pain, localized chest pain with coughing   · Incision opening   Or, if there are any questions or concerns about medications or care.     Infection statistic resource:   https://www.[employer]/contents/prosthetic-joint-infection-epidemiology-microbiology-clinical-manifestations-and-diagnosis     -Is this patient being discharged with medication to prevent blood clots?  Yes, Aspirin Aspirin, ASA oral tablets  What is this medicine?  ASPIRIN (AS pir in) is a pain reliever. It is used to treat mild pain and fever. This medicine is also used as directed by a doctor to prevent and to treat heart attacks, to prevent strokes and blood clots, and to treat arthritis or inflammation.  This medicine may be used for other purposes; ask your health care provider or pharmacist if you  have questions.  COMMON BRAND NAME(S): Aspir-Low, Aspir-Sumaya, Aspirtab, Miryam Advanced Aspirin, Miryam Aspirin, Miryam Aspirin Extra Strength, Miryam Aspirin Plus, Miryam Extra Strength, Miryam Extra Strength Plus, Miryam Genuine Aspirin, Miryam Womens Aspirin, Bufferin, Bufferin Extra Strength, Bufferin Low Dose  What should I tell my health care provider before I take this medicine?  They need to know if you have any of these conditions:  · anemia  · asthma  · bleeding problems  · child with chickenpox, the flu, or other viral infection  · diabetes  · gout  · if you frequently drink alcohol containing drinks  · kidney disease  · liver disease  · low level of vitamin K  · lupus  · smoke tobacco  · stomach ulcers or other problems  · an unusual or allergic reaction to aspirin, tartrazine dye, other medicines, dyes, or preservatives  · pregnant or trying to get pregnant  · breast-feeding  How should I use this medicine?  Take this medicine by mouth with a glass of water. Follow the directions on the package or prescription label. You can take this medicine with or without food. If it upsets your stomach, take it with food. Do not take your medicine more often than directed.  Talk to your pediatrician regarding the use of this medicine in children. While this drug may be prescribed for children as young as 12 years of age for selected conditions, precautions do apply. Children and teenagers should not use this medicine to treat chicken pox or flu symptoms unless directed by a doctor.  Patients over 65 years old may have a stronger reaction and need a smaller dose.  Overdosage: If you think you have taken too much of this medicine contact a poison control center or emergency room at once.  NOTE: This medicine is only for you. Do not share this medicine with others.  What if I miss a dose?  If you are taking this medicine on a regular schedule and miss a dose, take it as soon as you can. If it is almost time for your next  dose, take only that dose. Do not take double or extra doses.  What may interact with this medicine?  Do not take this medicine with any of the following medications:  · cidofovir  · ketorolac  · probenecid  This medicine may also interact with the following medications:  · alcohol  · alendronate  · bismuth subsalicylate  · flavocoxid  · herbal supplements like feverfew, garlic, cornelio, ginkgo biloba, horse chestnut  · medicines for diabetes or glaucoma like acetazolamide, methazolamide  · medicines for gout  · medicines that treat or prevent blood clots like enoxaparin, heparin, ticlopidine, warfarin  · other aspirin and aspirin-like medicines  · NSAIDs, medicines for pain and inflammation, like ibuprofen or naproxen  · pemetrexed  · sulfinpyrazone  · varicella live vaccine  This list may not describe all possible interactions. Give your health care provider a list of all the medicines, herbs, non-prescription drugs, or dietary supplements you use. Also tell them if you smoke, drink alcohol, or use illegal drugs. Some items may interact with your medicine.  What should I watch for while using this medicine?  If you are treating yourself for pain, tell your doctor or health care professional if the pain lasts more than 10 days, if it gets worse, or if there is a new or different kind of pain. Tell your doctor if you see redness or swelling. Also, check with your doctor if you have a fever that lasts for more than 3 days. Only take this medicine to prevent heart attacks or blood clotting if prescribed by your doctor or health care professional.  Do not take aspirin or aspirin-like medicines with this medicine. Too much aspirin can be dangerous. Always read the labels carefully.  This medicine can irritate your stomach or cause bleeding problems. Do not smoke cigarettes or drink alcohol while taking this medicine. Do not lie down for 30 minutes after taking this medicine to prevent irritation to your throat.  If you  are scheduled for any medical or dental procedure, tell your healthcare provider that you are taking this medicine. You may need to stop taking this medicine before the procedure.  This medicine may be used to treat migraines. If you take migraine medicines for 10 or more days a month, your migraines may get worse. Keep a diary of headache days and medicine use. Contact your healthcare professional if your migraine attacks occur more frequently.  What side effects may I notice from receiving this medicine?  Side effects that you should report to your doctor or health care professional as soon as possible:  · allergic reactions like skin rash, itching or hives, swelling of the face, lips, or tongue  · breathing problems  · changes in hearing, ringing in the ears  · confusion  · general ill feeling or flu-like symptoms  · pain on swallowing  · redness, blistering, peeling or loosening of the skin, including inside the mouth or nose  · signs and symptoms of bleeding such as bloody or black, tarry stools; red or dark-brown urine; spitting up blood or brown material that looks like coffee grounds; red spots on the skin; unusual bruising or bleeding from the eye, gums, or nose  · trouble passing urine or change in the amount of urine  · unusually weak or tired  · yellowing of the eyes or skin  Side effects that usually do not require medical attention (report to your doctor or health care professional if they continue or are bothersome):  · diarrhea or constipation  · headache  · nausea, vomiting  · stomach gas, heartburn  This list may not describe all possible side effects. Call your doctor for medical advice about side effects. You may report side effects to FDA at 1-289-FDA-6234.  Where should I keep my medicine?  Keep out of the reach of children.  Store at room temperature between 15 and 30 degrees C (59 and 86 degrees F). Protect from heat and moisture. Do not use this medicine if it has a strong vinegar smell.  Throw away any unused medicine after the expiration date.  NOTE: This sheet is a summary. It may not cover all possible information. If you have questions about this medicine, talk to your doctor, pharmacist, or health care provider.  © 2020 [employer] (2018-01-30 10:42:13)      · Is patient discharged on Warfarin / Coumadin?   No     Depression / Suicide Risk    As you are discharged from this [facility], it is important to learn how to keep safe from harming yourself.    Recognize the warning signs:  · Abrupt changes in personality, positive or negative- including increase in energy   · Giving away possessions  · Change in eating patterns- significant weight changes-  positive or negative  · Change in sleeping patterns- unable to sleep or sleeping all the time   · Unwillingness or inability to communicate  · Depression  · Unusual sadness, discouragement and loneliness  · Talk of wanting to die  · Neglect of personal appearance   · Rebelliousness- reckless behavior  · Withdrawal from people/activities they love  · Confusion- inability to concentrate     If you or a loved one observes any of these behaviors or has concerns about self-harm, here's what you can do:  · Talk about it- your feelings and reasons for harming yourself  · Remove any means that you might use to hurt yourself (examples: pills, rope, extension cords, firearm)  · Get professional help from the community (Mental Health, Substance Abuse, psychological counseling)  · Do not be alone:Call your Safe Contact- someone whom you trust who will be there for you.  · Call your local CRISIS HOTLINE 673-3983 or 838-667-6276  · Call your local Children's Mobile Crisis Response Team [place] (273) 098-7075 or www.[employer]  · Call the toll free National Suicide Prevention Hotlines   · National Suicide Prevention Lifeline 956-161-DVVX (0262)  · National Hope Line Network 800-SUICIDE (238-4755)

## 2020-12-16 NOTE — PROGRESS NOTES
1107 Pt awake and reports pain as tolerable to R knee rated as 3/10. Denies nausea.   1115 Pt reports pain as 3/10 and tolerable to R knee. Denies nausea; tolerating sips. Remains awake without stimulation. Meets criteria for transfer to [facility].   1130 Meets criteria for transfer to [facility]

## 2020-12-16 NOTE — ANESTHESIA PROCEDURE NOTES
Peripheral Block    Date/Time: 12/16/2020 8:35 AM  Performed by: Pauly Bradley M.D.  Authorized by: Pauly Bradley M.D.     Patient Location:  Pre-op  Start Time:  12/16/2020 8:35 AM  End Time:  12/16/2020 8:40 AM  Reason for Block: at surgeon's request and post-op pain management ONLY    patient identified, IV checked, site marked, risks and benefits discussed, surgical consent, monitors and equipment checked, pre-op evaluation and timeout performed    Patient Position:  Supine  Prep: ChloraPrep    Monitoring:  Heart rate, continuous pulse ox and cardiac monitor  Block Region:  Lower Extremity  Lower Extremity - Block Type:  Selective FEMORAL nerve block at the Adductor Canal    Laterality:  Right  Procedures: ultrasound guided  Image captured, interpreted and electronically stored.  Local Infiltration:  Lidocaine  Strength:  2 %  Dose:  5 ml  Block Type:  Single-shot  Needle Length:  100mm  Needle Gauge:  21 G  Needle Localization:  Ultrasound guidance  Injection Assessment:  Negative aspiration for heme, no paresthesia on injection, incremental injection and local visualized surrounding nerve on ultrasound  Evidence of intravascular injection: No     US Guided Right Selective Femoral Nerve Block at Adductor Canal:   US probe placed at mid-thigh level on externally rotated right leg and femur identified.  Probe directed medially until right Sartorius Muscle (SM), Femoral Artery (FA) and Saphenous Nerve (SN) identified in Adductor Canal (AC).  Needle inserted anterolateral to probe in an in plane approach into a subsartorial perivascular perineural position.  After negative aspiration, 20 ml of LA injected with ease and visualized spreading within the AC.

## 2020-12-16 NOTE — ANESTHESIA PREPROCEDURE EVALUATION
Relevant Problems   ANESTHESIA (within normal limits)      ENDO   (+) Acquired hypothyroidism      Other   (+) Arthritis   (+) Disorder of thyroid   (+) Obesity (BMI 30-39.9)       Physical Exam    Airway   Mallampati: II  TM distance: >3 FB  Neck ROM: full       Cardiovascular - normal exam  Rhythm: regular  Rate: normal  (-) murmur     Dental - normal exam           Pulmonary - normal exam  Breath sounds clear to auscultation     Abdominal    Neurological - normal exam                 Anesthesia Plan    ASA 2       Plan - general and peripheral nerve block     Peripheral nerve block will be post-op pain control  Airway plan will be LMA      Plan Factors:   Patient was not previously instructed to abstain from smoking on day of procedure.  Patient did not smoke on day of procedure.      Induction: intravenous    Postoperative Plan: Postoperative administration of opioids is intended.    Pertinent diagnostic labs and testing reviewed    Informed Consent:    Anesthetic plan and risks discussed with patient.    Use of blood products discussed with: patient whom consented to blood products.

## 2020-12-16 NOTE — PROGRESS NOTES
Received report from PACU RN, assumed pt care. Pt transferred from PACU to [facility] nick [id number]. Pt A&Ox4, reports pain in knee, will medicate per MAR. Dressing to knee CDI, +CMS, cap refill less than 3 seconds, pt able to move leg w/o difficulty. Polar ice to knee. Pt d/t void post-op by 1800. Pt taught to inform nurse if experiencing pain above comfort goal, SOB, chest pain, ambulating out of bed, or for any further assistance. Fall precautions in place, call light within reach. Will continue with care.

## 2020-12-17 NOTE — THERAPY
Occupational Therapy   Initial Evaluation     Patient Name: Deneen Temple  Age:  70 y.o., Sex:  female  Medical Record #: [id number]  Today's Date: 12/16/2020     Precautions  Precautions: Weight Bearing As Tolerated Right Lower Extremity    Assessment  Patient is 70 y.o. female admit for TKA.  Pt tolerating activity well now with nausea better controlled.  She is moving slowly out of caution, but is overall steady with transfers and short distance with FWW and supervision.  She is able to dress seated with setup and AE. Spouse avail to assist, and pt has dressing equipment avail to help at home.      Plan    Recommend Occupational Therapy for Evaluation only     DC Equipment Recommendations: None     12/16/20 3405   Prior Living Situation   Prior Services Home-Independent   Housing / Facility 1 Story House   Steps Into Home 1   Bathroom Set up Walk In Shower;Shower Chair   Equipment Owned Front-Wheel Walker;Raised Toilet Seat Without Arms;Hand Held Shower;Sock Aid;Reacher   Lives with - Patient's Self Care Capacity Spouse   Comments Spouse in fair health, pt will have some help with meals from friends from Religious   Prior Level of ADL Function   Self Feeding Independent   Grooming / Hygiene Independent   Bathing Independent   Dressing Independent   Toileting Independent   Prior Level of IADL Function   Medication Management Independent   Laundry Independent   Kitchen Mobility Independent   Finances Independent   Home Management Independent   Shopping Independent   Prior Level Of Mobility Independent Without Device in Community   Driving / Transportation Driving Independent   Occupation (Pre-Hospital Vocational) Retired Due To Age   Leisure Interests Hobbies  (friends, family)   ADL Assessment   Eating Independent   Grooming Supervision;Seated   Bathing   (educ)   Upper Body Dressing Modified Independent   Lower Body Dressing Supervision  (with use of reacher and SA)   Toileting   (reports sup with nursing,  denied need to demo)   Functional Mobility   Sit to Stand Minimal Assist  (initially, then improved to Supervised with repetition)   Bed, Chair, Wheelchair Transfer Supervised   Tub / Shower Transfers   (educ')   Transfer Method Stand Step   Mobility Sup wtih FWW short distance at bedside- up to BR with nursing   Education Group   Additional Comments Educ on safety with ADl's post TKA.  Spouse present for education, appears tired and with decreased attn span, unclear how much  will recall but pt appears more alert than she feels

## 2020-12-17 NOTE — THERAPY
Physical Therapy   Initial Evaluation     Patient Name: Deneen Temple  Age:  70 y.o., Sex:  female  Medical Record #: [id number]  Today's Date: 12/16/2020     Precautions: (P) Weight Bearing As Tolerated Right Lower Extremity    Assessment  Patient is 70 y.o. female with a diagnosis of R TKR Pt lives at home with  and is active,Pt is safe with transfers,ambulation and stairs,she understands HEP and has no equipment needs     Plan    Recommend Physical Therapy for Evaluation only      12/16/20 1654   Prior Living Situation   Prior Services Home-Independent   Housing / Facility 1 Story House   Steps Into Home 1   Equipment Owned Front-Wheel Walker   Lives with - Patient's Self Care Capacity Spouse   Prior Level of Functional Mobility   Bed Mobility Independent   Transfer Status Independent   Ambulation Independent   Distance Ambulation (Feet)   (community amb)   Assistive Devices Used None   Stairs Independent   Passive ROM Lower Body   Rt Knee Flexion Degrees 90   Rt Knee Extension Degrees 0   Balance Assessment   Sitting Balance (Static) Good   Sitting Balance (Dynamic) Good   Standing Balance (Static) Good   Standing Balance (Dynamic) Good   Weight Shift Sitting Good   Weight Shift Standing Good   Gait Analysis   Gait Level Of Assist Supervised   Assistive Device Front Wheel Walker   Distance (Feet) 100   # of Times Distance was Traveled 2   Deviation Step To   # of Stairs Climbed 3   Level of Assist with Stairs Supervised   Weight Bearing Status wbat R   Bed Mobility    Supine to Sit Modified Independent   Sit to Supine Supervised   Scooting Modified Independent   Functional Mobility   Sit to Stand Supervised   Bed, Chair, Wheelchair Transfer Supervised   Transfer Method Stand Step   Activity Tolerance   Sitting Edge of Bed 10   Standing 10   Patient / Family Goals    Patient / Family Goal #1 Home   Anticipated Discharge Equipment and Recommendations   DC Equipment Recommendations None   Discharge  Recommendations Recommend outpatient physical therapy services to address higher level deficits       DC Equipment Recommendations: (P) None  Discharge Recommendations: (P) Recommend outpatient physical therapy services to address higher level deficits

## 2020-12-17 NOTE — PROGRESS NOTES
Discharging patient home per MD order.  Pt demonstrated understanding of discharge instructions, follow up appointments, home medications, prescriptions. Ambulating with assistance of FWW, voiding without difficulty, pain well controlled, tolerating oral medications, tolerating diet. Pt verbalized understanding of discharge instructions and educational handouts, all questions answered.  Pt discharged off unit with hospital escort at 1740.

## 2021-01-18 DIAGNOSIS — M47.816 OSTEOARTHRITIS OF LUMBAR SPINE, UNSPECIFIED SPINAL OSTEOARTHRITIS COMPLICATION STATUS: ICD-10-CM

## 2021-01-18 RX ORDER — PIROXICAM 10 MG/1
10 CAPSULE ORAL 2 TIMES DAILY WITH MEALS
Qty: 180 CAP | Refills: 3 | Status: SHIPPED | OUTPATIENT
Start: 2021-01-18 | End: 2022-03-22 | Stop reason: SDUPTHER

## 2021-01-18 NOTE — TELEPHONE ENCOUNTER
Requested Prescriptions     Pending Prescriptions Disp Refills   • piroxicam (FELDENE) 10 MG Cap 180 Cap 3     Sig: Take 1 Cap by mouth 2 times a day, with meals.       Stacey Mendez A.P.R.N.

## 2021-01-18 NOTE — TELEPHONE ENCOUNTER
Received request via: Pharmacy    Was the patient seen in the last year in this department? Yes 10/22/20    Does the patient have an active prescription (recently filled or refills available) for medication(s) requested? No

## 2021-02-09 ENCOUNTER — OFFICE VISIT (OUTPATIENT)
Dept: MEDICAL GROUP | Facility: PHYSICIAN GROUP | Age: 71
End: 2021-02-09
Payer: MEDICARE

## 2021-02-09 VITALS
HEART RATE: 83 BPM | BODY MASS INDEX: 33.29 KG/M2 | SYSTOLIC BLOOD PRESSURE: 120 MMHG | HEIGHT: 64 IN | TEMPERATURE: 97.9 F | DIASTOLIC BLOOD PRESSURE: 70 MMHG | OXYGEN SATURATION: 96 % | RESPIRATION RATE: 14 BRPM | WEIGHT: 195 LBS

## 2021-02-09 DIAGNOSIS — E55.9 VITAMIN D DEFICIENCY: ICD-10-CM

## 2021-02-09 DIAGNOSIS — E03.9 ACQUIRED HYPOTHYROIDISM: ICD-10-CM

## 2021-02-09 DIAGNOSIS — Z12.31 ENCOUNTER FOR SCREENING MAMMOGRAM FOR BREAST CANCER: ICD-10-CM

## 2021-02-09 DIAGNOSIS — Z96.651 STATUS POST RIGHT KNEE REPLACEMENT: ICD-10-CM

## 2021-02-09 DIAGNOSIS — E78.2 MIXED HYPERLIPIDEMIA: ICD-10-CM

## 2021-02-09 DIAGNOSIS — Z00.00 ROUTINE HEALTH MAINTENANCE: ICD-10-CM

## 2021-02-09 DIAGNOSIS — E66.9 OBESITY (BMI 30-39.9): ICD-10-CM

## 2021-02-09 PROCEDURE — 8041 PR SCP AHA: Performed by: NURSE PRACTITIONER

## 2021-02-09 PROCEDURE — 99214 OFFICE O/P EST MOD 30 MIN: CPT | Performed by: NURSE PRACTITIONER

## 2021-02-09 RX ORDER — HYDROCODONE BITARTRATE AND ACETAMINOPHEN 5; 325 MG/1; MG/1
TABLET ORAL
COMMUNITY
Start: 2021-01-21 | End: 2021-07-13

## 2021-02-09 ASSESSMENT — PATIENT HEALTH QUESTIONNAIRE - PHQ9: CLINICAL INTERPRETATION OF PHQ2 SCORE: 0

## 2021-02-09 ASSESSMENT — FIBROSIS 4 INDEX: FIB4 SCORE: 1.4

## 2021-02-09 NOTE — ASSESSMENT & PLAN NOTE
Chronic, ongoing. She is participating in PT for her knee twice a week. Due for labs in June 2021.

## 2021-02-09 NOTE — ASSESSMENT & PLAN NOTE
Patient is 8 weeks post op for right knee replacement. Pain is currently uncontrolled and she is having trouble sleeping. She also has nerve sensitivity. Right now her pain is 3/10. She is taking 5-325 mg Hydrocodone/acetaminophen as needed. She states that when she was taking 1/4 of the  mg hydrocodone pill, her pain was better controlled than 1/2 of a 5-325 mg pill. She will speak to a pharmacist about this. She states that it is not lasting long enough during the night. She is doing PT 2/week and enjoying it. She is no longer using a cane.  She is trying not to limp and is walking slowly. Her incision is healing well.     She also states that she has pain in her left heel. It is tender. She will make an appointment with podiatry.

## 2021-02-09 NOTE — PROGRESS NOTES
CC:   Chief Complaint   Patient presents with   • Procedure     FV, right knee surgery     HISTORY OF THE PRESENT ILLNESS: Patient is a 70 y.o. female. This pleasant patient is here today to follow up after right knee replacement.    Health Maintenance: Reviewed  Annual Health Assessment Questions:    1.  Are you currently engaging in any exercise or physical activity? Yes    2.  How would you describe your mood or emotional well-being today? fair    3.  Have you had any falls in the last year? No    4.  Have you noticed any problems with your balance or had difficulty walking? No    5.  In the last six months have you experienced any leakage of urine? No    6. DPA/Advanced Directive: Patient has Advanced Directive on file.     Status post right knee replacement  Patient is 8 weeks post op for right knee replacement. Pain is currently uncontrolled and she is having trouble sleeping. She also has nerve sensitivity. Right now her pain is 3/10. She is taking 5-325 mg Hydrocodone/acetaminophen as needed. She states that when she was taking 1/4 of the  mg hydrocodone pill, her pain was better controlled than 1/2 of a 5-325 mg pill. She will speak to a pharmacist about this. She states that it is not lasting long enough during the night. She is doing PT 2/week and enjoying it. She is no longer using a cane.  She is trying not to limp and is walking slowly. Her incision is healing well.     She also states that she has pain in her left heel. It is tender. She will make an appointment with podiatry.     Acquired hypothyroidism  Chronic, ongoing. She is taking levothyroxine 50 mcg daily. Due labs in June 2021.    Mixed hyperlipidemia  Chronic, ongoing. She is taking pravastatin 40 mg daily. Due for updated labs in June 2021.    Obesity (BMI 30-39.9)  Chronic, ongoing. She is participating in PT for her knee twice a week. Due for labs in June 2021.    Vitamin D deficiency  Chronic, ongoing. She is taking vitamin D 1000  un daily. Due for updated labs.    Allergies: Meloxicam  Current Outpatient Medications Ordered in Epic   Medication Sig Dispense Refill   • HYDROcodone-acetaminophen (NORCO) 5-325 MG Tab per tablet      • piroxicam (FELDENE) 10 MG Cap Take 1 Cap by mouth 2 times a day, with meals. 180 Cap 3   • aspirin 81 MG EC tablet Take 1 Tab by mouth 2 times a day, with meals. 90 Tab 0   • pravastatin (PRAVACHOL) 40 MG tablet TAKE ONE TABLET BY MOUTH ONCE DAILY AT BEDTIME 100 Tab 2   • HYDROcodone/acetaminophen (NORCO)  MG Tab Take 1-2 Tabs by mouth every 6 hours as needed.     • econazole nitrate 1 % cream Apply  to affected area(s) 2 times a day.     • alendronate (FOSAMAX) 70 MG Tab Take 1 Tab by mouth every 7 days. On an empty stomach with a full glass of water, remain upright for 30-60 mins 12 Tab 3   • tizanidine (ZANAFLEX) 4 MG Tab Take 1 Tab by mouth every 6 hours as needed. For muscle spasm 30 Tab 3   • levothyroxine (SYNTHROID) 50 MCG Tab Take 1 Tab by mouth Every morning on an empty stomach. 90 Tab 3   • triamcinolone (KENALOG) 0.1 % lotion APPLY TO SCALP AFTER SHAMPOOING WITH SELSUN BLUE SHAMPOO 60 mL 2   • vitamin D3, cholecalciferol, 1000 UNIT Tab Take 1 Tab by mouth every day. 100 Tab 3   • ascorbic acid (ASCORBIC ACID) 500 MG Tab Take 1,000 mg by mouth every day.     • BETA CAROTENE PO Take 10,000 Int'l Units by mouth every day.     • B Complex-C-Folic Acid (STRESS B COMPLEX PO) Take  by mouth 2 Times a Day.     • Calcium 1500 MG TABS Take  by mouth every day.     • Magnesium 500 MG CAPS Take 875 mg by mouth every evening.     • Potassium (POTASSIMIN PO) Take 45 mg by mouth every day.       No current [facility]-ordered facility-administered medications on file.      Past Medical History:   Diagnosis Date   • Arthritis     osteo - fingers, knees, back   • Disorder of thyroid     hypoactive   • Family history of diabetes mellitus 2/17/2015   • High cholesterol    • Hyperlipidemia    • Pain 4/22/16    low  back-chronic   • Pain 1/27/17    right knee   • Personal history of colonic polyps 2/12/2020    Goes to [facility].   • Screening mammogram for high-risk patient 10/7/2019     Past Surgical History:   Procedure Laterality Date   • PB TOTAL KNEE ARTHROPLASTY Right 12/16/2020    Procedure: ARTHROPLASTY, KNEE, TOTAL;  Surgeon: Hugh Grover M.D.;  Location: [facility];  Service: Orthopedics   • KNEE ARTHROSCOPY Right 1/30/2017    Procedure: KNEE ARTHROSCOPY;  Surgeon: Will Mattson M.D.;  Location: [facility];  Service:    • MEDIAL MENISCECTOMY  1/30/2017    Procedure: MEDIAL MENISCECTOMY - PARTIAL;  Surgeon: Will Mattson M.D.;  Location: [facility];  Service:    • TRIGGER FINGER RELEASE Left 4/27/2016    Procedure: TRIGGER FINGER RELEASE - THUMB;  Surgeon: Bhavin Barker M.D.;  Location: [facility];  Service:    • DEQUERVAINS RELEASE Right 3/2015    wrist   • COLONOSCOPY  2009   • SHOULDER SURGERY Left 2008    removal of bone spur left shoulder     Social History     Tobacco Use   • Smoking status: Never Smoker   • Smokeless tobacco: Never Used   Substance Use Topics   • Alcohol use: No   • Drug use: No     Social History     Social History Narrative   • Not on file     Family History   Problem Relation Age of Onset   • Stroke Father    • Diabetes Father    • Asthma Mother    • Heart Attack Mother 55   • Obesity Mother    • No Known Problems Brother    • No Known Problems Maternal Grandmother    • Cancer Maternal Grandfather    • No Known Problems Paternal Grandmother    • No Known Problems Paternal Grandfather    • Cancer Brother         throat cancer     ROS:   Constitutional: No fevers, chills, malaise/fatigue.  Eyes: No eye pain.  ENT: No sore throat, congestion.   Resp: No cough, shortness of breath.  CV: No chest pain, leg swelling, palpitations.  GI: No nausea/vomiting, abdominal pain, constipation, diarrhea.  : No dysuria,  "hematuria.  MSK: + Right knee pain.  Neuro: No dizziness, weakness, headaches.  Psych: No suicidal ideations.    All remaining systems reviewed and found to be negative, except as stated above.        Exam: /70 (BP Location: Left arm, Patient Position: Sitting, BP Cuff Size: Adult)   Pulse 83   Temp 36.6 °C (97.9 °F) (Temporal)   Resp 14   Ht 1.626 m (5' 4\")   Wt 88.5 kg (195 lb)   SpO2 96%  Body mass index is 33.47 kg/m².    General:  Normal appearing. No distress.  HEENT:  Normocephalic. Eyes conjunctiva clear lids without ptosis, pupils equal and reactive to light accommodation, ears normal shape and contour, canals are clear bilaterally, tympanic membranes are benign, nasal mucosa benign, oropharynx is without erythema, edema or exudates. Sinuses (frontal and maxillary) nontender to palpation.  Neck:  Supple without JVD or bruit. Thyroid is not enlarged.  Pulmonary:  Clear to ausculation.  Normal effort. No rales, ronchi, or wheezing.  Cardiovascular:  Regular rate and rhythm without murmur. Carotid and radial pulses are intact and equal bilaterally.  Abdomen: Obese. Soft, nontender, nondistended. Normal bowel sounds. Liver and spleen are not palpable.  Neurologic:  Grossly nonfocal.  Lymph:  No cervical, supraclavicular lymph nodes are palpable.  Skin: Right knee incision well approximated. No s/s infection. Warm and dry.    Musculoskeletal:  Slow gait. No extremity cyanosis, clubbing, or edema.  Psych:  Normal mood and affect. Alert and oriented x3. Judgment and insight is normal.    Assessment/Plan:  1. Status post right knee replacement  Chronic, ongoing.  Patient is 8 weeks postop.  Continue follow-up with orthopedics and physical therapy twice weekly.  Continue pain medications as prescribed by Ortho.    2. Obesity (BMI 30-39.9)  Chronic, ongoing.  Encouraged healthy diet, exercise as tolerated, weight loss.  Due for updated annual labs in June 2021.  - CBC WITH DIFFERENTIAL; Future  - Comp " Metabolic Panel; Future  - Lipid Profile; Future    3. Acquired hypothyroidism  Chronic, stable.  Continue levothyroxine 50 mcg/day, does not need a refill at this time.  Due for updated annual labs in June 2021.  - TSH WITH REFLEX TO FT4; Future    4. Mixed hyperlipidemia  Chronic, ongoing.  Continue pravastatin 40 mg daily, does not need a refill at this time.  Due for updated annual labs in June 2021.  - Comp Metabolic Panel; Future  - Lipid Profile; Future    5. Vitamin D deficiency  Chronic, ongoing.  Continue over-the-counter vitamin D3 supplement daily.  Due for updated annual labs in June 2021.  - VITAMIN D,25 HYDROXY; Future    6. Encounter for screening mammogram for breast cancer  Due for screening.  - MA-SCREENING MAMMO BILAT W/TOMOSYNTHESIS W/CAD; Future    7. Routine health maintenance  - MA-SCREENING MAMMO BILAT W/TOMOSYNTHESIS W/CAD; Future  - CBC WITH DIFFERENTIAL; Future  - Comp Metabolic Panel; Future  - Lipid Profile; Future  - MICROALBUMIN CREAT RATIO URINE; Future  - TSH WITH REFLEX TO FT4; Future  - VITAMIN D,25 HYDROXY; Future     Educated in proper administration of medication(s) ordered today including safety, possible SE, risks, benefits, rationale and alternatives to therapy.   Supportive care, differential diagnoses, and indications for immediate follow-up discussed with patient.    Pathogenesis of diagnosis discussed including typical length and natural progression.    Instructed to return to clinic or nearest emergency department for any change in condition, further concerns, or worsening of symptoms.  Patient states understanding of the plan of care and discharge instructions.    Return in about 4 months (around 6/21/2021) for HC/PCP Annual Wellness Visit- Medicare, Follow up Labs.    Please note that this dictation was created using voice recognition software. I have made every reasonable attempt to correct obvious errors, but I expect that there are errors of grammar and possibly  content that I did not discover before finalizing the note.

## 2021-03-03 DIAGNOSIS — Z23 NEED FOR VACCINATION: ICD-10-CM

## 2021-04-05 DIAGNOSIS — N89.8 VAGINAL ITCHING: ICD-10-CM

## 2021-04-05 RX ORDER — NYSTATIN 100000 [USP'U]/G
POWDER TOPICAL
Qty: 60 G | Refills: 0 | Status: SHIPPED | OUTPATIENT
Start: 2021-04-05 | End: 2021-04-06 | Stop reason: SDUPTHER

## 2021-04-05 NOTE — TELEPHONE ENCOUNTER
Received request via: Patient    Was the patient seen in the last year in this department? Yes 2/9/21    Does the patient have an active prescription (recently filled or refills available) for medication(s) requested? No

## 2021-04-06 DIAGNOSIS — N89.8 VAGINAL ITCHING: ICD-10-CM

## 2021-04-06 RX ORDER — NYSTATIN 100000 [USP'U]/G
POWDER TOPICAL
Qty: 60 G | Refills: 0 | Status: SHIPPED | OUTPATIENT
Start: 2021-04-06 | End: 2022-04-22 | Stop reason: SDUPTHER

## 2021-04-06 NOTE — TELEPHONE ENCOUNTER
Requested Prescriptions     Pending Prescriptions Disp Refills   • nystatin (MYCOSTATIN) powder 60 g 0     Sig: Apply to affected area TID for 7 days       TUSHAR HdezRYaquelinN.

## 2021-04-06 NOTE — PROGRESS NOTES
1. Vaginal itching  Requested Prescriptions     Signed Prescriptions Disp Refills   • nystatin (MYCOSTATIN) powder 60 g 0     Sig: Apply to affected area TID for 7 days       Stacey Mendez A.P.R.N.

## 2021-05-27 ENCOUNTER — HOSPITAL ENCOUNTER (OUTPATIENT)
Dept: LAB | Facility: MEDICAL CENTER | Age: 71
End: 2021-05-27
Attending: NURSE PRACTITIONER
Payer: MEDICARE

## 2021-05-27 ENCOUNTER — TELEPHONE (OUTPATIENT)
Dept: MEDICAL GROUP | Facility: PHYSICIAN GROUP | Age: 71
End: 2021-05-27

## 2021-05-27 DIAGNOSIS — E78.2 MIXED HYPERLIPIDEMIA: ICD-10-CM

## 2021-05-27 DIAGNOSIS — E66.9 OBESITY (BMI 30-39.9): ICD-10-CM

## 2021-05-27 DIAGNOSIS — Z00.00 ROUTINE HEALTH MAINTENANCE: ICD-10-CM

## 2021-05-27 DIAGNOSIS — E03.9 ACQUIRED HYPOTHYROIDISM: ICD-10-CM

## 2021-05-27 DIAGNOSIS — E55.9 VITAMIN D DEFICIENCY: ICD-10-CM

## 2021-05-27 LAB
ALBUMIN SERPL BCP-MCNC: 4 G/DL (ref 3.2–4.9)
ALBUMIN/GLOB SERPL: 1.4 G/DL
ALP SERPL-CCNC: 76 U/L (ref 30–99)
ALT SERPL-CCNC: 14 U/L (ref 2–50)
ANION GAP SERPL CALC-SCNC: 9 MMOL/L (ref 7–16)
AST SERPL-CCNC: 20 U/L (ref 12–45)
BASOPHILS # BLD AUTO: 0.7 % (ref 0–1.8)
BASOPHILS # BLD: 0.04 K/UL (ref 0–0.12)
BILIRUB SERPL-MCNC: 0.7 MG/DL (ref 0.1–1.5)
BUN SERPL-MCNC: 21 MG/DL (ref 8–22)
CALCIUM SERPL-MCNC: 9.1 MG/DL (ref 8.5–10.5)
CHLORIDE SERPL-SCNC: 107 MMOL/L (ref 96–112)
CHOLEST SERPL-MCNC: 168 MG/DL (ref 100–199)
CO2 SERPL-SCNC: 25 MMOL/L (ref 20–33)
CREAT SERPL-MCNC: 1.08 MG/DL (ref 0.5–1.4)
CREAT UR-MCNC: 349.1 MG/DL
EOSINOPHIL # BLD AUTO: 0.12 K/UL (ref 0–0.51)
EOSINOPHIL NFR BLD: 2.2 % (ref 0–6.9)
ERYTHROCYTE [DISTWIDTH] IN BLOOD BY AUTOMATED COUNT: 44.2 FL (ref 35.9–50)
FASTING STATUS PATIENT QL REPORTED: NORMAL
GLOBULIN SER CALC-MCNC: 2.8 G/DL (ref 1.9–3.5)
GLUCOSE SERPL-MCNC: 91 MG/DL (ref 65–99)
HCT VFR BLD AUTO: 47.7 % (ref 37–47)
HDLC SERPL-MCNC: 52 MG/DL
HGB BLD-MCNC: 15.4 G/DL (ref 12–16)
IMM GRANULOCYTES # BLD AUTO: 0.01 K/UL (ref 0–0.11)
IMM GRANULOCYTES NFR BLD AUTO: 0.2 % (ref 0–0.9)
LDLC SERPL CALC-MCNC: 88 MG/DL
LYMPHOCYTES # BLD AUTO: 1.38 K/UL (ref 1–4.8)
LYMPHOCYTES NFR BLD: 24.7 % (ref 22–41)
MCH RBC QN AUTO: 27.6 PG (ref 27–33)
MCHC RBC AUTO-ENTMCNC: 32.3 G/DL (ref 33.6–35)
MCV RBC AUTO: 85.5 FL (ref 81.4–97.8)
MICROALBUMIN UR-MCNC: 1.6 MG/DL
MICROALBUMIN/CREAT UR: 5 MG/G (ref 0–30)
MONOCYTES # BLD AUTO: 0.47 K/UL (ref 0–0.85)
MONOCYTES NFR BLD AUTO: 8.4 % (ref 0–13.4)
NEUTROPHILS # BLD AUTO: 3.56 K/UL (ref 2–7.15)
NEUTROPHILS NFR BLD: 63.8 % (ref 44–72)
NRBC # BLD AUTO: 0 K/UL
NRBC BLD-RTO: 0 /100 WBC
PLATELET # BLD AUTO: 238 K/UL (ref 164–446)
PMV BLD AUTO: 10.3 FL (ref 9–12.9)
POTASSIUM SERPL-SCNC: 4.9 MMOL/L (ref 3.6–5.5)
PROT SERPL-MCNC: 6.8 G/DL (ref 6–8.2)
RBC # BLD AUTO: 5.58 M/UL (ref 4.2–5.4)
SODIUM SERPL-SCNC: 141 MMOL/L (ref 135–145)
TRIGL SERPL-MCNC: 139 MG/DL (ref 0–149)
TSH SERPL DL<=0.005 MIU/L-ACNC: 3.07 UIU/ML (ref 0.38–5.33)
WBC # BLD AUTO: 5.6 K/UL (ref 4.8–10.8)

## 2021-05-27 PROCEDURE — 82570 ASSAY OF URINE CREATININE: CPT

## 2021-05-27 PROCEDURE — 80053 COMPREHEN METABOLIC PANEL: CPT

## 2021-05-27 PROCEDURE — 82043 UR ALBUMIN QUANTITATIVE: CPT

## 2021-05-27 PROCEDURE — 85025 COMPLETE CBC W/AUTO DIFF WBC: CPT

## 2021-05-27 PROCEDURE — 84443 ASSAY THYROID STIM HORMONE: CPT

## 2021-05-27 PROCEDURE — 80061 LIPID PANEL: CPT

## 2021-05-27 PROCEDURE — 82306 VITAMIN D 25 HYDROXY: CPT

## 2021-05-27 PROCEDURE — 36415 COLL VENOUS BLD VENIPUNCTURE: CPT

## 2021-05-27 NOTE — TELEPHONE ENCOUNTER
Future Appointments       Provider Department Center    6/2/2021 2:30 PM ADENIKE Hdez [facility] [place] [facility]    6/16/2021 11:00 AM [facility]ISTA MG 1 [facility] [place] [facility]    6/16/2021 11:35 AM [facility] BD [facility] [place] [facility]        ANNUAL WELLNESS VISIT PRE-VISIT PLANNING    1.  Reviewed notes from the last office visit: Yes, LOV 2/9/21    2.  If any orders were ordered or intended to be done prior to visit (i.e. 6 mos follow-up), do we have results/consult notes or has patient scheduled?        •  Labs - Labs ordered, completed on 05/27/21 and results are in chart.  Note: If patient appointment is for lab review and patient did not complete labs, check with provider if OK to reschedule patient until labs completed.       •  Imaging - Imaging ordered, NOT completed. Patient advised to complete prior to next appointment. Patient has Mammo scheduled for 06/16/21       •  Referrals - No referrals were ordered at last office visit.    3.  Immunizations were updated in Epic using Reconcile Outside Information activity? No, nothing in [employer] for this patient       •  Is patient due for Tdap? NO       •  Is patient due for Shingrix? NO    4.  Patient is due for the following Health Maintenance Topics:   Health Maintenance Due   Topic Date Due   • Annual Wellness Visit  Never done   • COVID-19 Vaccine (1) Never done   • BONE DENSITY  03/21/2020   • MAMMOGRAM  11/01/2020       - Patient has completed TDAP and SHINGRIX (Shingles) Immunization(s) per EPIC. Chart has been updated.    5.  Reviewed/Updated the following with patient:       •   Preferred Pharmacy? Yes       •   Preferred Lab? Yes       •   Preferred Communication? Yes       •   Allergies? Yes       •   Medications? YES. Was Abstract Encounter opened and chart updated? YES       •   Social History? Yes       •   Family History (document living status of immediate family members and if + hx of  cancer, diabetes,  hypertension, hyperlipidemia, heart attack, stroke) Yes    6.  Care Team Updated:       •   DME Company (gait device, O2, CPAP, etc.): NO       •   Other Specialists (eye doctor, derm, GYN, cardiology, endo, etc): YES    7.  Patient was advised: “This is a free wellness visit. The provider will screen for medical conditions to help you stay healthy. If you have other concerns to address you may be asked to discuss these at a separate visit or there may be an additional fee.”     8.  AHA (Puls8) form printed for Provider? N/A  Patient advised to arrive 15 minutes prior to scheduled appointment

## 2021-05-29 LAB — 25(OH)D3 SERPL-MCNC: 62 NG/ML (ref 30–80)

## 2021-06-02 ENCOUNTER — OFFICE VISIT (OUTPATIENT)
Dept: MEDICAL GROUP | Facility: PHYSICIAN GROUP | Age: 71
End: 2021-06-02
Payer: MEDICARE

## 2021-06-02 VITALS
OXYGEN SATURATION: 96 % | HEART RATE: 73 BPM | BODY MASS INDEX: 32.78 KG/M2 | TEMPERATURE: 96.7 F | HEIGHT: 64 IN | DIASTOLIC BLOOD PRESSURE: 64 MMHG | SYSTOLIC BLOOD PRESSURE: 110 MMHG | WEIGHT: 192 LBS

## 2021-06-02 DIAGNOSIS — L21.9 SEBORRHEIC DERMATITIS OF SCALP: ICD-10-CM

## 2021-06-02 DIAGNOSIS — M79.672 PAIN OF LEFT HEEL: ICD-10-CM

## 2021-06-02 DIAGNOSIS — R19.7 DIARRHEA, UNSPECIFIED TYPE: ICD-10-CM

## 2021-06-02 DIAGNOSIS — N18.31 STAGE 3A CHRONIC KIDNEY DISEASE: ICD-10-CM

## 2021-06-02 PROCEDURE — 99214 OFFICE O/P EST MOD 30 MIN: CPT | Performed by: NURSE PRACTITIONER

## 2021-06-02 RX ORDER — TRIAMCINOLONE ACETONIDE 1 MG/ML
LOTION TOPICAL
Qty: 60 ML | Refills: 5 | Status: SHIPPED | OUTPATIENT
Start: 2021-06-02

## 2021-06-02 ASSESSMENT — PATIENT HEALTH QUESTIONNAIRE - PHQ9: CLINICAL INTERPRETATION OF PHQ2 SCORE: 0

## 2021-06-02 ASSESSMENT — ACTIVITIES OF DAILY LIVING (ADL): BATHING_REQUIRES_ASSISTANCE: 0

## 2021-06-02 ASSESSMENT — ENCOUNTER SYMPTOMS: GENERAL WELL-BEING: GOOD

## 2021-06-02 ASSESSMENT — FIBROSIS 4 INDEX: FIB4 SCORE: 1.572124952426025792

## 2021-06-02 NOTE — ASSESSMENT & PLAN NOTE
"New problem to examiner.  Patient was seen by a nurse practitioner at her podiatrist office who believes that her left heel pain was related to plantar fasciitis.  Patient states that she does have plantar fasciitis, has done exercises for plantar fasciitis, and has an orthotic.  States that she is not having pain first up in the morning and exercises have worsened her pain not improve them.  She followed up with the podiatrist a week ago who told her that it is not plantar fasciitis but heel pad syndrome.  Patient states that the pain is located on the bottom of her left heel and worsens with walking or weightbearing.  She was on her feet 2-1/2 hours a day and her heel is \"killing her\".  Directions from podiatrist were to stop wearing orthotics, continue doing plantar fasciitis exercises but more gently, if symptoms do not improve after 2 weeks the plan will be to complete an MRI.  The patient believes that her symptoms are consistent with bursitis and believes that a steroid injection would likely improve her pain.  Due to follow-up with podiatrist in 1 week.  "

## 2021-06-02 NOTE — PROGRESS NOTES
"CC:   Chief Complaint   Patient presents with   • Annual Wellness Visit   • Lab Results     HISTORY OF THE PRESENT ILLNESS: Patient is a 71 y.o. female. This pleasant patient is here today to review lab results, discussed diarrhea and left heel pain, and request medication refill.    Health Maintenance: Completed    Diarrhea  New problem to examiner.  Patient reports that she had her knee surgery 5-1/2 months ago.  After her surgery, she was taking hydrocodone as needed for pain, states that she took the medication without food for about 1 week which caused nausea and a poor appetite.  Patient states that she never had constipation when taking the pain medications.  Since this, she has been experiencing watery to loose stools 2-3 times per day.  She has not tried increasing fiber in her diet or using over-the-counter fiber supplements or probiotics.  She has not tried any over-the-counter antidiarrheals.  She did try discontinuing piroxicam temporarily, which she usually does monthly due to kidney function, and symptoms seemed to improve without the medication but not resolved.  Patient reports that she has lower abdominal/pelvic symptoms that \"feel funny\" which are a signal to her that she needs to go to the bathroom.  She denies any abdominal pain, gas, excessive bloating, blood in her stools, nausea or vomiting.    Seborrheic dermatitis of scalp  New problem to examiner, chronic problem for the patient.  Patient continues to use triamcinolone 0.1% lotion applied to her scalp after shampooing with Selsun Blue shampoo every other day to every 3 to 4 days as needed for scalp itching.  Requesting medication refill.    Stage 3a chronic kidney disease (HCC)  New problem to examiner, chronic problem for the patient. Most recent GFR decreased at 50, greater than 60 in December 2020 and 53 in June 2020.  Patient does continue piroxicam 10 mg twice daily, continues to withhold the medication a few days per month due to " "kidney function.  She has been experiencing loose and watery stools for the last 5-1/2 months.  Believes that she drinks enough water, may be dehydrated due to diarrhea.    Pain of left heel  New problem to examiner.  Patient was seen by a nurse practitioner at her podiatrist office who believes that her left heel pain was related to plantar fasciitis.  Patient states that she does have plantar fasciitis, has done exercises for plantar fasciitis, and has an orthotic.  States that she is not having pain first up in the morning and exercises have worsened her pain not improve them.  She followed up with the podiatrist a week ago who told her that it is not plantar fasciitis but heel pad syndrome.  Patient states that the pain is located on the bottom of her left heel and worsens with walking or weightbearing.  She was on her feet 2-1/2 hours a day and her heel is \"killing her\".  Directions from podiatrist were to stop wearing orthotics, continue doing plantar fasciitis exercises but more gently, if symptoms do not improve after 2 weeks the plan will be to complete an MRI.  The patient believes that her symptoms are consistent with bursitis and believes that a steroid injection would likely improve her pain.  Due to follow-up with podiatrist in 1 week.    Allergies: Meloxicam and Silicone  Current Outpatient Medications Ordered in Epic   Medication Sig Dispense Refill   • triamcinolone (KENALOG) 0.1 % lotion Apply to scalp after shampooing with Selsun Blue shampoo 60 mL 5   • nystatin (MYCOSTATIN) powder Apply to affected area TID for 7 days 60 g 0   • HYDROcodone-acetaminophen (NORCO) 5-325 MG Tab per tablet      • piroxicam (FELDENE) 10 MG Cap Take 1 Cap by mouth 2 times a day, with meals. 180 Cap 3   • aspirin 81 MG EC tablet Take 1 Tab by mouth 2 times a day, with meals. 90 Tab 0   • pravastatin (PRAVACHOL) 40 MG tablet TAKE ONE TABLET BY MOUTH ONCE DAILY AT BEDTIME 100 Tab 2   • HYDROcodone/acetaminophen (NORCO) "  MG Tab Take 1-2 Tabs by mouth every 6 hours as needed.     • econazole nitrate 1 % cream Apply  to affected area(s) 2 times a day.     • alendronate (FOSAMAX) 70 MG Tab Take 1 Tab by mouth every 7 days. On an empty stomach with a full glass of water, remain upright for 30-60 mins 12 Tab 3   • tizanidine (ZANAFLEX) 4 MG Tab Take 1 Tab by mouth every 6 hours as needed. For muscle spasm 30 Tab 3   • levothyroxine (SYNTHROID) 50 MCG Tab Take 1 Tab by mouth Every morning on an empty stomach. 90 Tab 3   • vitamin D3, cholecalciferol, 1000 UNIT Tab Take 1 Tab by mouth every day. 100 Tab 3   • ascorbic acid (ASCORBIC ACID) 500 MG Tab Take 1,000 mg by mouth every day.     • BETA CAROTENE PO Take 10,000 Int'l Units by mouth every day.     • B Complex-C-Folic Acid (STRESS B COMPLEX PO) Take  by mouth 2 Times a Day.     • Calcium 1500 MG TABS Take  by mouth every day.     • Magnesium 500 MG CAPS Take 875 mg by mouth every evening.     • Potassium (POTASSIMIN PO) Take 45 mg by mouth every day. (Patient not taking: Reported on 5/27/2021)       No current [facility]-ordered facility-administered medications on file.     Past Medical History:   Diagnosis Date   • Arthritis     osteo - fingers, knees, back   • Disorder of thyroid     hypoactive   • Family history of diabetes mellitus 2/17/2015   • High cholesterol    • Hyperlipidemia    • Pain 4/22/16    low back-chronic   • Pain 1/27/17    right knee   • Personal history of colonic polyps 2/12/2020    Goes to [facility].   • Screening mammogram for high-risk patient 10/7/2019     Past Surgical History:   Procedure Laterality Date   • PB TOTAL KNEE ARTHROPLASTY Right 12/16/2020    Procedure: ARTHROPLASTY, KNEE, TOTAL;  Surgeon: Hugh Grover M.D.;  Location: SURGERY [facility];  Service: Orthopedics   • KNEE ARTHROSCOPY Right 1/30/2017    Procedure: KNEE ARTHROSCOPY;  Surgeon: Will Mattson M.D.;  Location: SURGERY [facility];  Service:    • MEDIAL  "MENISCECTOMY  1/30/2017    Procedure: MEDIAL MENISCECTOMY - PARTIAL;  Surgeon: Will Mattson M.D.;  Location: SURGERY [facility];  Service:    • TRIGGER FINGER RELEASE Left 4/27/2016    Procedure: TRIGGER FINGER RELEASE - THUMB;  Surgeon: Bhavin Barker M.D.;  Location: SURGERY [facility];  Service:    • DEQUERVAINS RELEASE Right 3/2015    wrist   • COLONOSCOPY  2009   • SHOULDER SURGERY Left 2008    removal of bone spur left shoulder     Social History     Tobacco Use   • Smoking status: Never Smoker   • Smokeless tobacco: Never Used   Vaping Use   • Vaping Use: Never used   Substance Use Topics   • Alcohol use: No   • Drug use: No     Social History     Social History Narrative   • Not on file     Family History   Problem Relation Age of Onset   • Stroke Father    • Diabetes Father    • Asthma Mother    • Heart Attack Mother 55   • Obesity Mother    • No Known Problems Brother    • No Known Problems Maternal Grandmother    • Cancer Maternal Grandfather    • No Known Problems Paternal Grandmother    • No Known Problems Paternal Grandfather    • Cancer Brother         throat cancer   • Diabetes Son         type 1     ROS:   Constitutional: No fevers, chills, malaise/fatigue.  Eyes: No eye pain.  ENT: No sore throat, congestion.   Resp: No cough, shortness of breath.  CV: No chest pain, leg swelling, palpitations.  GI: + Chronic diarrhea.  No nausea/vomiting, abdominal pain, constipation, diarrhea.  : No dysuria, hematuria.  MSK: + Left heel pain.  No weakness.  Skin: + Scalp pruritus.  No rashes.  Neuro: No dizziness, weakness, headaches.  Psych: No suicidal ideations.    All remaining systems reviewed and found to be negative, except as stated above.        Exam: /64 (BP Location: Left arm, Patient Position: Sitting, BP Cuff Size: Adult)   Pulse 73   Temp 35.9 °C (96.7 °F) (Temporal)   Ht 1.626 m (5' 4\")   Wt 87.1 kg (192 lb)   SpO2 96%  Body mass index is 32.96 " kg/m².    General: Well nourished, well developed female in NAD, awake and conversant.  Eyes: Normal conjunctiva, anicteric.  Round symmetrical pupils.  ENT: Hearing grossly intact.  No nasal discharge.  Neck: Neck is supple.  No masses or thyromegaly.  CV: No lower extremity edema.  Respiratory: Respirations are nonlabored.  No wheezing.  Abdomen: Non-Distended.  Skin: Warm.  No rashes or ulcers.  MSK: Normal ambulation.  No clubbing or cyanosis.  Neuro: Sensation and CN II-XII grossly normal.  Psych: Alert and oriented.  Cooperative, appropriate mood and affect, normal judgment.     Assessment/Plan:  1. Diarrhea, unspecified type  New problem to examiner, chronic problem for the patient that has been ongoing for approximately 5 and half months.  Encourage patient to increase fiber in diet either through food or over-the-counter supplements, recommend fiber Gummies.  Also recommend over-the-counter probiotic daily.    Consider holding over-the-counter magnesium supplement while experiencing loose stools/diarrhea.  Return to be seen if diarrhea does not improve with interventions or if new symptoms develop including abdominal pain, fevers, chills, or other concerning symptoms.    2. Seborrheic dermatitis of scalp  New problem to examiner, chronic problem for the patient.  Continue triamcinolone lotion to scalp after shampooing with Selsun Blue shampoo as needed for scalp itching.  Refill sent to pharmacy.  - triamcinolone (KENALOG) 0.1 % lotion; Apply to scalp after shampooing with Selsun Blue shampoo  Dispense: 60 mL; Refill: 5    3. Pain of left heel  New problem to examiner, chronic problem for the patient.  Continue to follow with podiatry, scheduled for follow-up next week.  Patient reporting left heel pain worsening with light exercise, recommend contacting podiatrist sooner than follow-up to request order for MRI in order to get scheduled.    4. Stage 3a chronic kidney disease (HCC)  New to examiner, chronic  problem for the patient.  Continue to limit NSAIDs.  Encourage patient to stay hydrated, especially considering chronic diarrhea.  Due for updated annual labs in May 2021.    Educated in proper administration of medication(s) ordered today including safety, possible SE, risks, benefits, rationale and alternatives to therapy.   Supportive care, differential diagnoses, and indications for immediate follow-up discussed with patient.    Pathogenesis of diagnosis discussed including typical length and natural progression.    Instructed to return to clinic or nearest emergency department for any change in condition, further concerns, or worsening of symptoms.  Patient states understanding of the plan of care and discharge instructions.    Return if symptoms worsen or fail to improve.    Please note that this dictation was created using voice recognition software. I have made every reasonable attempt to correct obvious errors, but I expect that there are errors of grammar and possibly content that I did not discover before finalizing the note.

## 2021-06-02 NOTE — ASSESSMENT & PLAN NOTE
New problem to examiner, chronic problem for the patient.  Patient continues to use triamcinolone 0.1% lotion applied to her scalp after shampooing with Selsun Blue shampoo every other day to every 3 to 4 days as needed for scalp itching.  Requesting medication refill.

## 2021-06-02 NOTE — ASSESSMENT & PLAN NOTE
"New problem to examiner.  Patient reports that she had her knee surgery 5-1/2 months ago.  After her surgery, she was taking hydrocodone as needed for pain, states that she took the medication without food for about 1 week which caused nausea and a poor appetite.  Patient states that she never had constipation when taking the pain medications.  Since this, she has been experiencing watery to loose stools 2-3 times per day.  She has not tried increasing fiber in her diet or using over-the-counter fiber supplements or probiotics.  She has not tried any over-the-counter antidiarrheals.  She did try discontinuing piroxicam temporarily, which she usually does monthly due to kidney function, and symptoms seemed to improve without the medication but not resolved.  Patient reports that she has lower abdominal/pelvic symptoms that \"feel funny\" which are a signal to her that she needs to go to the bathroom.  She denies any abdominal pain, gas, excessive bloating, blood in her stools, nausea or vomiting.  "

## 2021-06-02 NOTE — ASSESSMENT & PLAN NOTE
New problem to examiner, chronic problem for the patient. Most recent GFR decreased at 50, greater than 60 in December 2020 and 53 in June 2020.  Patient does continue piroxicam 10 mg twice daily, continues to withhold the medication a few days per month due to kidney function.  She has been experiencing loose and watery stools for the last 5-1/2 months.  Believes that she drinks enough water, may be dehydrated due to diarrhea.

## 2021-06-16 ENCOUNTER — HOSPITAL ENCOUNTER (OUTPATIENT)
Dept: RADIOLOGY | Facility: MEDICAL CENTER | Age: 71
End: 2021-06-16
Attending: NURSE PRACTITIONER
Payer: MEDICARE

## 2021-06-16 DIAGNOSIS — Z78.0 POSTMENOPAUSAL: ICD-10-CM

## 2021-06-16 DIAGNOSIS — Z12.31 ENCOUNTER FOR SCREENING MAMMOGRAM FOR BREAST CANCER: ICD-10-CM

## 2021-06-16 DIAGNOSIS — Z00.00 ROUTINE HEALTH MAINTENANCE: ICD-10-CM

## 2021-06-16 PROCEDURE — 77063 BREAST TOMOSYNTHESIS BI: CPT

## 2021-06-16 PROCEDURE — 77080 DXA BONE DENSITY AXIAL: CPT

## 2021-06-17 ENCOUNTER — TELEPHONE (OUTPATIENT)
Dept: MEDICAL GROUP | Facility: PHYSICIAN GROUP | Age: 71
End: 2021-06-17

## 2021-06-17 NOTE — TELEPHONE ENCOUNTER
Phone Number Called:[phone] (home)        Call outcome: Did not leave a detailed message. Requested patient to call back.    Message: Mammogram result

## 2021-06-17 NOTE — TELEPHONE ENCOUNTER
----- Message from ADENIKE Ramos sent at 6/17/2021 11:40 AM PDT -----  Please notify patient that her mammogram results have been read.  There was no evidence of malignancy or suspicious areas of concern.   Recommend yearly follow up screening.

## 2021-07-01 ENCOUNTER — HOSPITAL ENCOUNTER (OUTPATIENT)
Dept: RADIOLOGY | Facility: MEDICAL CENTER | Age: 71
End: 2021-07-01
Attending: ORTHOPAEDIC SURGERY
Payer: MEDICARE

## 2021-07-01 DIAGNOSIS — M25.572 LEFT ANKLE PAIN, UNSPECIFIED CHRONICITY: ICD-10-CM

## 2021-07-01 PROCEDURE — 73721 MRI JNT OF LWR EXTRE W/O DYE: CPT | Mod: LT,MH

## 2021-07-07 DIAGNOSIS — M54.42 CHRONIC LEFT-SIDED LOW BACK PAIN WITH LEFT-SIDED SCIATICA: ICD-10-CM

## 2021-07-07 DIAGNOSIS — G89.29 CHRONIC LEFT-SIDED LOW BACK PAIN WITH LEFT-SIDED SCIATICA: ICD-10-CM

## 2021-07-07 RX ORDER — PREDNISONE 10 MG/1
TABLET ORAL
Qty: 35 TABLET | Refills: 1 | Status: SHIPPED | OUTPATIENT
Start: 2021-07-07 | End: 2021-12-07

## 2021-07-07 NOTE — PROGRESS NOTES
Requested Prescriptions     Signed Prescriptions Disp Refills   • predniSONE (DELTASONE) 10 MG Tab 35 tablet 1     Sig: Take with food. Take 6 pills on day 1, then 5 pills on days 2 and 3, then 4 pills on days 4 and 5, then 3 pills on days 6 and 7, then 2 pills on days 8 and 9, then 1 pill on day 10, then stop.       SHYANN Hdez.

## 2021-07-10 DIAGNOSIS — E03.9 ACQUIRED HYPOTHYROIDISM: ICD-10-CM

## 2021-07-11 RX ORDER — LEVOTHYROXINE SODIUM 0.05 MG/1
50 TABLET ORAL
Qty: 90 TABLET | Refills: 2 | Status: SHIPPED | OUTPATIENT
Start: 2021-07-11 | End: 2022-04-22 | Stop reason: SDUPTHER

## 2021-07-11 NOTE — TELEPHONE ENCOUNTER
Requested Prescriptions     Pending Prescriptions Disp Refills   • levothyroxine (SYNTHROID) 50 MCG Tab [Pharmacy Med Name: LEVOTHYROXINE 50 MCG TABLET] 90 tablet 2     Sig: Take 1 tablet by mouth every morning on an empty stomach.       SHYANN Hdez.

## 2021-07-13 ENCOUNTER — TELEMEDICINE (OUTPATIENT)
Dept: MEDICAL GROUP | Facility: PHYSICIAN GROUP | Age: 71
End: 2021-07-13
Payer: MEDICARE

## 2021-07-13 VITALS — WEIGHT: 192 LBS | HEIGHT: 64 IN | BODY MASS INDEX: 32.78 KG/M2

## 2021-07-13 DIAGNOSIS — E03.9 ACQUIRED HYPOTHYROIDISM: ICD-10-CM

## 2021-07-13 DIAGNOSIS — J02.9 SORE THROAT: ICD-10-CM

## 2021-07-13 DIAGNOSIS — J01.40 ACUTE NON-RECURRENT PANSINUSITIS: ICD-10-CM

## 2021-07-13 DIAGNOSIS — R19.7 DIARRHEA, UNSPECIFIED TYPE: ICD-10-CM

## 2021-07-13 PROBLEM — J34.89 SINUS PAIN: Status: ACTIVE | Noted: 2021-07-13

## 2021-07-13 PROCEDURE — 99214 OFFICE O/P EST MOD 30 MIN: CPT | Mod: 95,CS | Performed by: NURSE PRACTITIONER

## 2021-07-13 RX ORDER — AMOXICILLIN AND CLAVULANATE POTASSIUM 875; 125 MG/1; MG/1
1 TABLET, FILM COATED ORAL 2 TIMES DAILY
Qty: 14 TABLET | Refills: 0 | Status: SHIPPED | OUTPATIENT
Start: 2021-07-13 | End: 2021-07-20

## 2021-07-13 ASSESSMENT — FIBROSIS 4 INDEX: FIB4 SCORE: 1.572124952426025792

## 2021-07-13 NOTE — PROGRESS NOTES
"Virtual Visit: Established Patient   This visit was conducted via Zoom using secure and encrypted videoconferencing technology. The patient was in a private location in the state of [place].    The patient's identity was confirmed and verbal consent was obtained for this virtual visit.    Subjective:   CC:   Chief Complaint   Patient presents with   • Sinus Problem     Persistant for 1 week. Causing ear issues     Deneen Temple is a 71 y.o. female presenting for evaluation and management of:    Sinus pain  New problem to examiner. Patient reports that symptoms began last Wednesday. Symptoms started with a cough and some throat/upper chest tightness and progressed to a croupy cough, sore throat, right ear pain, poor appetite, headaches, back of her throat is red and splotchy, and a few white spots on bilateral tonsils. Patient states that she had chronic ear infections as a child. Reports that she generally does not have sinus tenderness with a sinus infection. States that her temperature is baseline around 96 °F, and she has been running a low-grade fever with her temperatures being in the 98 °F range. The patient's  has had a cold that started with a sore throat on Monday, 2 days prior to her symptoms beginning. She has been using over-the-counter Robitussin-DM during the day and at nighttime cold in the evening.    Diarrhea  Chronic, improving. Patient states that she stopped magnesium supplements and has been regularly taking over-the-counter probiotics and fiber Gummies. Reports that with these changes her bowel movements are \"getting back to normal\".    Acquired hypothyroidism  Chronic, ongoing and stable. Continues levothyroxine 50 mcg every morning on an empty stomach. Recently read directions that said to not take within 4 hours of calcium or iron supplements. Due for annual labs in May 2022.     ROS   Constitutional: + Low-grade fever. No chills, malaise/fatigue.  Eyes: No eye pain.  ENT: + " Right ear pain, sinus congestion, sore throat.  Resp: + Cough and chest tightness. No shortness of breath.  CV: No chest pain, leg swelling, palpitations.  GI: + Poor appetite, improving diarrhea. No nausea/vomiting, abdominal pain, constipation.  : No dysuria, hematuria.  MSK: No weakness.  Skin: No rashes.  Neuro: No dizziness, weakness, headaches.  Psych: No suicidal ideations.    All remaining systems reviewed and found to be negative, except as stated above.      Allergies   Allergen Reactions   • Meloxicam Nausea     nausea   • Silicone Itching     Current medicines (including changes today)  Current Outpatient Medications   Medication Sig Dispense Refill   • amoxicillin-clavulanate (AUGMENTIN) 875-125 MG Tab Take 1 tablet by mouth 2 times a day for 7 days. 14 tablet 0   • levothyroxine (SYNTHROID) 50 MCG Tab Take 1 tablet by mouth every morning on an empty stomach. 90 tablet 2   • predniSONE (DELTASONE) 10 MG Tab Take with food. Take 6 pills on day 1, then 5 pills on days 2 and 3, then 4 pills on days 4 and 5, then 3 pills on days 6 and 7, then 2 pills on days 8 and 9, then 1 pill on day 10, then stop. 35 tablet 1   • triamcinolone (KENALOG) 0.1 % lotion Apply to scalp after shampooing with Selsun Blue shampoo 60 mL 5   • nystatin (MYCOSTATIN) powder Apply to affected area TID for 7 days 60 g 0   • piroxicam (FELDENE) 10 MG Cap Take 1 Cap by mouth 2 times a day, with meals. 180 Cap 3   • aspirin 81 MG EC tablet Take 1 Tab by mouth 2 times a day, with meals. 90 Tab 0   • pravastatin (PRAVACHOL) 40 MG tablet TAKE ONE TABLET BY MOUTH ONCE DAILY AT BEDTIME 100 Tab 2   • HYDROcodone/acetaminophen (NORCO)  MG Tab Take 1-2 Tabs by mouth every 6 hours as needed.     • alendronate (FOSAMAX) 70 MG Tab Take 1 Tab by mouth every 7 days. On an empty stomach with a full glass of water, remain upright for 30-60 mins 12 Tab 3   • tizanidine (ZANAFLEX) 4 MG Tab Take 1 Tab by mouth every 6 hours as needed. For muscle  spasm 30 Tab 3   • vitamin D3, cholecalciferol, 1000 UNIT Tab Take 1 Tab by mouth every day. 100 Tab 3   • ascorbic acid (ASCORBIC ACID) 500 MG Tab Take 1,000 mg by mouth every day.     • BETA CAROTENE PO Take 10,000 Int'l Units by mouth every day.     • B Complex-C-Folic Acid (STRESS B COMPLEX PO) Take  by mouth 2 Times a Day.     • Calcium 1500 MG TABS Take  by mouth every day.       No current facility-administered medications for this visit.     Patient Active Problem List    Diagnosis Date Noted   • Sinus pain 07/13/2021   • Stage 3a chronic kidney disease (HCC) 06/02/2021   • Diarrhea 06/02/2021   • Pain of left heel 06/02/2021   • Disorder of thyroid    • Arthritis    • Acquired hypothyroidism 06/23/2020   • Seasonal allergies 06/23/2020   • Rash 06/23/2020   • Skin lesion of right leg 06/23/2020   • Chronic left-sided low back pain with left-sided sciatica 07/08/2019   • Seborrheic dermatitis of scalp 04/04/2018   • Vitamin D deficiency 03/31/2017   • Obesity (BMI 30-39.9) 03/28/2017   • Status post right knee replacement 01/30/2017   • Degenerative arthritis of lumbar spine 02/17/2015   • Osteopenia 02/17/2015   • Mixed hyperlipidemia 02/17/2015     Family History   Problem Relation Age of Onset   • Stroke Father    • Diabetes Father    • Asthma Mother    • Heart Attack Mother 55   • Obesity Mother    • No Known Problems Brother    • No Known Problems Maternal Grandmother    • Cancer Maternal Grandfather    • No Known Problems Paternal Grandmother    • No Known Problems Paternal Grandfather    • Cancer Brother         throat cancer   • Diabetes Son         type 1     She  has a past medical history of Arthritis, Disorder of thyroid, Family history of diabetes mellitus (2/17/2015), High cholesterol, Hyperlipidemia, Pain (4/22/16), Pain (1/27/17), Personal history of colonic polyps (2/12/2020), and Screening mammogram for high-risk patient (10/7/2019).  She  has a past surgical history that includes shoulder  "surgery (Left, 2008); trigger finger release (Left, 4/27/2016); dequervains release (Right, 3/2015); colonoscopy (2009); knee arthroscopy (Right, 1/30/2017); medial meniscectomy (1/30/2017); and pr total knee arthroplasty (Right, 12/16/2020).     Objective:   Ht 1.626 m (5' 4\") Comment: Per Patient  Wt 87.1 kg (192 lb) Comment: Per Patient  BMI 32.96 kg/m²     Physical Exam:  Constitutional: Alert, no distress, well-groomed.  Skin: No rashes in visible areas.  Eye: Round. Conjunctiva clear, lids normal. No icterus.   ENMT: Lips pink without lesions, good dentition, moist mucous membranes. Phonation normal.  Neck: No masses, no thyromegaly. Moves freely without pain.  Respiratory: Unlabored respiratory effort, no cough or audible wheeze  Psych: Alert and oriented x3, normal affect and mood.     Assessment and Plan:   The following treatment plan was discussed:   1. Acute non-recurrent pansinusitis  2. Sore throat  New problem to examiner, symptoms began last Wednesday, 7/7/2021.  Continue over-the-counter Robitussin-DM during the day and nighttime cold at night. Stay hydrated, eat small frequent snacks.  Start Augmentin 1 tablet twice daily for 7 days. Take antibiotic with food to prevent stomach upset. Continue probiotics daily.  If symptoms are not improving by Friday, 7/16/2021, plan to get Covid testing. Please look up Covid testing times on the [employer] website.  - amoxicillin-clavulanate (AUGMENTIN) 875-125 MG Tab; Take 1 tablet by mouth 2 times a day for 7 days.  Dispense: 14 tablet; Refill: 0  - COVID/SARS CoV-2 PCR; Future    3. Diarrhea, unspecified type  Chronic, improving. Continue probiotics and fiber supplement daily.    4. Acquired hypothyroidism  Chronic, ongoing and stable. Continue levothyroxine 50 mcg daily, does not need refill at this time. Due for annual labs in May 2021.  Take this medicine by mouth with plenty of water. It is best to take on an empty stomach, at least 30 minutes before or 2 " hours after food. Follow the directions on the prescription label. Take at the same time each day. Do not take your medicine more often than directed.  Here is a list of some medications that can interact with levothyroxine.  · antacids  · calcium supplements  · iron supplements  · liquid nutrition products like Ensure  · medicines for colds and breathing difficulties  · simethicone  · soy isoflavones  · steroid medicines like prednisone or cortisone  · sucralfate    Follow-up: Return if symptoms worsen or fail to improve.       Please note that this dictation was created using voice recognition software. I have worked with consultants from the vendor as well as technical experts from [facility] to optimize the interface. I have made every reasonable attempt to correct obvious errors, but I expect that there are errors of grammar and possibly content that I did not discover before finalizing the note.

## 2021-07-13 NOTE — ASSESSMENT & PLAN NOTE
Chronic, ongoing and stable. Continues levothyroxine 50 mcg every morning on an empty stomach. Recently read directions that said to not take within 4 hours of calcium or iron supplements. Due for annual labs in May 2022.

## 2021-07-13 NOTE — ASSESSMENT & PLAN NOTE
"Chronic, improving. Patient states that she stopped magnesium supplements and has been regularly taking over-the-counter probiotics and fiber Gummies. Reports that with these changes her bowel movements are \"getting back to normal\".  "

## 2021-07-13 NOTE — ASSESSMENT & PLAN NOTE
New problem to examiner. Patient reports that symptoms began last Wednesday. Symptoms started with a cough and some throat/upper chest tightness and progressed to a croupy cough, sore throat, right ear pain, poor appetite, headaches, back of her throat is red and splotchy, and a few white spots on bilateral tonsils. Patient states that she had chronic ear infections as a child. Reports that she generally does not have sinus tenderness with a sinus infection. States that her temperature is baseline around 96 °F, and she has been running a low-grade fever with her temperatures being in the 98 °F range. The patient's  has had a cold that started with a sore throat on Monday, 2 days prior to her symptoms beginning. She has been using over-the-counter Robitussin-DM during the day and at nighttime cold in the evening.

## 2021-07-13 NOTE — PATIENT INSTRUCTIONS
Levothyroxine tablets  What is this medicine?  LEVOTHYROXINE (dayton voe thye ESTEFANIA een) is a thyroid hormone. This medicine can improve symptoms of thyroid deficiency such as slow speech, lack of energy, weight gain, hair loss, dry skin, and feeling cold. It also helps to treat goiter (an enlarged thyroid gland). It is also used to treat some kinds of thyroid cancer along with surgery and other medicines.  This medicine may be used for other purposes; ask your health care provider or pharmacist if you have questions.  COMMON BRAND NAME(S): Estre, Euthyrox, Levo-T, Levothroid, Levoxyl, Synthroid, Thyro-Tabs, Unithroid  What should I tell my health care provider before I take this medicine?  They need to know if you have any of these conditions:  · Clermont's disease or other adrenal gland problem  · angina  · bone problems  · diabetes  · dieting or on a weight loss program  · fertility problems  · heart disease  · pituitary gland problem  · take medicines that treat or prevent blood clots  · an unusual or allergic reaction to levothyroxine, thyroid hormones, other medicines, foods, dyes, or preservatives  · pregnant or trying to get pregnant  · breast-feeding  How should I use this medicine?  Take this medicine by mouth with plenty of water. It is best to take on an empty stomach, at least 30 minutes before or 2 hours after food. Follow the directions on the prescription label. Take at the same time each day. Do not take your medicine more often than directed.  Contact your pediatrician regarding the use of this medicine in children. While this drug may be prescribed for children and infants as young as a few days of age for selected conditions, precautions do apply. For infants, you may crush the tablet and place in a small amount of (5-10 ml or 1 to 2 teaspoonfuls) of water, breast milk, or non-soy based infant formula. Do not mix with soy-based infant formula. Give as directed.  Overdosage: If you think you have taken  too much of this medicine contact a poison control center or emergency room at once.  NOTE: This medicine is only for you. Do not share this medicine with others.  What if I miss a dose?  If you miss a dose, take it as soon as you can. If it is almost time for your next dose, take only that dose. Do not take double or extra doses.  What may interact with this medicine?  · amiodarone  · antacids  · anti-thyroid medicines  · calcium supplements  · carbamazepine  · certain medicines for depression  · certain medicines to treat cancer  · cholestyramine  · clofibrate  · colesevelam  · colestipol  · digoxin  · female hormones, like estrogens or progestins and birth control pills, patches, rings, or injections  · iron supplements  · kayexylate  · ketamine  · liquid nutrition products like Ensure  · lithium  · medicines for colds and breathing difficulties  · medicines for diabetes  · medicines or dietary supplements for weight loss  · methadone  · niacin  · orlistat  · oxandrolone  · phenobarbital or other barbiturates  · phenytoin  · rifampin  · sevelamer  · simethicone  · soy isoflavones  · steroid medicines like prednisone or cortisone  · sucralfate  · testosterone  · theophylline  · warfarin  This list may not describe all possible interactions. Give your health care provider a list of all the medicines, herbs, non-prescription drugs, or dietary supplements you use. Also tell them if you smoke, drink alcohol, or use illegal drugs. Some items may interact with your medicine.  What should I watch for while using this medicine?  Be sure to take this medicine with plenty of fluids. Some tablets may cause choking, gagging, or difficulty swallowing from the tablet getting stuck in your throat. Most of these problems disappear if the medicine is taken with the right amount of water or other fluids.  Do not switch brands of this medicine unless your health care professional agrees with the change. Ask questions if you are  uncertain.  You will need regular exams and occasional blood tests to check the response to treatment. If you are receiving this medicine for an underactive thyroid, it may be several weeks before you notice an improvement. Check with your doctor or health care professional if your symptoms do not improve.  It may be necessary for you to take this medicine for the rest of your life. Do not stop using this medicine unless your doctor or health care professional advises you to.  This medicine can affect blood sugar levels. If you have diabetes, check your blood sugar as directed.  You may lose some of your hair when you first start treatment. With time, this usually corrects itself.  If you are going to have surgery, tell your doctor or health care professional that you are taking this medicine.  What side effects may I notice from receiving this medicine?  Side effects that you should report to your doctor or health care professional as soon as possible:  · allergic reactions like skin rash, itching or hives, swelling of the face, lips, or tongue  · anxious  · breathing problems  · changes in menstrual periods  · chest pain  · diarrhea  · excessive sweating or intolerance to heat  · fast or irregular heartbeat  · leg cramps  · nervousness  · swelling of ankles, feet, or legs  · tremors  · trouble sleeping  · vomiting  Side effects that usually do not require medical attention (report to your doctor or health care professional if they continue or are bothersome):  · changes in appetite  · headache  · irritable  · nausea  · weight loss  This list may not describe all possible side effects. Call your doctor for medical advice about side effects. You may report side effects to FDA at 0-799-FDA-0902.  Where should I keep my medicine?  Keep out of the reach of children.  Store at room temperature between 15 and 30 degrees C (59 and 86 degrees F). Protect from light and moisture. Keep container tightly closed. Throw away  any unused medicine after the expiration date.  NOTE: This sheet is a summary. It may not cover all possible information. If you have questions about this medicine, talk to your doctor, pharmacist, or health care provider.  © 2020 [employer]/Gold Standard (2018-01-29 15:39:30)

## 2021-07-19 ENCOUNTER — PATIENT MESSAGE (OUTPATIENT)
Dept: HEALTH INFORMATION MANAGEMENT | Facility: OTHER | Age: 71
End: 2021-07-19

## 2021-07-30 ENCOUNTER — PATIENT OUTREACH (OUTPATIENT)
Dept: HEALTH INFORMATION MANAGEMENT | Facility: OTHER | Age: 71
End: 2021-07-30

## 2021-07-30 NOTE — NON-PROVIDER
Outcome:    Introduction completed, provided member direct line and services available. No further questions. Member declined MIKAELA.       [facility] Verified: yes    Attempt # 1

## 2021-08-06 ENCOUNTER — PATIENT OUTREACH (OUTPATIENT)
Dept: HEALTH INFORMATION MANAGEMENT | Facility: OTHER | Age: 71
End: 2021-08-06

## 2021-08-10 NOTE — NON-PROVIDER
Reminder-Comprehensive Health Assessment.Member confirmed appointment, verified HIPAA, no questions or concerns.

## 2021-08-22 DIAGNOSIS — M85.80 OSTEOPENIA, UNSPECIFIED LOCATION: ICD-10-CM

## 2021-08-23 RX ORDER — ALENDRONATE SODIUM 70 MG/1
TABLET ORAL
Qty: 12 TABLET | Refills: 3 | Status: SHIPPED | OUTPATIENT
Start: 2021-08-23 | End: 2022-07-19

## 2021-08-24 NOTE — TELEPHONE ENCOUNTER
Requested Prescriptions     Pending Prescriptions Disp Refills   • alendronate (FOSAMAX) 70 MG Tab [Pharmacy Med Name: ALENDRONATE SODIUM 70 MG TAB] 12 Tablet 3     Sig: TAKE 1 TABLET BY MOUTH ONE TIME WEEKLY IN THE MORNING WITH FULL GLASS WATER 30 MINUTES BEFORE FIRST MEAL, BEVERAGE, OR MEDICATION OF THE DAY REMAIN UPRIGHT       SHYANN Hdez.

## 2021-09-28 ENCOUNTER — NON-PROVIDER VISIT (OUTPATIENT)
Dept: URGENT CARE | Facility: PHYSICIAN GROUP | Age: 71
End: 2021-09-28
Payer: MEDICARE

## 2021-09-28 DIAGNOSIS — Z23 NEED FOR VACCINATION: ICD-10-CM

## 2021-09-28 PROCEDURE — G0008 ADMIN INFLUENZA VIRUS VAC: HCPCS | Performed by: INTERNAL MEDICINE

## 2021-09-28 PROCEDURE — 90662 IIV NO PRSV INCREASED AG IM: CPT | Performed by: INTERNAL MEDICINE

## 2021-09-28 NOTE — PROGRESS NOTES
"Deneen Temple is a 71 y.o. female here for a non-provider visit for:   FLU    Reason for immunization: Annual Flu Vaccine  Immunization records indicate need for vaccine: Yes, confirmed with Epic  Minimum interval has been met for this vaccine: Yes  ABN completed: Yes    VIS Dated  08/06/21 was given to patient: Yes  All IAC Questionnaire questions were answered \"No.\"    Patient tolerated injection and no adverse effects were observed or reported: Yes    Pt scheduled for next dose in series: No  "

## 2021-10-12 ENCOUNTER — APPOINTMENT (RX ONLY)
Dept: URBAN - METROPOLITAN AREA CLINIC 22 | Facility: CLINIC | Age: 71
Setting detail: DERMATOLOGY
End: 2021-10-12

## 2021-10-12 DIAGNOSIS — L82.0 INFLAMED SEBORRHEIC KERATOSIS: ICD-10-CM

## 2021-10-12 PROCEDURE — ? COUNSELING

## 2021-10-12 PROCEDURE — 99212 OFFICE O/P EST SF 10 MIN: CPT

## 2021-10-12 ASSESSMENT — LOCATION DETAILED DESCRIPTION DERM: LOCATION DETAILED: LOWER STERNUM

## 2021-10-12 ASSESSMENT — LOCATION ZONE DERM: LOCATION ZONE: TRUNK

## 2021-10-12 ASSESSMENT — LOCATION SIMPLE DESCRIPTION DERM: LOCATION SIMPLE: CHEST

## 2021-10-12 NOTE — HPI: SKIN LESION
Is This A New Presentation, Or A Follow-Up?: Skin Lesion
What Type Of Note Output Would You Prefer (Optional)?: Bullet Format
How Severe Is Your Skin Lesion?: moderate
Has Your Skin Lesion Been Treated?: been treated
When Was It Treated?: 10/2020

## 2021-10-12 NOTE — PROCEDURE: COUNSELING
Patient Specific Counseling (Will Not Stick From Patient To Patient): OTC hydrocortisone can be used to calm the irritation.
Detail Level: Detailed

## 2021-10-13 DIAGNOSIS — E78.2 MIXED HYPERLIPIDEMIA: ICD-10-CM

## 2021-10-13 RX ORDER — PRAVASTATIN SODIUM 40 MG
40 TABLET ORAL DAILY
Qty: 100 TABLET | Refills: 1 | Status: SHIPPED | OUTPATIENT
Start: 2021-10-13 | End: 2022-06-06 | Stop reason: SDUPTHER

## 2021-10-13 NOTE — TELEPHONE ENCOUNTER
Requested Prescriptions     Pending Prescriptions Disp Refills   • pravastatin (PRAVACHOL) 40 MG tablet [Pharmacy Med Name: PRAVASTATIN SODIUM 40 MG TAB] 100 Tablet 1     Sig: Take 1 Tablet by mouth every day.       SHYANN Hdez.

## 2021-11-04 PROBLEM — M25.372 ANKLE INSTABILITY, LEFT: Status: ACTIVE | Noted: 2021-11-04

## 2021-11-24 ENCOUNTER — PATIENT MESSAGE (OUTPATIENT)
Dept: MEDICAL GROUP | Facility: PHYSICIAN GROUP | Age: 71
End: 2021-11-24

## 2021-11-24 DIAGNOSIS — E78.2 MIXED HYPERLIPIDEMIA: ICD-10-CM

## 2021-12-02 ENCOUNTER — HOSPITAL ENCOUNTER (OUTPATIENT)
Dept: LAB | Facility: MEDICAL CENTER | Age: 71
End: 2021-12-02
Attending: NURSE PRACTITIONER
Payer: MEDICARE

## 2021-12-02 DIAGNOSIS — E78.2 MIXED HYPERLIPIDEMIA: ICD-10-CM

## 2021-12-02 LAB
ALBUMIN SERPL BCP-MCNC: 4.3 G/DL (ref 3.2–4.9)
ALBUMIN/GLOB SERPL: 2.2 G/DL
ALP SERPL-CCNC: 59 U/L (ref 30–99)
ALT SERPL-CCNC: 19 U/L (ref 2–50)
ANION GAP SERPL CALC-SCNC: 9 MMOL/L (ref 7–16)
AST SERPL-CCNC: 21 U/L (ref 12–45)
BILIRUB SERPL-MCNC: 0.6 MG/DL (ref 0.1–1.5)
BUN SERPL-MCNC: 16 MG/DL (ref 8–22)
CALCIUM SERPL-MCNC: 9.5 MG/DL (ref 8.5–10.5)
CHLORIDE SERPL-SCNC: 105 MMOL/L (ref 96–112)
CHOLEST SERPL-MCNC: 179 MG/DL (ref 100–199)
CO2 SERPL-SCNC: 27 MMOL/L (ref 20–33)
CREAT SERPL-MCNC: 0.95 MG/DL (ref 0.5–1.4)
FASTING STATUS PATIENT QL REPORTED: NORMAL
GLOBULIN SER CALC-MCNC: 2 G/DL (ref 1.9–3.5)
GLUCOSE SERPL-MCNC: 93 MG/DL (ref 65–99)
HDLC SERPL-MCNC: 57 MG/DL
LDLC SERPL CALC-MCNC: 99 MG/DL
POTASSIUM SERPL-SCNC: 4.5 MMOL/L (ref 3.6–5.5)
PROT SERPL-MCNC: 6.3 G/DL (ref 6–8.2)
SODIUM SERPL-SCNC: 141 MMOL/L (ref 135–145)
TRIGL SERPL-MCNC: 114 MG/DL (ref 0–149)

## 2021-12-02 PROCEDURE — 80061 LIPID PANEL: CPT

## 2021-12-02 PROCEDURE — 80053 COMPREHEN METABOLIC PANEL: CPT

## 2021-12-02 PROCEDURE — 36415 COLL VENOUS BLD VENIPUNCTURE: CPT

## 2021-12-07 ENCOUNTER — OFFICE VISIT (OUTPATIENT)
Dept: MEDICAL GROUP | Facility: PHYSICIAN GROUP | Age: 71
End: 2021-12-07
Payer: MEDICARE

## 2021-12-07 VITALS
OXYGEN SATURATION: 97 % | BODY MASS INDEX: 34.49 KG/M2 | SYSTOLIC BLOOD PRESSURE: 116 MMHG | DIASTOLIC BLOOD PRESSURE: 76 MMHG | HEART RATE: 86 BPM | WEIGHT: 202 LBS | TEMPERATURE: 96.9 F | HEIGHT: 64 IN

## 2021-12-07 DIAGNOSIS — G89.29 CHRONIC LEFT-SIDED LOW BACK PAIN WITH LEFT-SIDED SCIATICA: ICD-10-CM

## 2021-12-07 DIAGNOSIS — L29.9 ITCHING OF EAR: ICD-10-CM

## 2021-12-07 DIAGNOSIS — N18.31 STAGE 3A CHRONIC KIDNEY DISEASE: ICD-10-CM

## 2021-12-07 DIAGNOSIS — M79.672 PAIN OF LEFT HEEL: ICD-10-CM

## 2021-12-07 DIAGNOSIS — Q68.8 OS TRIGONUM: ICD-10-CM

## 2021-12-07 DIAGNOSIS — M19.90 ARTHRITIS: ICD-10-CM

## 2021-12-07 DIAGNOSIS — M54.42 CHRONIC LEFT-SIDED LOW BACK PAIN WITH LEFT-SIDED SCIATICA: ICD-10-CM

## 2021-12-07 PROBLEM — J34.89 SINUS PAIN: Status: RESOLVED | Noted: 2021-07-13 | Resolved: 2021-12-07

## 2021-12-07 PROBLEM — Z96.651 STATUS POST RIGHT KNEE REPLACEMENT: Status: RESOLVED | Noted: 2017-01-30 | Resolved: 2021-12-07

## 2021-12-07 PROCEDURE — 99214 OFFICE O/P EST MOD 30 MIN: CPT | Performed by: NURSE PRACTITIONER

## 2021-12-07 RX ORDER — KETOCONAZOLE 20 MG/G
1 CREAM TOPICAL 2 TIMES DAILY
Qty: 15 G | Refills: 1 | Status: SHIPPED | OUTPATIENT
Start: 2021-12-07 | End: 2023-05-09 | Stop reason: SDUPTHER

## 2021-12-07 RX ORDER — PREDNISONE 10 MG/1
TABLET ORAL
Qty: 35 TABLET | Refills: 1 | Status: SHIPPED | OUTPATIENT
Start: 2021-12-07 | End: 2023-02-16

## 2021-12-07 RX ORDER — HYDROCODONE BITARTRATE AND ACETAMINOPHEN 10; 325 MG/1; MG/1
.5-1 TABLET ORAL EVERY 8 HOURS PRN
Qty: 21 TABLET | Refills: 0 | Status: SHIPPED | OUTPATIENT
Start: 2021-12-07 | End: 2022-12-10 | Stop reason: SDUPTHER

## 2021-12-07 ASSESSMENT — FIBROSIS 4 INDEX: FIB4 SCORE: 1.42

## 2021-12-07 NOTE — ASSESSMENT & PLAN NOTE
Chronic, ongoing problem for the patient.  Most recent GFR 58 on 12/2/2021.  Patient's GFR has ranged from 46 to greater than 60 since 2016.  She had been taking piroxicam 10 mg twice daily with meals for arthritis pain.  Since becoming aware of the chronic kidney disease, she has cut the piroxicam dosing in half, but does note that her arthritis pain is worse.  She has not tried acetaminophen to supplement.  She is taking a natural supplement that includes regular on turmeric, but it is not managing the pain completely.  She has tried glucosamine in the past and it did not really help.

## 2021-12-07 NOTE — ASSESSMENT & PLAN NOTE
New to examiner.  Patient has been following with [facility] for this issue.  She was diagnosed with os trigonum of the left ankle and was scheduled for surgery, but has decided to cancel this.  She has had improved pain relief with steroid injections in the past and would like to continue with this currently.

## 2021-12-07 NOTE — ASSESSMENT & PLAN NOTE
Intermittent problem for the patient.  States that she has been affected by intermittent left-sided low back pain with left-sided sciatica for at least 25 years.  States that when she has a flare, she will have immediate pain in her back in any upright position, sitting and standing that improves with lying supine.  When she has a flare, she has muscle spasms and back pain which she treats with tizanidine, heat, rest, and hydrocodone for severe symptoms.  States that she has generally had a flare every July since 2018, but did have a second flare this year in October.  Separately, she does have a left outer hip area pain that is not sciatica pain that is also constant if she is upright.  Previously, she has been prescribed a prednisone taper that she has only needed once per year in the summer, but she did needed a second time this October, requesting refill to have on hand.  Medrol Dosepak in the past was not effective.  States that she will use hydrocodone usually every other month for about 3 days, requesting refill today.

## 2021-12-07 NOTE — ASSESSMENT & PLAN NOTE
New to examiner.  Patient was seen by previous PCP in 2017 with reports of right-sided inner ear itching.  She was given a prescription for ketoconazole cream at that time, states that her prescription had  and she discarded the medication.  Requesting refill for as needed use.

## 2021-12-07 NOTE — ASSESSMENT & PLAN NOTE
New to examiner.  Patient has been following with [facility] for this issue.  She was diagnosed with os trigonum of the left ankle and was scheduled for surgery, but has decided to cancel this.  She has had improved pain relief with steroid injections in the past and would like to continue with this currently.

## 2021-12-07 NOTE — ASSESSMENT & PLAN NOTE
Chronic, ongoing problem for the patient. She had been taking piroxicam 10 mg twice daily with meals for arthritis pain. Since becoming aware of the chronic kidney disease, she has cut the piroxicam dosing in half, but does note that her arthritis pain is worse.  She has not tried acetaminophen to supplement.  She is taking a natural supplement that includes regular on turmeric, but it is not managing the pain completely.  She has tried glucosamine in the past and it did not really help.

## 2021-12-08 NOTE — PROGRESS NOTES
CC:   Chief Complaint   Patient presents with   • Results     Labs     HISTORY OF THE PRESENT ILLNESS: Patient is a 71 y.o. female. This pleasant patient is here today to discuss issues listed below.    Health Maintenance: Reviewed    Stage 3a chronic kidney disease (HCC)  Chronic, ongoing problem for the patient.  Most recent GFR 58 on 12/2/2021.  Patient's GFR has ranged from 46 to greater than 60 since 2016.  She had been taking piroxicam 10 mg twice daily with meals for arthritis pain.  Since becoming aware of the chronic kidney disease, she has cut the piroxicam dosing in half, but does note that her arthritis pain is worse.  She has not tried acetaminophen to supplement.  She is taking a natural supplement that includes regular on turmeric, but it is not managing the pain completely.  She has tried glucosamine in the past and it did not really help.    Chronic left-sided low back pain with left-sided sciatica  Intermittent problem for the patient.  States that she has been affected by intermittent left-sided low back pain with left-sided sciatica for at least 25 years.  States that when she has a flare, she will have immediate pain in her back in any upright position, sitting and standing that improves with lying supine.  When she has a flare, she has muscle spasms and back pain which she treats with tizanidine, heat, rest, and hydrocodone for severe symptoms.  States that she has generally had a flare every July since 2018, but did have a second flare this year in October.  Separately, she does have a left outer hip area pain that is not sciatica pain that is also constant if she is upright.  Previously, she has been prescribed a prednisone taper that she has only needed once per year in the summer, but she did needed a second time this October, requesting refill to have on hand.  Medrol Dosepak in the past was not effective.  States that she will use hydrocodone usually every other month for about 3 days,  requesting refill today.    Itching of ear  New to examiner.  Patient was seen by previous PCP in 2017 with reports of right-sided inner ear itching.  She was given a prescription for ketoconazole cream at that time, states that her prescription had  and she discarded the medication.  Requesting refill for as needed use.    Arthritis  Chronic, ongoing problem for the patient. She had been taking piroxicam 10 mg twice daily with meals for arthritis pain. Since becoming aware of the chronic kidney disease, she has cut the piroxicam dosing in half, but does note that her arthritis pain is worse.  She has not tried acetaminophen to supplement.  She is taking a natural supplement that includes regular on turmeric, but it is not managing the pain completely.  She has tried glucosamine in the past and it did not really help.    Os trigonum  Pain of left heel  New to examiner.  Patient has been following with [facility] for this issue.  She was diagnosed with os trigonum of the left ankle and was scheduled for surgery, but has decided to cancel this.  She has had improved pain relief with steroid injections in the past and would like to continue with this currently.    Allergies: Meloxicam and Silicone  Current Outpatient Medications Ordered in Epic   Medication Sig Dispense Refill   • predniSONE (DELTASONE) 10 MG Tab Take with food. Take 6 pills on day 1, then 5 pills on days 2 and 3, then 4 pills on days 4 and 5, then 3 pills on days 6 and 7, then 2 pills on days 8 and 9, then 1 pill on day 10, then stop. 35 Tablet 1   • ketoconazole (NIZORAL) 2 % Cream Apply 1 Squirt topically 2 times a day. Use on right external ear canal 15 g 1   • HYDROcodone/acetaminophen (NORCO)  MG Tab Take 0.5-1 Tablets by mouth every 8 hours as needed for Severe Pain for up to 7 days. 21 Tablet 0   • pravastatin (PRAVACHOL) 40 MG tablet Take 1 Tablet by mouth every day. 100 Tablet 1   • alendronate (FOSAMAX) 70 MG Tab  TAKE 1 TABLET BY MOUTH ONE TIME WEEKLY IN THE MORNING WITH FULL GLASS WATER 30 MINUTES BEFORE FIRST MEAL, BEVERAGE, OR MEDICATION OF THE DAY REMAIN UPRIGHT 12 Tablet 3   • levothyroxine (SYNTHROID) 50 MCG Tab Take 1 tablet by mouth every morning on an empty stomach. 90 tablet 2   • triamcinolone (KENALOG) 0.1 % lotion Apply to scalp after shampooing with Selsun Blue shampoo 60 mL 5   • nystatin (MYCOSTATIN) powder Apply to affected area TID for 7 days 60 g 0   • piroxicam (FELDENE) 10 MG Cap Take 1 Cap by mouth 2 times a day, with meals. 180 Cap 3   • aspirin 81 MG EC tablet Take 1 Tab by mouth 2 times a day, with meals. 90 Tab 0   • tizanidine (ZANAFLEX) 4 MG Tab Take 1 Tab by mouth every 6 hours as needed. For muscle spasm 30 Tab 3   • vitamin D3, cholecalciferol, 1000 UNIT Tab Take 1 Tab by mouth every day. 100 Tab 3   • ascorbic acid (ASCORBIC ACID) 500 MG Tab Take 1,000 mg by mouth every day.     • BETA CAROTENE PO Take 10,000 Int'l Units by mouth every day.     • B Complex-C-Folic Acid (STRESS B COMPLEX PO) Take  by mouth 2 Times a Day.     • Calcium 1500 MG TABS Take  by mouth every day.       No current [facility]-ordered facility-administered medications on file.     Past Medical History:   Diagnosis Date   • Arthritis     osteo - fingers, knees, back   • Disorder of thyroid     hypoactive   • Family history of diabetes mellitus 2/17/2015   • High cholesterol    • Hyperlipidemia    • Pain 4/22/16    low back-chronic   • Pain 1/27/17    right knee   • Personal history of colonic polyps 2/12/2020    Goes to [facility].   • Screening mammogram for high-risk patient 10/7/2019   • Status post right knee replacement 1/30/2017     Past Surgical History:   Procedure Laterality Date   • PB TOTAL KNEE ARTHROPLASTY Right 12/16/2020    Procedure: ARTHROPLASTY, KNEE, TOTAL;  Surgeon: Hugh Grover M.D.;  Location: SURGERY [facility];  Service: Orthopedics   • KNEE ARTHROSCOPY Right 1/30/2017    Procedure: KNEE  "ARTHROSCOPY;  Surgeon: Will Mattson M.D.;  Location: SURGERY [facility];  Service:    • MEDIAL MENISCECTOMY  1/30/2017    Procedure: MEDIAL MENISCECTOMY - PARTIAL;  Surgeon: Will Mattson M.D.;  Location: SURGERY [facility];  Service:    • TRIGGER FINGER RELEASE Left 4/27/2016    Procedure: TRIGGER FINGER RELEASE - THUMB;  Surgeon: Bhavin Barker M.D.;  Location: SURGERY [facility];  Service:    • DEQUERVAINS RELEASE Right 3/2015    wrist   • COLONOSCOPY  2009   • SHOULDER SURGERY Left 2008    removal of bone spur left shoulder     Social History     Tobacco Use   • Smoking status: Never Smoker   • Smokeless tobacco: Never Used   Vaping Use   • Vaping Use: Never used   Substance Use Topics   • Alcohol use: No   • Drug use: No     Social History     Social History Narrative   • Not on file     Family History   Problem Relation Age of Onset   • Stroke Father    • Diabetes Father    • Asthma Mother    • Heart Attack Mother 55   • Obesity Mother    • No Known Problems Brother    • No Known Problems Maternal Grandmother    • Cancer Maternal Grandfather    • No Known Problems Paternal Grandmother    • No Known Problems Paternal Grandfather    • Cancer Brother         throat cancer   • Diabetes Son         type 1     ROS:   See HPI      Exam: /76 (BP Location: Left arm, Patient Position: Sitting, BP Cuff Size: Large adult)   Pulse 86   Temp 36.1 °C (96.9 °F) (Temporal)   Ht 1.626 m (5' 4\")   Wt 91.6 kg (202 lb)   SpO2 97%  Body mass index is 34.67 kg/m².    General: Well nourished, well developed female in NAD, awake and conversant.  Eyes: Normal conjunctiva, anicteric.  Round symmetrical pupils.  ENT: Hearing grossly intact.  No nasal discharge.  Neck: Neck is supple.  No masses or thyromegaly.  CV: No lower extremity edema.  Respiratory: Respirations are nonlabored.  No wheezing.  Abdomen: Non-Distended.  Skin: Warm.  No rashes or ulcers.  MSK: Normal ambulation.  No " clubbing or cyanosis.  Neuro: Sensation and CN II-XII grossly normal.  Psych: Alert and oriented.  Cooperative, appropriate mood and affect, normal judgment.     Assessment/Plan:  1. Stage 3a chronic kidney disease (HCC)  Chronic, ongoing. Encourage patient to regularly increase water intake and limit NSAIDs. Due for annual labs in May 2022.    2. Chronic left-sided low back pain with left-sided sciatica  3. Arthritis  Chronic, intermittent problem for the patient. Refill of as needed prescription of prednisone sent to pharmacy. Refill for hydrocodone sent to pharmacy, medication to be taken only as needed. Urine drug screen obtained today, controlled substance treatment agreement signed today.  Patient understands this prescription is a controlled substance which is potentially habit-forming and its use is regulated by the [place]. Refills are subject to terms of a controlled substance treatment agreement. Any refill requires a new prescription that must be obtained from this office during regular office hours. Patient advised that they must be seen in clinic every 90 days for refills. This medicine can cause nausea, significant constipation, sedation, confusion.   - predniSONE (DELTASONE) 10 MG Tab; Take with food. Take 6 pills on day 1, then 5 pills on days 2 and 3, then 4 pills on days 4 and 5, then 3 pills on days 6 and 7, then 2 pills on days 8 and 9, then 1 pill on day 10, then stop.  Dispense: 35 Tablet; Refill: 1  - Controlled Substance Treatment Agreement  - [facility] PAIN MANAGEMENT SCREEN; Future  - HYDROcodone/acetaminophen (NORCO)  MG Tab; Take 0.5-1 Tablets by mouth every 8 hours as needed for Severe Pain for up to 7 days.  Dispense: 21 Tablet; Refill: 0    4. Itching of ear  New to examiner, intermittent problem for the patient. Refill of ketoconazole cream sent to pharmacy, use topically to right ear as needed for itching.  - ketoconazole (NIZORAL) 2 % Cream; Apply 1 Squirt topically 2 times a  day. Use on right external ear canal  Dispense: 15 g; Refill: 1    5. Os trigonum  6. Pain of left heel  New to examiner, ongoing problem for the patient. Continue to follow with orthopedics at [facility].    Educated in proper administration of medication(s) ordered today including safety, possible SE, risks, benefits, rationale and alternatives to therapy.   Supportive care, differential diagnoses, and indications for immediate follow-up discussed with patient.    Pathogenesis of diagnosis discussed including typical length and natural progression.    Instructed to return to clinic or nearest emergency department for any change in condition, further concerns, or worsening of symptoms.  Patient states understanding of the plan of care and discharge instructions.    Return for As needed.    I have placed the below orders and discussed them with an approved delegating provider.  The MA is performing the below orders under the direction of Dr. Menon.    Please note that this dictation was created using voice recognition software. I have made every reasonable attempt to correct obvious errors, but I expect that there are errors of grammar and possibly content that I did not discover before finalizing the note.

## 2022-01-25 ENCOUNTER — OFFICE VISIT (OUTPATIENT)
Dept: MEDICAL GROUP | Facility: PHYSICIAN GROUP | Age: 72
End: 2022-01-25
Payer: MEDICARE

## 2022-01-25 VITALS
DIASTOLIC BLOOD PRESSURE: 70 MMHG | SYSTOLIC BLOOD PRESSURE: 118 MMHG | WEIGHT: 202 LBS | BODY MASS INDEX: 34.49 KG/M2 | OXYGEN SATURATION: 97 % | HEIGHT: 64 IN | TEMPERATURE: 97.6 F | HEART RATE: 88 BPM

## 2022-01-25 DIAGNOSIS — Z02.89 ENCOUNTER FOR COMPLETION OF FORM WITH PATIENT: ICD-10-CM

## 2022-01-25 DIAGNOSIS — Z02.4 ENCOUNTER FOR DRIVER'S LICENSE HISTORY AND PHYSICAL: ICD-10-CM

## 2022-01-25 DIAGNOSIS — M19.90 ARTHRITIS: ICD-10-CM

## 2022-01-25 PROCEDURE — 99214 OFFICE O/P EST MOD 30 MIN: CPT | Performed by: NURSE PRACTITIONER

## 2022-01-25 RX ORDER — DULOXETIN HYDROCHLORIDE 30 MG/1
30 CAPSULE, DELAYED RELEASE ORAL DAILY
Qty: 90 CAPSULE | Refills: 0 | Status: SHIPPED | OUTPATIENT
Start: 2022-01-25 | End: 2022-03-22

## 2022-01-25 ASSESSMENT — PATIENT HEALTH QUESTIONNAIRE - PHQ9: CLINICAL INTERPRETATION OF PHQ2 SCORE: 0

## 2022-01-25 ASSESSMENT — FIBROSIS 4 INDEX: FIB4 SCORE: 1.42

## 2022-01-25 NOTE — ASSESSMENT & PLAN NOTE
Chronic, ongoing.  The patient has been taking piroxicam 10 mg twice daily with meals for arthritis pain.  She has decided to discontinue the medication due to chronic kidney disease stage IIIa, initially she cut it to 10 mg once a day and had immediate return of polyarthralgia.  She has not been taking any piroxicam.  She has been trying to use over-the-counter acetaminophen, started glucosamine supplement, and has tried turmeric as well.  She purchased over-the-counter Voltaren gel and use the medication as prescribed and is not getting the pain relief she previously had with piroxicam.  She has tried meloxicam in the past but this medication upset her stomach, Celebrex did not do anything for her pain.

## 2022-01-26 NOTE — PROGRESS NOTES
CC:   Chief Complaint   Patient presents with   • Paperwork     DMV     HISTORY OF THE PRESENT ILLNESS: Patient is a 71 y.o. female. This pleasant patient is here today for DMV paperwork completed for drivers license renewal. She would also like to discuss arthritis.    Health Maintenance: Reviewed.    Arthritis  Chronic, ongoing.  The patient has been taking piroxicam 10 mg twice daily with meals for arthritis pain.  She has decided to discontinue the medication due to chronic kidney disease stage IIIa, initially she cut it to 10 mg once a day and had immediate return of polyarthralgia.  She has not been taking any piroxicam.  She has been trying to use over-the-counter acetaminophen, started glucosamine supplement, and has tried turmeric as well.  She purchased over-the-counter Voltaren gel and use the medication as prescribed and is not getting the pain relief she previously had with piroxicam.  She has tried meloxicam in the past but this medication upset her stomach, Celebrex did not do anything for her pain.    Allergies: Meloxicam and Silicone  Current Outpatient Medications Ordered in Epic   Medication Sig Dispense Refill   • DULoxetine (CYMBALTA) 30 MG Cap DR Particles Take 1 Capsule by mouth every day. 90 Capsule 0   • predniSONE (DELTASONE) 10 MG Tab Take with food. Take 6 pills on day 1, then 5 pills on days 2 and 3, then 4 pills on days 4 and 5, then 3 pills on days 6 and 7, then 2 pills on days 8 and 9, then 1 pill on day 10, then stop. 35 Tablet 1   • ketoconazole (NIZORAL) 2 % Cream Apply 1 Squirt topically 2 times a day. Use on right external ear canal 15 g 1   • pravastatin (PRAVACHOL) 40 MG tablet Take 1 Tablet by mouth every day. 100 Tablet 1   • alendronate (FOSAMAX) 70 MG Tab TAKE 1 TABLET BY MOUTH ONE TIME WEEKLY IN THE MORNING WITH FULL GLASS WATER 30 MINUTES BEFORE FIRST MEAL, BEVERAGE, OR MEDICATION OF THE DAY REMAIN UPRIGHT 12 Tablet 3   • levothyroxine (SYNTHROID) 50 MCG Tab Take 1 tablet  by mouth every morning on an empty stomach. 90 tablet 2   • triamcinolone (KENALOG) 0.1 % lotion Apply to scalp after shampooing with Selsun Blue shampoo 60 mL 5   • nystatin (MYCOSTATIN) powder Apply to affected area TID for 7 days 60 g 0   • piroxicam (FELDENE) 10 MG Cap Take 1 Cap by mouth 2 times a day, with meals. 180 Cap 3   • aspirin 81 MG EC tablet Take 1 Tab by mouth 2 times a day, with meals. 90 Tab 0   • tizanidine (ZANAFLEX) 4 MG Tab Take 1 Tab by mouth every 6 hours as needed. For muscle spasm 30 Tab 3   • vitamin D3, cholecalciferol, 1000 UNIT Tab Take 1 Tab by mouth every day. 100 Tab 3   • ascorbic acid (ASCORBIC ACID) 500 MG Tab Take 1,000 mg by mouth every day.     • BETA CAROTENE PO Take 10,000 Int'l Units by mouth every day.     • B Complex-C-Folic Acid (STRESS B COMPLEX PO) Take  by mouth 2 Times a Day.     • Calcium 1500 MG TABS Take  by mouth every day.       No current [facility]-ordered facility-administered medications on file.     Past Medical History:   Diagnosis Date   • Arthritis     osteo - fingers, knees, back   • Disorder of thyroid     hypoactive   • Family history of diabetes mellitus 2/17/2015   • High cholesterol    • Hyperlipidemia    • Pain 4/22/16    low back-chronic   • Pain 1/27/17    right knee   • Personal history of colonic polyps 2/12/2020    Goes to [facility].   • Screening mammogram for high-risk patient 10/7/2019   • Status post right knee replacement 1/30/2017     Past Surgical History:   Procedure Laterality Date   • PB TOTAL KNEE ARTHROPLASTY Right 12/16/2020    Procedure: ARTHROPLASTY, KNEE, TOTAL;  Surgeon: Hugh Grover M.D.;  Location: [facility];  Service: Orthopedics   • KNEE ARTHROSCOPY Right 1/30/2017    Procedure: KNEE ARTHROSCOPY;  Surgeon: Will Mattson M.D.;  Location: [facility];  Service:    • MENISCECTOMY, KNEE, MEDIAL  1/30/2017    Procedure: MEDIAL MENISCECTOMY - PARTIAL;  Surgeon: Will Mattson M.D.;   "Location: SURGERY [facility];  Service:    • TRIGGER FINGER RELEASE Left 4/27/2016    Procedure: TRIGGER FINGER RELEASE - THUMB;  Surgeon: Bhavin Barker M.D.;  Location: SURGERY [facility];  Service:    • DEQUERVAINS RELEASE Right 3/2015    wrist   • COLONOSCOPY  2009   • SHOULDER SURGERY Left 2008    removal of bone spur left shoulder     Social History     Tobacco Use   • Smoking status: Never Smoker   • Smokeless tobacco: Never Used   Vaping Use   • Vaping Use: Never used   Substance Use Topics   • Alcohol use: No   • Drug use: No     Social History     Social History Narrative   • Not on file     Family History   Problem Relation Age of Onset   • Stroke Father    • Diabetes Father    • Asthma Mother    • Heart Attack Mother 55   • Obesity Mother    • No Known Problems Brother    • No Known Problems Maternal Grandmother    • Cancer Maternal Grandfather    • No Known Problems Paternal Grandmother    • No Known Problems Paternal Grandfather    • Cancer Brother         throat cancer   • Diabetes Son         type 1     ROS:   See HPI      Exam: /70 (BP Location: Left arm, Patient Position: Sitting, BP Cuff Size: Adult)   Pulse 88   Temp 36.4 °C (97.6 °F) (Temporal)   Ht 1.626 m (5' 4\")   Wt 91.6 kg (202 lb)   SpO2 97%  Body mass index is 34.67 kg/m².    General: Normal appearing. No distress.  HEENT: Normocephalic. Eyes conjunctiva clear lids without ptosis, pupils equal and reactive to light accommodation, ears normal shape and contour, canals are clear bilaterally, tympanic membranes are benign, nasal mucosa benign, oropharynx is without erythema, edema or exudates. Sinuses (frontal and maxillary) nontender to palpation.  Neck: Supple without JVD or bruit. Thyroid is not enlarged.  Pulmonary: Clear to ausculation.  Normal effort. No rales, rhonchi, or wheezing.  Cardiovascular: Regular rate and rhythm without murmur. Carotid and radial pulses are intact and equal bilaterally.  Abdomen: " Soft, nontender, nondistended. Normal bowel sounds.  Neurologic: Grossly nonfocal.  Lymph: No cervical or supraclavicular lymph nodes are palpable.  Skin: Warm and dry.  No obvious lesions.  Musculoskeletal: Normal gait. No extremity cyanosis, clubbing, or edema.  Psych: Normal mood and affect. Alert and oriented x3. Judgment and insight is normal.     Assessment/Plan:  1. Encounter for 's license history and physical  2. Encounter for completion of form with patient  Patient is oriented x4, able to move all extremities including neck, has good strength of her extremities, good memory recall, is on no medications that could cause harm with driving, and has no medical diagnoses that would make her unsafe to drive.  Vision exam completed in clinic.  I have filled out the form for her to have her license renewed and I do not feel that she needs any restrictions.  Form scanned into chart and original return to patient.    3. Arthritis  Chronic, ongoing problem for the patient that has worsened since discontinuing piroxicam due to kidney function.  Encourage nonpharmacologic therapy for osteoarthritis pain management including:  Regular exercise. Patient reports that she has completed physical therapy in the past for her knees.  Recommend physical therapy, declines at this time.  Loss of at least 10% of body weight through diet and exercise.  Recommend over-the-counter topical capsaicin cream.  Patient is interested in trialing duloxetine for polyarthralgia related to osteoarthritis.  Patient to start duloxetine 30 mg once daily, reviewed possible side effects of dizziness, nausea, upset stomach, diarrhea, the symptoms usually improve within 2 weeks of taking the medication.  Take the medication before bed if it causes drowsiness, take with food if it causes nausea.  Advised patient that it can take 6 to 12 weeks for medication to take full effect.  Educated patient that if she decides to discontinue the medication  after being on it for a few months, she should not abruptly stop the medication and we should wean off.  - DULoxetine (CYMBALTA) 30 MG Cap DR Particles; Take 1 Capsule by mouth every day.  Dispense: 90 Capsule; Refill: 0     Educated in proper administration of medication(s) ordered today including safety, possible SE, risks, benefits, rationale and alternatives to therapy.   Supportive care, differential diagnoses, and indications for immediate follow-up discussed with patient.    Pathogenesis of diagnosis discussed including typical length and natural progression.    Instructed to return to clinic or nearest emergency department for any change in condition, further concerns, or worsening of symptoms.  Patient states understanding of the plan of care and discharge instructions.    Return in about 3 months (around 4/25/2022) for Arthritis, Follow up Medications.    Please note that this dictation was created using voice recognition software. I have made every reasonable attempt to correct obvious errors, but I expect that there are errors of grammar and possibly content that I did not discover before finalizing the note.

## 2022-02-02 PROBLEM — M25.372 ANKLE INSTABILITY, LEFT: Status: ACTIVE | Noted: 2021-11-04

## 2022-03-22 DIAGNOSIS — M47.816 OSTEOARTHRITIS OF LUMBAR SPINE, UNSPECIFIED SPINAL OSTEOARTHRITIS COMPLICATION STATUS: ICD-10-CM

## 2022-03-22 RX ORDER — PIROXICAM 10 MG/1
10 CAPSULE ORAL
Qty: 180 CAPSULE | Refills: 3 | Status: SHIPPED | OUTPATIENT
Start: 2022-03-22 | End: 2023-04-27

## 2022-03-23 NOTE — PROGRESS NOTES
Requested Prescriptions     Signed Prescriptions Disp Refills   • piroxicam (FELDENE) 10 MG Cap 180 Capsule 3     Sig: Take 1 Capsule by mouth 2 times daily with meals as needed (back pain).       SHYANN Hdez.

## 2022-04-11 DIAGNOSIS — R93.7 ABNORMAL MRI, LUMBAR SPINE: ICD-10-CM

## 2022-04-12 RX ORDER — TIZANIDINE 4 MG/1
4 TABLET ORAL EVERY 6 HOURS PRN
Qty: 30 TABLET | Refills: 3 | Status: SHIPPED | OUTPATIENT
Start: 2022-04-12 | End: 2022-12-07

## 2022-04-12 NOTE — TELEPHONE ENCOUNTER
Requested Prescriptions     Pending Prescriptions Disp Refills   • tizanidine (ZANAFLEX) 4 MG Tab 30 Tablet 3     Sig: Take 1 Tablet by mouth every 6 hours as needed. For muscle spasm       SHYANN Hdez.

## 2022-04-22 DIAGNOSIS — N89.8 VAGINAL ITCHING: ICD-10-CM

## 2022-04-22 DIAGNOSIS — E03.9 ACQUIRED HYPOTHYROIDISM: ICD-10-CM

## 2022-04-25 DIAGNOSIS — N18.31 STAGE 3A CHRONIC KIDNEY DISEASE: ICD-10-CM

## 2022-04-25 DIAGNOSIS — E78.2 MIXED HYPERLIPIDEMIA: ICD-10-CM

## 2022-04-25 DIAGNOSIS — Z00.00 ROUTINE HEALTH MAINTENANCE: ICD-10-CM

## 2022-04-25 DIAGNOSIS — M85.80 OSTEOPENIA, UNSPECIFIED LOCATION: ICD-10-CM

## 2022-04-25 DIAGNOSIS — E03.9 ACQUIRED HYPOTHYROIDISM: ICD-10-CM

## 2022-04-25 DIAGNOSIS — E66.9 OBESITY (BMI 30-39.9): ICD-10-CM

## 2022-04-25 DIAGNOSIS — E55.9 VITAMIN D DEFICIENCY: ICD-10-CM

## 2022-04-25 RX ORDER — LEVOTHYROXINE SODIUM 0.05 MG/1
50 TABLET ORAL
Qty: 90 TABLET | Refills: 0 | Status: SHIPPED | OUTPATIENT
Start: 2022-04-25 | End: 2022-06-06 | Stop reason: SDUPTHER

## 2022-04-25 RX ORDER — NYSTATIN 100000 [USP'U]/G
POWDER TOPICAL
Qty: 60 G | Refills: 0 | Status: SHIPPED | OUTPATIENT
Start: 2022-04-25 | End: 2022-06-13

## 2022-04-26 NOTE — TELEPHONE ENCOUNTER
Requested Prescriptions     Pending Prescriptions Disp Refills   • nystatin (MYCOSTATIN) powder 60 g 0     Sig: Apply to affected area TID for 7 days   • levothyroxine (SYNTHROID) 50 MCG Tab 90 Tablet 0     Sig: Take 1 Tablet by mouth every morning on an empty stomach.       SHYANN Hdez.

## 2022-04-26 NOTE — PROGRESS NOTES
1. Acquired hypothyroidism  - TSH WITH REFLEX TO FT4; Future    2. Mixed hyperlipidemia  - Comp Metabolic Panel; Future  - TSH WITH REFLEX TO FT4; Future    3. Obesity (BMI 30-39.9)  - CBC WITH DIFFERENTIAL; Future  - Comp Metabolic Panel; Future  - TSH WITH REFLEX TO FT4; Future    4. Vitamin D deficiency  - VITAMIN D,25 HYDROXY; Future    5. Stage 3a chronic kidney disease (HCC)  - CBC WITH DIFFERENTIAL; Future  - Comp Metabolic Panel; Future  - MICROALBUMIN CREAT RATIO URINE; Future    6. Osteopenia, unspecified location  - TSH WITH REFLEX TO FT4; Future  - VITAMIN D,25 HYDROXY; Future    7. Routine health maintenance  - CBC WITH DIFFERENTIAL; Future  - Comp Metabolic Panel; Future  - MICROALBUMIN CREAT RATIO URINE; Future  - TSH WITH REFLEX TO FT4; Future  - VITAMIN D,25 HYDROXY; Future       Due for annual fasting labs at the end of May 2022.

## 2022-05-31 ENCOUNTER — TELEPHONE (OUTPATIENT)
Dept: MEDICAL GROUP | Facility: PHYSICIAN GROUP | Age: 72
End: 2022-05-31
Payer: MEDICARE

## 2022-05-31 NOTE — TELEPHONE ENCOUNTER
Future Appointments       Provider Department Center    6/6/2022 11:00 AM (Arrive by 10:45 AM) SHYANN Hdez. [facility] [place] [facility]    6/15/2022 11:00 AM (Arrive by 10:45 AM) Elin Kent P.A.-[facility]    6/17/2022 11:15 AM [facility] [place] [facility]        ESTABLISHED PATIENT PRE-VISIT PLANNING     Patient was contacted to complete PVP.     Note: Patient will not be contacted if there is no indication to call.     1.  Reviewed notes from the last few office visits within the medical group: Yes, LOV 01/25/2022    2.  If any orders were placed at last visit or intended to be done for this visit (i.e. 6 mos follow-up), do we have Results/Consult Notes?         •  Labs - Labs ordered, NOT completed. Patient advised to complete prior to next appointment.  Note: If patient appointment is for lab review and patient did not complete labs, check with provider if OK to reschedule patient until labs completed.       •  Imaging - Imaging was not ordered at last office visit.       •  Referrals - No referrals were ordered at last office visit.    3. Is this appointment scheduled as a Hospital Follow-Up? No    4.  Immunizations were updated in Epic using Reconcile Outside Information activity? Yes    5.  Patient is due for the following Health Maintenance Topics:   Health Maintenance Due   Topic Date Due   • URINE DRUG SCREEN  Never done   • COVID-19 Vaccine (1) Never done   • MAMMOGRAM  06/16/2022     6.  AHA (Pulse8) form printed for Provider? N/A

## 2022-06-03 ENCOUNTER — HOSPITAL ENCOUNTER (OUTPATIENT)
Dept: LAB | Facility: MEDICAL CENTER | Age: 72
End: 2022-06-03
Attending: NURSE PRACTITIONER
Payer: MEDICARE

## 2022-06-03 DIAGNOSIS — M85.80 OSTEOPENIA, UNSPECIFIED LOCATION: ICD-10-CM

## 2022-06-03 DIAGNOSIS — N18.31 STAGE 3A CHRONIC KIDNEY DISEASE: ICD-10-CM

## 2022-06-03 DIAGNOSIS — E66.9 OBESITY (BMI 30-39.9): ICD-10-CM

## 2022-06-03 DIAGNOSIS — E55.9 VITAMIN D DEFICIENCY: ICD-10-CM

## 2022-06-03 DIAGNOSIS — Z00.00 ROUTINE HEALTH MAINTENANCE: ICD-10-CM

## 2022-06-03 DIAGNOSIS — E03.9 ACQUIRED HYPOTHYROIDISM: ICD-10-CM

## 2022-06-03 DIAGNOSIS — E78.2 MIXED HYPERLIPIDEMIA: ICD-10-CM

## 2022-06-03 LAB
25(OH)D3 SERPL-MCNC: 74 NG/ML (ref 30–100)
ALBUMIN SERPL BCP-MCNC: 3.8 G/DL (ref 3.2–4.9)
ALBUMIN/GLOB SERPL: 1.5 G/DL
ALP SERPL-CCNC: 70 U/L (ref 30–99)
ALT SERPL-CCNC: 24 U/L (ref 2–50)
ANION GAP SERPL CALC-SCNC: 9 MMOL/L (ref 7–16)
AST SERPL-CCNC: 23 U/L (ref 12–45)
BASOPHILS # BLD AUTO: 0.9 % (ref 0–1.8)
BASOPHILS # BLD: 0.06 K/UL (ref 0–0.12)
BILIRUB SERPL-MCNC: 0.5 MG/DL (ref 0.1–1.5)
BUN SERPL-MCNC: 19 MG/DL (ref 8–22)
CALCIUM SERPL-MCNC: 9.4 MG/DL (ref 8.5–10.5)
CHLORIDE SERPL-SCNC: 106 MMOL/L (ref 96–112)
CO2 SERPL-SCNC: 27 MMOL/L (ref 20–33)
CREAT SERPL-MCNC: 0.99 MG/DL (ref 0.5–1.4)
CREAT UR-MCNC: 139.48 MG/DL
EOSINOPHIL # BLD AUTO: 0.24 K/UL (ref 0–0.51)
EOSINOPHIL NFR BLD: 3.6 % (ref 0–6.9)
ERYTHROCYTE [DISTWIDTH] IN BLOOD BY AUTOMATED COUNT: 45.4 FL (ref 35.9–50)
GFR SERPLBLD CREATININE-BSD FMLA CKD-EPI: 61 ML/MIN/1.73 M 2
GLOBULIN SER CALC-MCNC: 2.6 G/DL (ref 1.9–3.5)
GLUCOSE SERPL-MCNC: 87 MG/DL (ref 65–99)
HCT VFR BLD AUTO: 46.2 % (ref 37–47)
HGB BLD-MCNC: 14.7 G/DL (ref 12–16)
IMM GRANULOCYTES # BLD AUTO: 0.04 K/UL (ref 0–0.11)
IMM GRANULOCYTES NFR BLD AUTO: 0.6 % (ref 0–0.9)
LYMPHOCYTES # BLD AUTO: 1.64 K/UL (ref 1–4.8)
LYMPHOCYTES NFR BLD: 24.8 % (ref 22–41)
MCH RBC QN AUTO: 28.2 PG (ref 27–33)
MCHC RBC AUTO-ENTMCNC: 31.8 G/DL (ref 33.6–35)
MCV RBC AUTO: 88.5 FL (ref 81.4–97.8)
MICROALBUMIN UR-MCNC: <1.2 MG/DL
MICROALBUMIN/CREAT UR: NORMAL MG/G (ref 0–30)
MONOCYTES # BLD AUTO: 0.59 K/UL (ref 0–0.85)
MONOCYTES NFR BLD AUTO: 8.9 % (ref 0–13.4)
NEUTROPHILS # BLD AUTO: 4.03 K/UL (ref 2–7.15)
NEUTROPHILS NFR BLD: 61.2 % (ref 44–72)
NRBC # BLD AUTO: 0 K/UL
NRBC BLD-RTO: 0 /100 WBC
PLATELET # BLD AUTO: 221 K/UL (ref 164–446)
PMV BLD AUTO: 10 FL (ref 9–12.9)
POTASSIUM SERPL-SCNC: 4.9 MMOL/L (ref 3.6–5.5)
PROT SERPL-MCNC: 6.4 G/DL (ref 6–8.2)
RBC # BLD AUTO: 5.22 M/UL (ref 4.2–5.4)
SODIUM SERPL-SCNC: 142 MMOL/L (ref 135–145)
TSH SERPL DL<=0.005 MIU/L-ACNC: 3.33 UIU/ML (ref 0.38–5.33)
WBC # BLD AUTO: 6.6 K/UL (ref 4.8–10.8)

## 2022-06-03 PROCEDURE — 36415 COLL VENOUS BLD VENIPUNCTURE: CPT

## 2022-06-03 PROCEDURE — 80053 COMPREHEN METABOLIC PANEL: CPT

## 2022-06-03 PROCEDURE — 82570 ASSAY OF URINE CREATININE: CPT

## 2022-06-03 PROCEDURE — 84443 ASSAY THYROID STIM HORMONE: CPT

## 2022-06-03 PROCEDURE — 82306 VITAMIN D 25 HYDROXY: CPT

## 2022-06-03 PROCEDURE — 82043 UR ALBUMIN QUANTITATIVE: CPT

## 2022-06-03 PROCEDURE — 85025 COMPLETE CBC W/AUTO DIFF WBC: CPT

## 2022-06-06 ENCOUNTER — OFFICE VISIT (OUTPATIENT)
Dept: MEDICAL GROUP | Facility: PHYSICIAN GROUP | Age: 72
End: 2022-06-06
Payer: MEDICARE

## 2022-06-06 VITALS
TEMPERATURE: 97.6 F | SYSTOLIC BLOOD PRESSURE: 106 MMHG | BODY MASS INDEX: 34.49 KG/M2 | DIASTOLIC BLOOD PRESSURE: 70 MMHG | WEIGHT: 202 LBS | OXYGEN SATURATION: 95 % | HEART RATE: 81 BPM | HEIGHT: 64 IN | RESPIRATION RATE: 19 BRPM

## 2022-06-06 DIAGNOSIS — E55.9 VITAMIN D DEFICIENCY: ICD-10-CM

## 2022-06-06 DIAGNOSIS — G89.29 CHRONIC LEFT-SIDED LOW BACK PAIN WITH LEFT-SIDED SCIATICA: ICD-10-CM

## 2022-06-06 DIAGNOSIS — M54.42 CHRONIC LEFT-SIDED LOW BACK PAIN WITH LEFT-SIDED SCIATICA: ICD-10-CM

## 2022-06-06 DIAGNOSIS — N18.31 STAGE 3A CHRONIC KIDNEY DISEASE: ICD-10-CM

## 2022-06-06 DIAGNOSIS — E03.9 ACQUIRED HYPOTHYROIDISM: ICD-10-CM

## 2022-06-06 DIAGNOSIS — E78.2 MIXED HYPERLIPIDEMIA: ICD-10-CM

## 2022-06-06 DIAGNOSIS — E66.9 OBESITY (BMI 30-39.9): ICD-10-CM

## 2022-06-06 PROBLEM — R19.7 DIARRHEA: Status: RESOLVED | Noted: 2021-06-02 | Resolved: 2022-06-06

## 2022-06-06 PROCEDURE — 99213 OFFICE O/P EST LOW 20 MIN: CPT | Performed by: NURSE PRACTITIONER

## 2022-06-06 RX ORDER — LEVOTHYROXINE SODIUM 0.05 MG/1
50 TABLET ORAL
Qty: 90 TABLET | Refills: 3 | Status: SHIPPED | OUTPATIENT
Start: 2022-06-06 | End: 2023-08-17 | Stop reason: SDUPTHER

## 2022-06-06 RX ORDER — PRAVASTATIN SODIUM 40 MG
40 TABLET ORAL DAILY
Qty: 100 TABLET | Refills: 3 | Status: SHIPPED | OUTPATIENT
Start: 2022-06-06 | End: 2022-12-07

## 2022-06-06 ASSESSMENT — FIBROSIS 4 INDEX: FIB4 SCORE: 1.51

## 2022-06-06 NOTE — ASSESSMENT & PLAN NOTE
Intermittent problem for the patient.  She continues to be affected by intermittent left-sided low back pain with left-sided sciatica for greater than 25 years.  When she has a flare she will have immediate pain in her back in any upright position, sitting, and standing that improves with lying supine.  When she is experiencing a flare she will have muscle spasms and back pain which she treats with tizanidine, heat, rest, and hydrocodone and prednisone for severe symptoms.  She generally has a flare every July, but did experience 2 flares last year.  Separately, she has left outer hip area pain that is not sciatica pain that is also constant if she is upright.  Reports that she continues to do exercises, also purchased a percussion gun that helps.  She avoids sitting for long periods of time and will lie down if needed.

## 2022-06-06 NOTE — ASSESSMENT & PLAN NOTE
Chronic, ongoing.  Continues exercises for chronic knee and back pain.  Due for annual labs in June 2023.

## 2022-06-06 NOTE — LETTER
[facility]  ADRIANO HdezPYaquelinRYaquelinN.  [address]  [place] [address]  Fax: [phone]   Authorization for Release/Disclosure of   Protected Health Information   Name: CHARLIE GARZADARIANA : 1950 SSN: [id number]   Address: [address] [place] [address]  [place] [address] Phone:    [phone] (home)    I authorize the entity listed below to release/disclose the PHI below to:   [facility]/ADRIANO HdezP.R.VALERIA. and SHYANN Hdez.   Provider or Entity Name:  Dr. Geovani Alvarado   Address   [facility]   Phone:      Fax:     Reason for request: continuity of care   Information to be released:    [  ] LAST COLONOSCOPY,  including any PATH REPORT and follow-up  [  ] LAST FIT/COLOGUARD RESULT [  ] LAST DEXA  [  ] LAST MAMMOGRAM  [  ] LAST PAP  [  ] LAST LABS [  ] RETINA EXAM REPORT  [  ] IMMUNIZATION RECORDS  [X] Release all info      [  ] Check here and initial the line next to each item to release ALL health information INCLUDING  _____ Care and treatment for drug and / or alcohol abuse  _____ HIV testing, infection status, or AIDS  _____ Genetic Testing    DATES OF SERVICE OR TIME PERIOD TO BE DISCLOSED: _____________  I understand and acknowledge that:  * This Authorization may be revoked at any time by you in writing, except if your health information has already been used or disclosed.  * Your health information that will be used or disclosed as a result of you signing this authorization could be re-disclosed by the recipient. If this occurs, your re-disclosed health information may no longer be protected by State or Federal laws.  * You may refuse to sign this Authorization. Your refusal will not affect your ability to obtain treatment.  * This Authorization becomes effective upon signing and will  on (date) __________.      If no date is indicated, this Authorization will  one (1) year from the signature date.    Name: Charlie BrownJennie    Signature:   Date:      6/6/2022       PLEASE FAX REQUESTED RECORDS BACK TO: [phone]

## 2022-06-06 NOTE — ASSESSMENT & PLAN NOTE
Chronic, stable.  Continues levothyroxine 50 mcg every morning on an empty stomach, denies side effects of medication.  Due for annual labs in June 2023.

## 2022-06-06 NOTE — PROGRESS NOTES
CC:   Chief Complaint   Patient presents with   • Lab Follow-up     HISTORY OF THE PRESENT ILLNESS: Patient is a 72 y.o. female. This pleasant patient is here today to review recent lab results.    Health Maintenance: Completed    Annual Health Assessment Questions:    1.  Are you currently engaging in any exercise or physical activity? No    2.  How would you describe your mood or emotional well-being today? good    3.  Have you had any falls in the last year? YES    4.  Have you noticed any problems with your balance or had difficulty walking? No    5.  In the last six months have you experienced any leakage of urine? No    6. DPA/Advanced Directive: Patient has Advanced Directive, but it is not on file. Instructed to bring in a copy to scan into their chart.    Chronic left-sided low back pain with left-sided sciatica  Intermittent problem for the patient.  She continues to be affected by intermittent left-sided low back pain with left-sided sciatica for greater than 25 years.  When she has a flare she will have immediate pain in her back in any upright position, sitting, and standing that improves with lying supine.  When she is experiencing a flare she will have muscle spasms and back pain which she treats with tizanidine, heat, rest, and hydrocodone and prednisone for severe symptoms.  She generally has a flare every July, but did experience 2 flares last year.  Separately, she has left outer hip area pain that is not sciatica pain that is also constant if she is upright.  Reports that she continues to do exercises, also purchased a percussion gun that helps.  She avoids sitting for long periods of time and will lie down if needed.    Acquired hypothyroidism  Chronic, stable.  Continues levothyroxine 50 mcg every morning on an empty stomach, denies side effects of medication.  Due for annual labs in June 2023.    Mixed hyperlipidemia  Continues pravastatin 40 mg daily, denies side effects of the medication.   Due for annual labs in June 2023.    Stage 3a chronic kidney disease (HCC)  Resolved on most recent labs, GFR 61.  Patient's GFR has ranged from 46 to greater than 60 since 2016.  She does continue piroxicam 10 mg twice daily for arthritis pain.  Since becoming aware of chronic kidney disease she has been more aware of water intake and hydration status.  Due for annual labs in June 2023.    Vitamin D deficiency  Continues vitamin D 1000 units daily.  Due for annual labs in June 2023.    Obesity (BMI 30-39.9)  Chronic, ongoing.  Continues exercises for chronic knee and back pain.  Due for annual labs in June 2023.    Allergies: Meloxicam and Silicone  Current Outpatient Medications Ordered in Epic   Medication Sig Dispense Refill   • levothyroxine (SYNTHROID) 50 MCG Tab Take 1 Tablet by mouth every morning on an empty stomach. 90 Tablet 3   • pravastatin (PRAVACHOL) 40 MG tablet Take 1 Tablet by mouth every day. 100 Tablet 3   • nystatin (MYCOSTATIN) powder Apply to affected area TID for 7 days 60 g 0   • tizanidine (ZANAFLEX) 4 MG Tab Take 1 Tablet by mouth every 6 hours as needed. For muscle spasm 30 Tablet 3   • piroxicam (FELDENE) 10 MG Cap Take 1 Capsule by mouth 2 times daily with meals as needed (back pain). 180 Capsule 3   • predniSONE (DELTASONE) 10 MG Tab Take with food. Take 6 pills on day 1, then 5 pills on days 2 and 3, then 4 pills on days 4 and 5, then 3 pills on days 6 and 7, then 2 pills on days 8 and 9, then 1 pill on day 10, then stop. 35 Tablet 1   • ketoconazole (NIZORAL) 2 % Cream Apply 1 Squirt topically 2 times a day. Use on right external ear canal 15 g 1   • alendronate (FOSAMAX) 70 MG Tab TAKE 1 TABLET BY MOUTH ONE TIME WEEKLY IN THE MORNING WITH FULL GLASS WATER 30 MINUTES BEFORE FIRST MEAL, BEVERAGE, OR MEDICATION OF THE DAY REMAIN UPRIGHT 12 Tablet 3   • triamcinolone (KENALOG) 0.1 % lotion Apply to scalp after shampooing with Selsun Blue shampoo 60 mL 5   • aspirin 81 MG EC tablet Take  1 Tab by mouth 2 times a day, with meals. 90 Tab 0   • vitamin D3, cholecalciferol, 1000 UNIT Tab Take 1 Tab by mouth every day. 100 Tab 3   • ascorbic acid (ASCORBIC ACID) 500 MG Tab Take 1,000 mg by mouth every day.     • BETA CAROTENE PO Take 10,000 Int'l Units by mouth every day.     • B Complex-C-Folic Acid (STRESS B COMPLEX PO) Take  by mouth 2 Times a Day.     • Calcium 1500 MG TABS Take  by mouth every day.       No current [facility]-ordered facility-administered medications on file.     Past Medical History:   Diagnosis Date   • Arthritis     osteo - fingers, knees, back   • Disorder of thyroid     hypoactive   • Family history of diabetes mellitus 2/17/2015   • High cholesterol    • Hyperlipidemia    • Pain 4/22/16    low back-chronic   • Pain 1/27/17    right knee   • Personal history of colonic polyps 2/12/2020    Goes to [employer].   • Screening mammogram for high-risk patient 10/7/2019   • Status post right knee replacement 1/30/2017     Past Surgical History:   Procedure Laterality Date   • PB TOTAL KNEE ARTHROPLASTY Right 12/16/2020    Procedure: ARTHROPLASTY, KNEE, TOTAL;  Surgeon: Hugh Grover M.D.;  Location: [facility];  Service: Orthopedics   • KNEE ARTHROSCOPY Right 1/30/2017    Procedure: KNEE ARTHROSCOPY;  Surgeon: Will Mattson M.D.;  Location: [facility];  Service:    • MENISCECTOMY, KNEE, MEDIAL  1/30/2017    Procedure: MEDIAL MENISCECTOMY - PARTIAL;  Surgeon: Will Mattson M.D.;  Location: [facility];  Service:    • TRIGGER FINGER RELEASE Left 4/27/2016    Procedure: TRIGGER FINGER RELEASE - THUMB;  Surgeon: Bhavin Barker M.D.;  Location: [facility];  Service:    • DEQUERVAINS RELEASE Right 3/2015    wrist   • COLONOSCOPY  2009   • SHOULDER SURGERY Left 2008    removal of bone spur left shoulder     Social History     Tobacco Use   • Smoking status: Never Smoker   • Smokeless tobacco: Never Used   Vaping Use   •  "Vaping Use: Never used   Substance Use Topics   • Alcohol use: No   • Drug use: No     Social History     Social History Narrative   • Not on file     Family History   Problem Relation Age of Onset   • Stroke Father    • Diabetes Father    • Asthma Mother    • Heart Attack Mother 55   • Obesity Mother    • No Known Problems Brother    • No Known Problems Maternal Grandmother    • Cancer Maternal Grandfather    • No Known Problems Paternal Grandmother    • No Known Problems Paternal Grandfather    • Cancer Brother         throat cancer   • Diabetes Son         type 1     ROS:   Constitutional: No fevers, chills, malaise/fatigue.  Eyes: No eye pain.  ENT: No sore throat, congestion.   Resp: No cough, shortness of breath.  CV: No chest pain, leg swelling, palpitations.  GI: No nausea/vomiting, abdominal pain, constipation, diarrhea.  : No dysuria, hematuria.  MSK: No weakness.  Skin: No rashes.  Neuro: No dizziness, weakness, headaches.  Psych: No suicidal ideations.    All remaining systems reviewed and found to be negative, except as stated above.        Exam: /70 (BP Location: Left arm, Patient Position: Sitting, BP Cuff Size: Adult)   Pulse 81   Temp 36.4 °C (97.6 °F) (Temporal)   Resp 19   Ht 1.626 m (5' 4\")   Wt 91.6 kg (202 lb)   SpO2 95%  Body mass index is 34.67 kg/m².    General: Well nourished, well developed female in NAD, awake and conversant.  Eyes: Normal conjunctiva, anicteric.  Round symmetrical pupils.  ENT: Hearing grossly intact.  No nasal discharge.  Neck: Neck is supple.  No masses or thyromegaly.  CV: No lower extremity edema.  Respiratory: Respirations are nonlabored.  No wheezing.  Abdomen: Non-Distended.  Skin: Warm.  No rashes or ulcers.  MSK: Normal ambulation.  No clubbing or cyanosis.  Neuro: Sensation and CN II-XII grossly normal.  Psych: Alert and oriented.  Cooperative, appropriate mood and affect, normal judgment.      Assessment/Plan:  1. Stage 3a chronic kidney disease " (HCC)  Currently resolved.  Continue increasing water intake and limiting NSAIDs.  Due for annual labs in June 2023.    2. Acquired hypothyroidism  Continue levothyroxine 50 mcg daily, refill sent to pharmacy.  Due for annual labs in June 2023.  - levothyroxine (SYNTHROID) 50 MCG Tab; Take 1 Tablet by mouth every morning on an empty stomach.  Dispense: 90 Tablet; Refill: 3    3. Mixed hyperlipidemia  Continue pravastatin 40 mg daily, refill sent to pharmacy.  Due for annual labs in June 2023.  - pravastatin (PRAVACHOL) 40 MG tablet; Take 1 Tablet by mouth every day.  Dispense: 100 Tablet; Refill: 3    4. Vitamin D deficiency  Continue over-the-counter vitamin D 1000 units daily.  Due for annual labs in June 2023.    5. Chronic left-sided low back pain with left-sided sciatica  Continue exercises and rest as needed, heat, tizanidine, hydrocodone and prednisone.  Does not need any medication refills at this time.    6. Obesity (BMI 30-39.9)  Encourage diet high in fruits, vegetables, and fiber. And a diet low in salt, refined carbohydrates, cholesterol, saturated fat, and trans fatty acids.    Encourage a minimum of 30 minutes of moderate intensity aerobic exercise (eg, brisk walking) is recommended on five days each week. Or 30 minutes of vigorous-intensity aerobic exercise (eg, jogging) on three days each week.   Patient's body mass index is 34.67 kg/m². Exercise and nutrition counseling were performed at this visit.  Due for annual labs in June 2023.  - Patient identified as having weight management issue.  Appropriate orders and counseling given.     Educated in proper administration of medication(s) ordered today including safety, possible SE, risks, benefits, rationale and alternatives to therapy.   Supportive care, differential diagnoses, and indications for immediate follow-up discussed with patient.    Pathogenesis of diagnosis discussed including typical length and natural progression.    Instructed to  return to clinic or nearest emergency department for any change in condition, further concerns, or worsening of symptoms.  Patient states understanding of the plan of care and discharge instructions.    Consent for records release signed to order medical records from Dr. Sebastián Alvarado.    Return in about 6 months (around 12/6/2022) for Follow-up, As needed.    Please note that this dictation was created using voice recognition software. I have made every reasonable attempt to correct obvious errors, but I expect that there are errors of grammar and possibly content that I did not discover before finalizing the note.

## 2022-06-06 NOTE — ASSESSMENT & PLAN NOTE
Continues pravastatin 40 mg daily, denies side effects of the medication.  Due for annual labs in June 2023.

## 2022-06-06 NOTE — ASSESSMENT & PLAN NOTE
Resolved on most recent labs, GFR 61.  Patient's GFR has ranged from 46 to greater than 60 since 2016.  She does continue piroxicam 10 mg twice daily for arthritis pain.  Since becoming aware of chronic kidney disease she has been more aware of water intake and hydration status.  Due for annual labs in June 2023.

## 2022-06-13 DIAGNOSIS — N89.8 VAGINAL ITCHING: ICD-10-CM

## 2022-06-13 RX ORDER — NYSTATIN 100000 [USP'U]/G
POWDER TOPICAL
Qty: 60 G | Refills: 0 | Status: SHIPPED | OUTPATIENT
Start: 2022-06-13 | End: 2022-12-07 | Stop reason: SDUPTHER

## 2022-06-13 NOTE — TELEPHONE ENCOUNTER
Requested Prescriptions     Signed Prescriptions Disp Refills   • nystatin (MYCOSTATIN) powder 60 g 0     Sig: APPLY TO AFFECTED AREA 3 TIMES A DAY FOR 7 DAYS     Authorizing Provider: JAVIER ISSA A.P.R.N.

## 2022-06-15 PROBLEM — E66.01 MORBID OBESITY (HCC): Status: ACTIVE | Noted: 2022-06-15

## 2022-06-15 PROBLEM — E03.8 OTHER SPECIFIED HYPOTHYROIDISM: Status: ACTIVE | Noted: 2020-06-23

## 2022-06-17 ENCOUNTER — HOSPITAL ENCOUNTER (OUTPATIENT)
Dept: RADIOLOGY | Facility: MEDICAL CENTER | Age: 72
End: 2022-06-17
Attending: NURSE PRACTITIONER
Payer: MEDICARE

## 2022-06-17 DIAGNOSIS — Z12.31 VISIT FOR SCREENING MAMMOGRAM: ICD-10-CM

## 2022-06-17 PROCEDURE — 77063 BREAST TOMOSYNTHESIS BI: CPT

## 2022-07-19 DIAGNOSIS — M85.80 OSTEOPENIA, UNSPECIFIED LOCATION: ICD-10-CM

## 2022-07-19 RX ORDER — ALENDRONATE SODIUM 70 MG/1
TABLET ORAL
Qty: 12 TABLET | Refills: 3 | Status: SHIPPED | OUTPATIENT
Start: 2022-07-19 | End: 2023-05-09

## 2022-07-19 NOTE — TELEPHONE ENCOUNTER
Requested Prescriptions     Pending Prescriptions Disp Refills   • alendronate (FOSAMAX) 70 MG Tab [Pharmacy Med Name: ALENDRONATE SODIUM 70 MG TAB] 12 Tablet 3     Si TAB ORALLY ONCE WEEKLY IN MORNING W/ 8 OZ OF WATER 30 MINS BEFORE 1ST MEAL, BEVERAGE, OR MED OF THE DAY,STAY UPRIGHT       SHYANN Hdez.

## 2022-10-21 ENCOUNTER — NON-PROVIDER VISIT (OUTPATIENT)
Dept: MEDICAL GROUP | Facility: PHYSICIAN GROUP | Age: 72
End: 2022-10-21
Payer: MEDICARE

## 2022-10-21 DIAGNOSIS — Z23 NEED FOR VACCINATION: ICD-10-CM

## 2022-10-21 PROCEDURE — G0008 ADMIN INFLUENZA VIRUS VAC: HCPCS | Performed by: FAMILY MEDICINE

## 2022-10-21 PROCEDURE — 90662 IIV NO PRSV INCREASED AG IM: CPT | Performed by: FAMILY MEDICINE

## 2022-10-21 NOTE — PROGRESS NOTES
"Deneen Tyson is a 72 y.o. female here for a non-provider visit for:   FLU    Reason for immunization: Annual Flu Vaccine  Immunization records indicate need for vaccine: Yes, confirmed with Epic and confirmed with [place] [employer]  Minimum interval has been met for this vaccine: Yes  ABN completed: Not Indicated    VIS Dated  8/6/21 was given to patient: Yes  All IAC Questionnaire questions were answered \"No.\"    Patient tolerated injection and no adverse effects were observed or reported: Yes    Pt scheduled for next dose in series: No  " slipping, stumbling. tripping

## 2022-12-01 ENCOUNTER — TELEPHONE (OUTPATIENT)
Dept: MEDICAL GROUP | Facility: PHYSICIAN GROUP | Age: 72
End: 2022-12-01
Payer: MEDICARE

## 2022-12-01 NOTE — TELEPHONE ENCOUNTER
Future Appointments         Provider Department Center    12/7/2022 11:20 AM (Arrive by 11:05 AM) ADENIKE Hdez [facility]          ESTABLISHED PATIENT PRE-VISIT PLANNING     Patient was NOT contacted to complete PVP.     Note: Patient will not be contacted if there is no indication to call.     1.  Reviewed notes from the last few office visits within the medical group: Yes, LOV 06/06/2022    2.  If any orders were placed at last visit or intended to be done for this visit (i.e. 6 mos follow-up), do we have Results/Consult Notes?           Labs - Labs were not ordered at last office visit.  Note: If patient appointment is for lab review and patient did not complete labs, check with provider if OK to reschedule patient until labs completed.         Imaging - Imaging was not ordered at last office visit.         Referrals - No referrals were ordered at last office visit.    3. Is this appointment scheduled as a Hospital Follow-Up? No    4.  Immunizations were updated in Epic using Reconcile Outside Information activity? Yes    5.  Patient is due for the following Health Maintenance Topics:   Health Maintenance Due   Topic Date Due    URINE DRUG SCREEN  Never done    COVID-19 Vaccine (1) Never done     6.  AHA (Pulse[employer]) form printed for Provider? N/A

## 2022-12-07 ENCOUNTER — OFFICE VISIT (OUTPATIENT)
Dept: MEDICAL GROUP | Facility: PHYSICIAN GROUP | Age: 72
End: 2022-12-07
Payer: MEDICARE

## 2022-12-07 ENCOUNTER — HOSPITAL ENCOUNTER (OUTPATIENT)
Facility: MEDICAL CENTER | Age: 72
End: 2022-12-07
Attending: NURSE PRACTITIONER
Payer: MEDICARE

## 2022-12-07 VITALS
HEIGHT: 64 IN | SYSTOLIC BLOOD PRESSURE: 112 MMHG | HEART RATE: 81 BPM | TEMPERATURE: 97.6 F | BODY MASS INDEX: 36.88 KG/M2 | OXYGEN SATURATION: 97 % | WEIGHT: 216 LBS | DIASTOLIC BLOOD PRESSURE: 72 MMHG | RESPIRATION RATE: 13 BRPM

## 2022-12-07 DIAGNOSIS — N89.8 VAGINAL ITCHING: ICD-10-CM

## 2022-12-07 DIAGNOSIS — M54.42 CHRONIC LEFT-SIDED LOW BACK PAIN WITH LEFT-SIDED SCIATICA: ICD-10-CM

## 2022-12-07 DIAGNOSIS — Z00.00 ROUTINE HEALTH MAINTENANCE: ICD-10-CM

## 2022-12-07 DIAGNOSIS — G89.29 CHRONIC LEFT-SIDED LOW BACK PAIN WITH LEFT-SIDED SCIATICA: ICD-10-CM

## 2022-12-07 DIAGNOSIS — E66.01 MORBID OBESITY (HCC): ICD-10-CM

## 2022-12-07 DIAGNOSIS — E55.9 VITAMIN D DEFICIENCY: ICD-10-CM

## 2022-12-07 DIAGNOSIS — M25.531 RIGHT WRIST PAIN: ICD-10-CM

## 2022-12-07 DIAGNOSIS — E78.2 MIXED HYPERLIPIDEMIA: ICD-10-CM

## 2022-12-07 DIAGNOSIS — E03.8 OTHER SPECIFIED HYPOTHYROIDISM: ICD-10-CM

## 2022-12-07 PROCEDURE — 99214 OFFICE O/P EST MOD 30 MIN: CPT | Performed by: NURSE PRACTITIONER

## 2022-12-07 PROCEDURE — 80307 DRUG TEST PRSMV CHEM ANLYZR: CPT

## 2022-12-07 RX ORDER — METHOCARBAMOL 500 MG/1
500 TABLET, FILM COATED ORAL 4 TIMES DAILY PRN
Qty: 90 TABLET | Refills: 3 | Status: SHIPPED | OUTPATIENT
Start: 2022-12-07

## 2022-12-07 RX ORDER — ROSUVASTATIN CALCIUM 20 MG/1
20 TABLET, COATED ORAL EVERY EVENING
Qty: 100 TABLET | Refills: 3 | Status: SHIPPED | OUTPATIENT
Start: 2022-12-07 | End: 2023-10-11 | Stop reason: SDUPTHER

## 2022-12-07 RX ORDER — NYSTATIN 100000 [USP'U]/G
POWDER TOPICAL
Qty: 60 G | Refills: 6 | Status: SHIPPED | OUTPATIENT
Start: 2022-12-07

## 2022-12-07 ASSESSMENT — FIBROSIS 4 INDEX: FIB4 SCORE: 1.51

## 2022-12-07 NOTE — ASSESSMENT & PLAN NOTE
"Chronic, ongoing.  Continues to be affected intermittently with left-sided low back pain with left-sided sciatica.  When she has a flare she will experience immediate pain in her back in any upright position including sitting and standing that improves with lying supine.  During a flare she will experience muscle spasms and back pain.  Previously treated with tizanidine, heat, rest, and hydrocodone with prednisone for severe symptoms.  Reports that she feels \"loopy\" with tizanidine, has used Robaxin 500 mg in the past with good response and no drowsiness.  Continues to avoid sitting for long periods of time, lies down as needed.  "

## 2022-12-07 NOTE — ASSESSMENT & PLAN NOTE
New to examiner.  Patient reports right wrist pain with numbness, tingling, itching that has been ongoing for about 1 year.  She has been wearing a wrist brace at night

## 2022-12-09 LAB

## 2022-12-10 RX ORDER — HYDROCODONE BITARTRATE AND ACETAMINOPHEN 10; 325 MG/1; MG/1
.5-1 TABLET ORAL EVERY 8 HOURS PRN
Qty: 21 TABLET | Refills: 0 | Status: SHIPPED | OUTPATIENT
Start: 2022-12-10 | End: 2022-12-17

## 2022-12-10 NOTE — PROGRESS NOTES
"CC:   Chief Complaint   Patient presents with    Follow-Up     General follow up 6 months     HISTORY OF THE PRESENT ILLNESS: Patient is a 72 y.o. female. This pleasant patient is here today to request refill for pain medication and change muscle relaxer.  She is also experiencing right wrist pain ongoing for 1 year.    Health Maintenance: Reviewed     Chronic left-sided low back pain with left-sided sciatica  Chronic, ongoing.  Continues to be affected intermittently with left-sided low back pain with left-sided sciatica.  When she has a flare she will experience immediate pain in her back in any upright position including sitting and standing that improves with lying supine.  During a flare she will experience muscle spasms and back pain.  Previously treated with tizanidine, heat, rest, and hydrocodone with prednisone for severe symptoms.  Reports that she feels \"loopy\" with tizanidine, has used Robaxin 500 mg in the past with good response and no drowsiness.  Continues to avoid sitting for long periods of time, lies down as needed.    Right wrist pain  New to examiner.  Patient reports right wrist pain with numbness, tingling, itching that has been ongoing for about 1 year.  She has been wearing a wrist brace at night    Mixed hyperlipidemia  Chronic, ongoing since 1999.  Continues pravastatin 40 mg daily, denies side effects of the medication.  Due for updated annual labs prior to follow-up.    Allergies: Meloxicam and Silicone  Current Outpatient Medications Ordered in Epic   Medication Sig Dispense Refill    rosuvastatin (CRESTOR) 20 MG Tab Take 1 Tablet by mouth every evening. 100 Tablet 3    methocarbamol (ROBAXIN) 500 MG Tab Take 1 Tablet by mouth 4 times a day as needed (back spasm). 90 Tablet 3    nystatin (MYCOSTATIN) powder APPLY TO AFFECTED AREA 3 TIMES A DAY FOR 7 DAYS 60 g 6    alendronate (FOSAMAX) 70 MG Tab 1 TAB ORALLY ONCE WEEKLY IN MORNING W/ 8 OZ OF WATER 30 MINS BEFORE 1ST MEAL, BEVERAGE, OR " MED OF THE DAY,STAY UPRIGHT 12 Tablet 3    levothyroxine (SYNTHROID) 50 MCG Tab Take 1 Tablet by mouth every morning on an empty stomach. 90 Tablet 3    piroxicam (FELDENE) 10 MG Cap Take 1 Capsule by mouth 2 times daily with meals as needed (back pain). 180 Capsule 3    predniSONE (DELTASONE) 10 MG Tab Take with food. Take 6 pills on day 1, then 5 pills on days 2 and 3, then 4 pills on days 4 and 5, then 3 pills on days 6 and 7, then 2 pills on days 8 and 9, then 1 pill on day 10, then stop. 35 Tablet 1    ketoconazole (NIZORAL) 2 % Cream Apply 1 Squirt topically 2 times a day. Use on right external ear canal 15 g 1    triamcinolone (KENALOG) 0.1 % lotion Apply to scalp after shampooing with Selsun Blue shampoo 60 mL 5    aspirin 81 MG EC tablet Take 1 Tab by mouth 2 times a day, with meals. 90 Tab 0    vitamin D3, cholecalciferol, 1000 UNIT Tab Take 1 Tab by mouth every day. 100 Tab 3    ascorbic acid (ASCORBIC ACID) 500 MG Tab Take 1,000 mg by mouth every day.      BETA CAROTENE PO Take 10,000 Int'l Units by mouth every day.      B Complex-C-Folic Acid (STRESS B COMPLEX PO) Take  by mouth 2 Times a Day.      Calcium 1500 MG TABS Take  by mouth every day.       No current [facility]-ordered facility-administered medications on file.     Past Medical History:   Diagnosis Date    Arthritis     osteo - fingers, knees, back    Disorder of thyroid     hypoactive    Family history of diabetes mellitus 2/17/2015    High cholesterol     Hyperlipidemia     Pain 4/22/16    low back-chronic    Pain 1/27/17    right knee    Personal history of colonic polyps 2/12/2020    Goes to [employer].    Screening mammogram for high-risk patient 10/7/2019    Status post right knee replacement 1/30/2017     Past Surgical History:   Procedure Laterality Date    PB TOTAL KNEE ARTHROPLASTY Right 12/16/2020    Procedure: ARTHROPLASTY, KNEE, TOTAL;  Surgeon: Hugh Grover M.D.;  Location: SURGERY [facility];  Service: Orthopedics     "KNEE ARTHROSCOPY Right 1/30/2017    Procedure: KNEE ARTHROSCOPY;  Surgeon: Will Mattson M.D.;  Location: SURGERY [facility];  Service:     MENISCECTOMY, KNEE, MEDIAL  1/30/2017    Procedure: MEDIAL MENISCECTOMY - PARTIAL;  Surgeon: Will Mattson M.D.;  Location: SURGERY [facility];  Service:     TRIGGER FINGER RELEASE Left 4/27/2016    Procedure: TRIGGER FINGER RELEASE - THUMB;  Surgeon: Bhavin Barker M.D.;  Location: SURGERY [facility];  Service:     DEQUERVAINS RELEASE Right 3/2015    wrist    COLONOSCOPY  2009    SHOULDER SURGERY Left 2008    removal of bone spur left shoulder     Social History     Tobacco Use    Smoking status: Never    Smokeless tobacco: Never   Vaping Use    Vaping Use: Never used   Substance Use Topics    Alcohol use: No    Drug use: No     Social History     Social History Narrative    Not on file     Family History   Problem Relation Age of Onset    Stroke Father     Diabetes Father     Asthma Mother     Heart Attack Mother 55    Obesity Mother     No Known Problems Brother     No Known Problems Maternal Grandmother     Cancer Maternal Grandfather     No Known Problems Paternal Grandmother     No Known Problems Paternal Grandfather     Cancer Brother         throat cancer    Diabetes Son         type 1     ROS:   See HPI      Exam: /72   Pulse 81   Temp 36.4 °C (97.6 °F) (Temporal)   Resp 13   Ht 1.626 m (5' 4\")   Wt 98 kg (216 lb)   SpO2 97%  Body mass index is 37.08 kg/m².    General: Well nourished, well developed female in NAD, awake and conversant.  Eyes: Normal conjunctiva, anicteric.  Round symmetrical pupils.  ENT: Hearing grossly intact.  No nasal discharge.  Neck: Neck is supple.  No masses or thyromegaly.  CV: No lower extremity edema.  Respiratory: Respirations are nonlabored.  No wheezing.  Abdomen: Non-Distended.  Skin: Warm.  No rashes or ulcers.  MSK: Normal ambulation.  No clubbing or cyanosis.  Neuro: Sensation and CN " II-XII grossly normal.  Psych: Alert and oriented.  Cooperative, appropriate mood and affect, normal judgment.      Assessment/Plan:  1. Mixed hyperlipidemia  Chronic, ongoing.  Discontinue pravastatin and start rosuvastatin 20 mg daily.  Due for updated annual labs in June 2023.  - rosuvastatin (CRESTOR) 20 MG Tab; Take 1 Tablet by mouth every evening.  Dispense: 100 Tablet; Refill: 3  - Comp Metabolic Panel; Future  - Lipid Profile; Future  - TSH WITH REFLEX TO FT4; Future    2. Chronic left-sided low back pain with left-sided sciatica  Chronic, intermittent. Continue Norco 10-3 25, half-1 tablet every 8 hours as needed for flare.  Discontinue tizanidine as needed and start methocarbamol 500 mg 4 times daily as needed for muscle spasms.  UDS and Controlled Substance Treatment Agreement updated today.  Patient understands this prescription is a controlled substance which is potentially habit-forming and its use is regulated by the [place]. Refills are subject to terms of a controlled substance treatment agreement. Any refill requires a new prescription that must be obtained from this office during regular office hours. Patient advised that they must be seen in clinic every 90 days for refills. This medicine can cause nausea, significant constipation, sedation, confusion.   - Controlled Substance Treatment Agreement  - PAIN MANAGEMENT PANEL, SCRN W/ RFLX TO QNT; Future  - methocarbamol (ROBAXIN) 500 MG Tab; Take 1 Tablet by mouth 4 times a day as needed (back spasm).  Dispense: 90 Tablet; Refill: 3  - HYDROcodone/acetaminophen (NORCO)  MG Tab; Take 0.5-1 Tablets by mouth every 8 hours as needed for Severe Pain for up to 7 days.  Dispense: 21 Tablet; Refill: 0    3. Right wrist pain  New to examiner, ongoing problem for the patient for the last year.  Plan for patient to complete EMG/NCS of right upper extremity.  Follow-up to review results.  - Referral to Neurodiagnostics (EEG,EP,EMG/NCS/DBS)    4. Vaginal  itching  Patient requesting refill of nystatin powder for vaginal itching to be used 3 times daily for 7 days as needed, refill sent to pharmacy.  - nystatin (MYCOSTATIN) powder; APPLY TO AFFECTED AREA 3 TIMES A DAY FOR 7 DAYS  Dispense: 60 g; Refill: 6   - Comp Metabolic Panel; Future    5. Morbid obesity (HCC)  Encourage diet high in fruits, vegetables, and fiber. And a diet low in salt, refined carbohydrates, cholesterol, saturated fat, and trans fatty acids.    Encourage a minimum of 30 minutes of moderate intensity aerobic exercise (eg, brisk walking) is recommended on five days each week. Or 30 minutes of vigorous-intensity aerobic exercise (eg, jogging) on three days each week.   Patient's body mass index is 37.08 kg/m². Exercise and nutrition counseling were performed at this visit.  Due for updated annual labs in June 2023 prior to follow up.  - CBC WITH DIFFERENTIAL; Future  - Comp Metabolic Panel; Future  - Lipid Profile; Future  - TSH WITH REFLEX TO FT4; Future    6. Other specified hypothyroidism  Chronic, ongoing.  Continue levothyroxine 50 mcg daily, does not need a refill at this time. Due for updated annual labs in June 2023 prior to follow up.  - TSH WITH REFLEX TO FT4; Future    7. Vitamin D deficiency  Chronic, ongoing.  Continue over-the-counter vitamin D3 daily. Due for updated annual labs in June 2023 prior to follow up.  - VITAMIN D,25 HYDROXY (DEFICIENCY); Future    8. Routine health maintenance  Due for updated annual labs in June 2023 prior to follow up.  - CBC WITH DIFFERENTIAL; Future  - Comp Metabolic Panel; Future  - Lipid Profile; Future  - TSH WITH REFLEX TO FT4; Future  - VITAMIN D,25 HYDROXY (DEFICIENCY); Future     Educated in proper administration of medication(s) ordered today including safety, possible SE, risks, benefits, rationale and alternatives to therapy.   Supportive care, differential diagnoses, and indications for immediate follow-up discussed with patient.     Pathogenesis of diagnosis discussed including typical length and natural progression.    Instructed to return to clinic or nearest emergency department for any change in condition, further concerns, or worsening of symptoms.  Patient states understanding of the plan of care and discharge instructions.    Return in about 6 months (around 6/7/2023), or if symptoms worsen or fail to improve, for Follow up Labs.    Please note that this dictation was created using voice recognition software. I have made every reasonable attempt to correct obvious errors, but I expect that there are errors of grammar and possibly content that I did not discover before finalizing the note.

## 2022-12-13 ENCOUNTER — DOCUMENTATION (OUTPATIENT)
Dept: HEALTH INFORMATION MANAGEMENT | Facility: OTHER | Age: 72
End: 2022-12-13
Payer: MEDICARE

## 2022-12-19 NOTE — ASSESSMENT & PLAN NOTE
Chronic, ongoing since 1999.  Continues pravastatin 40 mg daily, denies side effects of the medication.  Due for updated annual labs prior to follow-up.

## 2023-01-12 ENCOUNTER — NON-PROVIDER VISIT (OUTPATIENT)
Dept: NEUROLOGY | Facility: MEDICAL CENTER | Age: 73
End: 2023-01-12
Attending: PSYCHIATRY & NEUROLOGY
Payer: MEDICARE

## 2023-01-12 DIAGNOSIS — R20.0 NUMBNESS: ICD-10-CM

## 2023-01-12 DIAGNOSIS — M25.531 RIGHT WRIST PAIN: ICD-10-CM

## 2023-01-12 PROCEDURE — 95885 MUSC TST DONE W/NERV TST LIM: CPT | Performed by: PSYCHIATRY & NEUROLOGY

## 2023-01-12 PROCEDURE — 95909 NRV CNDJ TST 5-6 STUDIES: CPT | Mod: 26 | Performed by: PSYCHIATRY & NEUROLOGY

## 2023-01-12 PROCEDURE — 95909 NRV CNDJ TST 5-6 STUDIES: CPT | Performed by: PSYCHIATRY & NEUROLOGY

## 2023-01-12 PROCEDURE — 95885 MUSC TST DONE W/NERV TST LIM: CPT | Mod: 26 | Performed by: PSYCHIATRY & NEUROLOGY

## 2023-01-12 NOTE — PROCEDURES
"NERVE CONDUCTION STUDIES AND ELECTROMYOGRAPHY REPORT  [facility]  01/12/23          IMPRESSION:  This is an abnormal study.  There is electrophysiologic consistent with moderate RIGHT median neuropathy at the wrist (carpal tunnel syndrome).  Clinical correlation recommended.      Nina Coronado MD  Neurology - Neurophysiology              REASON FOR REFERRAL:  Ms. Deneen Tyson 72 y.o. referred by KHALIF Medina for evaluation of numbness and right wrist pain for 1 year.  Patient has had a history of right carpal tunnel without prior surgical intervention.  Symptoms have been worsening despite use of a wrist brace.    Height: 5'4\"  Weight: 216 lbs      ELECTRODIAGNOSTIC EXAMINATION:  Nerve conduction studies (NCS) and electromyography (EMG) are utilized to evaluate direct or indirect damage to the peripheral nervous system. NCS are performed to measure the nerve(s) response(s) to electrostimulation across a given nerve segment. EMG evaluates the passive and active electrical activity of the muscle(s) in question.  Muscles are innervated by specific peripheral nerves and roots. Often times, several nerves the muscle to be examined in order to determine the presence or absence of the disease process. Furthermore, nerves and muscles may need to be tested in a [id number] comparison, as well as in additional extremities, as this may be crucial in characterizing the extent of the disease process, which may be diffuse or isolated and of varying degree of severity. The extent of the neurodiagnostic exam is justified as it may help arrive to a proper diagnosis, which ultimately may contribute to better management of the patient. Therefore, the nerves to muscles examined during the study were medically necessary.    Unless otherwise noted, temperature of the extremity(s) study was monitored before and during the examination and remained between 32 and 36 degrees C for the upper " extremities, and between 30 and 36 degrees C for the lower extremities. The patient tolerated testing well, without any complications.       NERVE CONDUCTION STUDY SUMMARY:  Selected nerves of the right upper extremity are studied.    Abnormal right median sensory response due to slowing.  Abnormal right median motor response at the APB due to prolonged distal latency.  Abnormal right median/ulnar palmar comparison due to prolonged median latency.  Normal right ulnar sensory and motor responses.      NEEDLE EMG SUMMARY:  Concentric needle study of selected right upper extremity muscles is performed.     Insertion activity is normal in all muscles sampled.   With activation, there are normal morphology (amplitude/duration) motor unit action potentials firing with normal recruitment in muscles tested.       PATIENT DATA TABLES  Nerve Conduction Studies     Stim Site NR Onset (ms) Norm Onset (ms) O-P Amp (µV) Norm O-P Amp Site1 Site2 Delta-P (ms) Dist (cm) Rakesh (m/s) Norm Rakesh (m/s)   Right Median Anti Sensory (2nd Digit)   Wrist    3.6 <3.8 12.9 >10 Wrist 2nd Digit 4.8 14.0 *29 >50   Right Ulnar Anti Sensory (5th Digit)   Wrist    2.1 <3.2 19.5 >5 Wrist 5th Digit 2.8 14.0 50 >50        Stim Site NR Onset (ms) Norm Onset (ms) O-P Amp (mV) Norm O-P Amp Site1 Site2 Delta-0 (ms) Dist (cm) Rakesh (m/s) Norm Rakesh (m/s)   Right Median Motor (Abd Poll Brev)   Wrist    *5.3 <4 6.4 >5 Elbow Wrist 4.4 23.0 52 >50   Elbow    9.7  5.9          Right Ulnar Motor (Abd Dig Min)   Wrist    2.3 <3.1 8.1 >7 B Elbow Wrist 3.3 17.0 52 >50   B Elbow    5.6  8.0  A Elbow B Elbow 1.8 10.0 56    A Elbow    7.4  7.7               Stim Site NR Peak (ms) Norm Peak (ms) P-T Amp (µV) Site1 Site2 Delta-P (ms) Norm Delta (ms)   Right Median/Ulnar Palm Comparison (Wrist - 8cm)   Median Palm    *3.2 <2.3 4.2 Median Palm Ulnar Palm *1.6 <0.3   Ulnar Palm    1.6 <2.3 8.3                      Electromyography     Side Muscle Nerve Root Ins Act Fibs Psw Amp Dur  Poly Recrt Int Pat Comment   Right 1stDorInt Ulnar C8-T1 Nml Nml Nml Nml Nml 0 Nml Nml    Right Abd Poll Brev Median C8-T1 Nml Nml Nml Nml Nml 0 Nml Nml

## 2023-01-19 DIAGNOSIS — G56.01 MILD CARPAL TUNNEL SYNDROME OF RIGHT WRIST: ICD-10-CM

## 2023-04-21 PROBLEM — M51.369 DDD (DEGENERATIVE DISC DISEASE), LUMBAR: Status: ACTIVE | Noted: 2023-04-21

## 2023-04-21 PROBLEM — R94.30 NONSPECIFIC ABNORMAL FUNCTION STUDY, CARDIOVASCULAR: Status: ACTIVE | Noted: 2023-04-21

## 2023-04-21 PROBLEM — M51.36 DDD (DEGENERATIVE DISC DISEASE), LUMBAR: Status: ACTIVE | Noted: 2023-04-21

## 2023-04-23 DIAGNOSIS — M47.816 OSTEOARTHRITIS OF LUMBAR SPINE, UNSPECIFIED SPINAL OSTEOARTHRITIS COMPLICATION STATUS: ICD-10-CM

## 2023-04-24 PROBLEM — N18.31 STAGE 3A CHRONIC KIDNEY DISEASE: Status: RESOLVED | Noted: 2021-06-02 | Resolved: 2023-04-24

## 2023-04-27 RX ORDER — PIROXICAM 10 MG/1
CAPSULE ORAL
Qty: 180 CAPSULE | Refills: 0 | Status: SHIPPED | OUTPATIENT
Start: 2023-04-27 | End: 2023-05-09 | Stop reason: SDUPTHER

## 2023-04-27 NOTE — TELEPHONE ENCOUNTER
Requested Prescriptions     Pending Prescriptions Disp Refills   • piroxicam (FELDENE) 10 MG Cap [Pharmacy Med Name: PIROXICAM 10 MG CAPSULE] 180 Capsule 0     Sig: TAKE 1 CAPSULE BY MOUTH TWICE A DAY WITH MEALS AS NEEDED BACKPAIN       SHYANN Hdez.

## 2023-05-03 ENCOUNTER — HOSPITAL ENCOUNTER (OUTPATIENT)
Dept: LAB | Facility: MEDICAL CENTER | Age: 73
End: 2023-05-03
Attending: NURSE PRACTITIONER
Payer: MEDICARE

## 2023-05-03 DIAGNOSIS — E03.8 OTHER SPECIFIED HYPOTHYROIDISM: ICD-10-CM

## 2023-05-03 DIAGNOSIS — E78.2 MIXED HYPERLIPIDEMIA: ICD-10-CM

## 2023-05-03 DIAGNOSIS — E66.01 MORBID OBESITY (HCC): ICD-10-CM

## 2023-05-03 DIAGNOSIS — Z00.00 ROUTINE HEALTH MAINTENANCE: ICD-10-CM

## 2023-05-03 DIAGNOSIS — E55.9 VITAMIN D DEFICIENCY: ICD-10-CM

## 2023-05-03 DIAGNOSIS — N89.8 VAGINAL ITCHING: ICD-10-CM

## 2023-05-03 LAB
25(OH)D3 SERPL-MCNC: 69 NG/ML (ref 30–100)
ALBUMIN SERPL BCP-MCNC: 4.1 G/DL (ref 3.2–4.9)
ALBUMIN/GLOB SERPL: 1.6 G/DL
ALP SERPL-CCNC: 61 U/L (ref 30–99)
ALT SERPL-CCNC: 25 U/L (ref 2–50)
ANION GAP SERPL CALC-SCNC: 10 MMOL/L (ref 7–16)
AST SERPL-CCNC: 28 U/L (ref 12–45)
BASOPHILS # BLD AUTO: 1.2 % (ref 0–1.8)
BASOPHILS # BLD: 0.06 K/UL (ref 0–0.12)
BILIRUB SERPL-MCNC: 0.7 MG/DL (ref 0.1–1.5)
BUN SERPL-MCNC: 21 MG/DL (ref 8–22)
CALCIUM ALBUM COR SERPL-MCNC: 9.5 MG/DL (ref 8.5–10.5)
CALCIUM SERPL-MCNC: 9.6 MG/DL (ref 8.5–10.5)
CHLORIDE SERPL-SCNC: 106 MMOL/L (ref 96–112)
CHOLEST SERPL-MCNC: 146 MG/DL (ref 100–199)
CO2 SERPL-SCNC: 24 MMOL/L (ref 20–33)
CREAT SERPL-MCNC: 1.08 MG/DL (ref 0.5–1.4)
EOSINOPHIL # BLD AUTO: 0.17 K/UL (ref 0–0.51)
EOSINOPHIL NFR BLD: 3.5 % (ref 0–6.9)
ERYTHROCYTE [DISTWIDTH] IN BLOOD BY AUTOMATED COUNT: 44.7 FL (ref 35.9–50)
FASTING STATUS PATIENT QL REPORTED: NORMAL
GFR SERPLBLD CREATININE-BSD FMLA CKD-EPI: 55 ML/MIN/1.73 M 2
GLOBULIN SER CALC-MCNC: 2.6 G/DL (ref 1.9–3.5)
GLUCOSE SERPL-MCNC: 90 MG/DL (ref 65–99)
HCT VFR BLD AUTO: 49.9 % (ref 37–47)
HDLC SERPL-MCNC: 45 MG/DL
HGB BLD-MCNC: 15.6 G/DL (ref 12–16)
IMM GRANULOCYTES # BLD AUTO: 0.02 K/UL (ref 0–0.11)
IMM GRANULOCYTES NFR BLD AUTO: 0.4 % (ref 0–0.9)
LDLC SERPL CALC-MCNC: 66 MG/DL
LYMPHOCYTES # BLD AUTO: 1.29 K/UL (ref 1–4.8)
LYMPHOCYTES NFR BLD: 26.6 % (ref 22–41)
MCH RBC QN AUTO: 27.8 PG (ref 27–33)
MCHC RBC AUTO-ENTMCNC: 31.3 G/DL (ref 33.6–35)
MCV RBC AUTO: 88.8 FL (ref 81.4–97.8)
MONOCYTES # BLD AUTO: 0.49 K/UL (ref 0–0.85)
MONOCYTES NFR BLD AUTO: 10.1 % (ref 0–13.4)
NEUTROPHILS # BLD AUTO: 2.82 K/UL (ref 2–7.15)
NEUTROPHILS NFR BLD: 58.2 % (ref 44–72)
NRBC # BLD AUTO: 0 K/UL
NRBC BLD-RTO: 0 /100 WBC
PLATELET # BLD AUTO: 197 K/UL (ref 164–446)
PMV BLD AUTO: 10.4 FL (ref 9–12.9)
POTASSIUM SERPL-SCNC: 4.5 MMOL/L (ref 3.6–5.5)
PROT SERPL-MCNC: 6.7 G/DL (ref 6–8.2)
RBC # BLD AUTO: 5.62 M/UL (ref 4.2–5.4)
SODIUM SERPL-SCNC: 140 MMOL/L (ref 135–145)
TRIGL SERPL-MCNC: 177 MG/DL (ref 0–149)
TSH SERPL DL<=0.005 MIU/L-ACNC: 3.08 UIU/ML (ref 0.38–5.33)
WBC # BLD AUTO: 4.9 K/UL (ref 4.8–10.8)

## 2023-05-03 PROCEDURE — 80061 LIPID PANEL: CPT

## 2023-05-03 PROCEDURE — 84443 ASSAY THYROID STIM HORMONE: CPT

## 2023-05-03 PROCEDURE — 36415 COLL VENOUS BLD VENIPUNCTURE: CPT

## 2023-05-03 PROCEDURE — 85025 COMPLETE CBC W/AUTO DIFF WBC: CPT

## 2023-05-03 PROCEDURE — 82306 VITAMIN D 25 HYDROXY: CPT

## 2023-05-03 PROCEDURE — 80053 COMPREHEN METABOLIC PANEL: CPT

## 2023-05-09 ENCOUNTER — OFFICE VISIT (OUTPATIENT)
Dept: MEDICAL GROUP | Facility: PHYSICIAN GROUP | Age: 73
End: 2023-05-09
Payer: MEDICARE

## 2023-05-09 VITALS
HEART RATE: 86 BPM | WEIGHT: 215.3 LBS | HEIGHT: 64 IN | SYSTOLIC BLOOD PRESSURE: 124 MMHG | OXYGEN SATURATION: 97 % | RESPIRATION RATE: 18 BRPM | DIASTOLIC BLOOD PRESSURE: 70 MMHG | BODY MASS INDEX: 36.76 KG/M2 | TEMPERATURE: 97.7 F

## 2023-05-09 DIAGNOSIS — E66.01 MORBID OBESITY (HCC): ICD-10-CM

## 2023-05-09 DIAGNOSIS — M47.816 OSTEOARTHRITIS OF LUMBAR SPINE, UNSPECIFIED SPINAL OSTEOARTHRITIS COMPLICATION STATUS: ICD-10-CM

## 2023-05-09 DIAGNOSIS — N18.31 CHRONIC RENAL FAILURE, STAGE 3A: ICD-10-CM

## 2023-05-09 DIAGNOSIS — E03.8 OTHER SPECIFIED HYPOTHYROIDISM: ICD-10-CM

## 2023-05-09 DIAGNOSIS — Z76.89 ENCOUNTER TO ESTABLISH CARE: ICD-10-CM

## 2023-05-09 DIAGNOSIS — L29.9 ITCHING OF EAR: ICD-10-CM

## 2023-05-09 DIAGNOSIS — M85.88 OSTEOPENIA OF LUMBAR SPINE: ICD-10-CM

## 2023-05-09 PROBLEM — L98.9 SKIN LESION OF RIGHT LEG: Status: RESOLVED | Noted: 2020-06-23 | Resolved: 2023-05-09

## 2023-05-09 PROBLEM — R94.30 NONSPECIFIC ABNORMAL FUNCTION STUDY, CARDIOVASCULAR: Status: RESOLVED | Noted: 2023-04-21 | Resolved: 2023-05-09

## 2023-05-09 PROBLEM — R21 RASH: Status: RESOLVED | Noted: 2020-06-23 | Resolved: 2023-05-09

## 2023-05-09 PROCEDURE — 99214 OFFICE O/P EST MOD 30 MIN: CPT | Performed by: FAMILY MEDICINE

## 2023-05-09 RX ORDER — PIROXICAM 10 MG/1
CAPSULE ORAL
Qty: 180 CAPSULE | Refills: 3 | Status: SHIPPED | OUTPATIENT
Start: 2023-05-09 | End: 2024-01-25 | Stop reason: SDUPTHER

## 2023-05-09 RX ORDER — KETOCONAZOLE 20 MG/G
1 CREAM TOPICAL 2 TIMES DAILY
Qty: 15 G | Refills: 3 | Status: SHIPPED | OUTPATIENT
Start: 2023-05-09

## 2023-05-09 ASSESSMENT — FIBROSIS 4 INDEX: FIB4 SCORE: 2.05

## 2023-05-09 NOTE — ASSESSMENT & PLAN NOTE
Patient is here today to establish care.  She also wants to go over recent lab test results.  She needs a couple of medications refilled.  No particular concerns on today's visit.  She does have a opioid agreement form but she rarely uses the medication unless her back is very painful.  She is current on her drug screen as well.

## 2023-05-09 NOTE — ASSESSMENT & PLAN NOTE
This is a chronic problem.  Patient's recent DEXA scan showed osteopenia to lumbar spine.  She had just completed her 5-year course of biphosphonate's this year.  I told her to wait 1 to 2 years before repeating the DEXA scan.

## 2023-05-09 NOTE — PROGRESS NOTES
Annual Health Assessment Questions:    1.  Are you currently engaging in any exercise or physical activity? No    2.  How would you describe your mood or emotional well-being today? good    3.  Have you had any falls in the last year? No    4.  Have you noticed any problems with your balance or had difficulty walking? No    5.  In the last six months have you experienced any leakage of urine? No    6. DPA/Advanced Directive: Patient has Advance Directive on file.

## 2023-05-09 NOTE — ASSESSMENT & PLAN NOTE
This is a chronic problem.  Patient does use opioids on rare occasions when her back is bad.  She was told when she needs her next refill she will have to come in for me to do that with her and to sign an agreement under me.

## 2023-05-09 NOTE — PROGRESS NOTES
Subjective:     CC: Here to establish care and discuss her various issues.    HPI:   eDneen presents today with the following medical concerns:    Encounter to establish care  Patient is here today to establish care.  She also wants to go over recent lab test results.  She needs a couple of medications refilled.  No particular concerns on today's visit.  She does have a opioid agreement form but she rarely uses the medication unless her back is very painful.  She is current on her drug screen as well.    Morbid obesity (HCC)  This is a chronic problem.  Patient is watching her diet and hopefully will get out exercise soon when the weather is better.    Other specified hypothyroidism  This is a chronic stable condition.  Recent TSH was within normal limits.    Osteopenia of lumbar spine  This is a chronic problem.  Patient's recent DEXA scan showed osteopenia to lumbar spine.  She had just completed her 5-year course of biphosphonate's this year.  I told her to wait 1 to 2 years before repeating the DEXA scan.    Degenerative arthritis of lumbar spine  This is a chronic problem.  Patient does use opioids on rare occasions when her back is bad.  She was told when she needs her next refill she will have to come in for me to do that with her and to sign an agreement under me.    Chronic renal failure, stage 3a (HCC)  This is a chronic stable condition.  We discussed avoiding NSAIDs as much as possible.  She does find that she has to take her piroxicam otherwise she cannot function.  So we will continue to monitor her renal function test.  If her GFR worsens then we will have to discontinue it.    Past Medical History:   Diagnosis Date    Arthritis     osteo - fingers, knees, back    Disorder of thyroid     hypoactive    Family history of diabetes mellitus 2/17/2015    High cholesterol     Hyperlipidemia     Pain 4/22/16    low back-chronic    Pain 1/27/17    right knee    Personal history of colonic polyps 2/12/2020     Goes to [employer].    Screening mammogram for high-risk patient 10/7/2019    Status post right knee replacement 1/30/2017       Social History     Tobacco Use    Smoking status: Never     Passive exposure: Never    Smokeless tobacco: Never   Vaping Use    Vaping Use: Never used   Substance Use Topics    Alcohol use: No    Drug use: No       Current Outpatient Medications Ordered in Epic   Medication Sig Dispense Refill    MAGNESIUM PO magnesium      POTASSIUM PO potassium      piroxicam (FELDENE) 10 MG Cap TAKE 1 CAPSULE BY MOUTH TWICE A DAY WITH MEALS AS NEEDED BACKPAIN 180 Capsule 3    ketoconazole (NIZORAL) 2 % Cream Apply 1 Squirt topically 2 times a day. Use on right external ear canal 15 g 3    HYDROcodone/acetaminophen (NORCO)  MG Tab Take 1 Tablet by mouth every 6 hours as needed.      rosuvastatin (CRESTOR) 20 MG Tab Take 1 Tablet by mouth every evening. 100 Tablet 3    methocarbamol (ROBAXIN) 500 MG Tab Take 1 Tablet by mouth 4 times a day as needed (back spasm). 90 Tablet 3    nystatin (MYCOSTATIN) powder APPLY TO AFFECTED AREA 3 TIMES A DAY FOR 7 DAYS 60 g 6    levothyroxine (SYNTHROID) 50 MCG Tab Take 1 Tablet by mouth every morning on an empty stomach. 90 Tablet 3    triamcinolone (KENALOG) 0.1 % lotion Apply to scalp after shampooing with Selsun Blue shampoo 60 mL 5    aspirin 81 MG EC tablet Take 1 Tab by mouth 2 times a day, with meals. 90 Tab 0    vitamin D3, cholecalciferol, 1000 UNIT Tab Take 1 Tab by mouth every day. 100 Tab 3    ascorbic acid (ASCORBIC ACID) 500 MG Tab Take 1,000 mg by mouth every day.      BETA CAROTENE PO Take 10,000 Int'l Units by mouth every day.      B Complex-C-Folic Acid (STRESS B COMPLEX PO) Take  by mouth 2 Times a Day.      Calcium 1500 MG TABS Take  by mouth every day.       No current [facility]-ordered facility-administered medications on file.       Allergies:  Meloxicam and Silicone    Health Maintenance: Completed    ROS:  Gen: no fevers/chills, no  "changes in weight  Eyes: no changes in vision  ENT: no sore throat, no hearing loss, no bloody nose  Pulm: no sob, no cough  CV: no chest pain, no palpitations  GI: no nausea/vomiting, no diarrhea  : no dysuria  MSk: no myalgias  Skin: no rash  Neuro: no headaches, no numbness/tingling  Heme/Lymph: no easy bruising      Objective:       Exam:  /70 (BP Location: Left arm, Patient Position: Sitting, BP Cuff Size: Large adult)   Pulse 86   Temp 36.5 °C (97.7 °F) (Temporal)   Resp 18   Ht 1.626 m (5' 4\")   Wt 97.7 kg (215 lb 4.8 oz)   SpO2 97%   BMI 36.96 kg/m²  Body mass index is 36.96 kg/m².    Gen: Alert and oriented, No apparent distress.  Eyes:   Extraocular motions intact.  No scleral icterus seen.  Ears:    Ear canals and TMs are clear.  Neck: Neck is supple without lymphadenopathy.  Thyroid exam is normal.  Lungs: Normal effort, CTA bilaterally, no wheezes, rhonchi, or rales  CV: Regular rate and rhythm. No murmurs, rubs, or gallops.  Abdomen: Soft, nontender, no organomegaly or masses.  Normal bowel sounds.  Ext: No clubbing, cyanosis, edema.  Neuro: Cranial nerves II through VIII are grossly intact.  No lateralized signs are seen.  Gait is normal.      Labs: Reviewed with patient    Assessment & Plan:     73 y.o. female with the following -     1. Encounter to establish care  Patient establish care with me today.  Health history reviewed.  Labs reviewed with her as well.  She also related that about a month or 2 ago she developed some severe upper abdominal pain after going to bed.  She got up and took Pepto-Bismol and it did not help.  It took about 3 hours before resolved.  She has not had recurrences.  She is never had this before.  I told her if it recurs then we need to do investigation to determine the cause otherwise we will monitor her for now.    2. Chronic renal failure, stage 3a (HCC)  This is a chronic problem.  Continue to monitor for worsening.  She may continue on her NSAID for " now unless her GFR worsens.    3. Osteoarthritis of lumbar spine, unspecified spinal osteoarthritis complication status  This is a chronic problem.  Continue to follow.  - piroxicam (FELDENE) 10 MG Cap; TAKE 1 CAPSULE BY MOUTH TWICE A DAY WITH MEALS AS NEEDED BACKPAIN  Dispense: 180 Capsule; Refill: 3    4. Itching of ear  This is a chronic problem.  Refill on her medication given.  - ketoconazole (NIZORAL) 2 % Cream; Apply 1 Squirt topically 2 times a day. Use on right external ear canal  Dispense: 15 g; Refill: 3    5. Morbid obesity (HCC)  This is a chronic problem.  Work on weight reduction through exercise and diet.  Will follow.    6. Other specified hypothyroidism  This is a chronic problem.  Continue current dose of medications.    7. Osteopenia of lumbar spine  This is a chronic problem.  Continue on calcium and vitamin D supplementation.  Stay physically active.  Recheck a DEXA scan in 1 to 2 years.      Return in about 6 months (around 11/9/2023).    Please note that this dictation was created using voice recognition software. I have made every reasonable attempt to correct obvious errors, but I expect that there are errors of grammar and possibly content that I did not discover before finalizing the note.

## 2023-05-09 NOTE — ASSESSMENT & PLAN NOTE
This is a chronic problem.  Patient is watching her diet and hopefully will get out exercise soon when the weather is better.

## 2023-05-09 NOTE — ASSESSMENT & PLAN NOTE
This is a chronic stable condition.  We discussed avoiding NSAIDs as much as possible.  She does find that she has to take her piroxicam otherwise she cannot function.  So we will continue to monitor her renal function test.  If her GFR worsens then we will have to discontinue it.

## 2023-06-27 ENCOUNTER — OFFICE VISIT (OUTPATIENT)
Dept: MEDICAL GROUP | Facility: PHYSICIAN GROUP | Age: 73
End: 2023-06-27
Payer: MEDICARE

## 2023-06-27 ENCOUNTER — HOSPITAL ENCOUNTER (OUTPATIENT)
Dept: RADIOLOGY | Facility: MEDICAL CENTER | Age: 73
End: 2023-06-27
Attending: FAMILY MEDICINE
Payer: MEDICARE

## 2023-06-27 VITALS
HEART RATE: 90 BPM | OXYGEN SATURATION: 95 % | SYSTOLIC BLOOD PRESSURE: 118 MMHG | HEIGHT: 64 IN | RESPIRATION RATE: 16 BRPM | DIASTOLIC BLOOD PRESSURE: 70 MMHG | TEMPERATURE: 98.2 F | WEIGHT: 216 LBS | BODY MASS INDEX: 36.88 KG/M2

## 2023-06-27 DIAGNOSIS — M79.662 PAIN AND SWELLING OF LEFT LOWER LEG: ICD-10-CM

## 2023-06-27 DIAGNOSIS — M79.89 PAIN AND SWELLING OF LEFT LOWER LEG: ICD-10-CM

## 2023-06-27 DIAGNOSIS — R05.1 ACUTE COUGH: ICD-10-CM

## 2023-06-27 PROBLEM — Z76.89 ENCOUNTER TO ESTABLISH CARE: Status: RESOLVED | Noted: 2023-05-09 | Resolved: 2023-06-27

## 2023-06-27 PROCEDURE — 99214 OFFICE O/P EST MOD 30 MIN: CPT | Performed by: FAMILY MEDICINE

## 2023-06-27 PROCEDURE — 93971 EXTREMITY STUDY: CPT | Mod: LT

## 2023-06-27 PROCEDURE — 3078F DIAST BP <80 MM HG: CPT | Performed by: FAMILY MEDICINE

## 2023-06-27 PROCEDURE — 3074F SYST BP LT 130 MM HG: CPT | Performed by: FAMILY MEDICINE

## 2023-06-27 ASSESSMENT — FIBROSIS 4 INDEX: FIB4 SCORE: 2.05

## 2023-06-27 NOTE — ASSESSMENT & PLAN NOTE
This is a new problem.  Patient just returned from a trip to [place] at other places where she was gone for 2 weeks.  Last Wednesday she started having swelling and pain in her left calf area.  She had been very active traveling around but also in cars and planes.  She was having troubles with pain in her knee due to all the walking.  Never had troubles with a DVT before.  She has been elevating it but that did not seem to help much.

## 2023-06-27 NOTE — PROGRESS NOTES
Subjective:     CC: Here for couple of issues.      HPI:   Deneen presents today with The following medical concerns:    Pain and swelling of left lower leg  This is a new problem.  Patient just returned from a trip to [place] at other places where she was gone for 2 weeks.  Last Wednesday she started having swelling and pain in her left calf area.  She had been very active traveling around but also in cars and planes.  She was having troubles with pain in her knee due to all the walking.  Never had troubles with a DVT before.  She has been elevating it but that did not seem to help much.    Acute cough  This is a new problem.  Patient states she has had a nonproductive cough for about 3 weeks.  She did call a TeleDoc was placed on amoxicillin but it is continuing.  Denies any fever or chills.    Past Medical History:   Diagnosis Date    Arthritis     osteo - fingers, knees, back    Disorder of thyroid     hypoactive    Family history of diabetes mellitus 2/17/2015    High cholesterol     Hyperlipidemia     Pain 4/22/16    low back-chronic    Pain 1/27/17    right knee    Personal history of colonic polyps 2/12/2020    Goes to [facility].    Screening mammogram for high-risk patient 10/7/2019    Status post right knee replacement 1/30/2017       Social History     Tobacco Use    Smoking status: Never     Passive exposure: Never    Smokeless tobacco: Never   Vaping Use    Vaping Use: Never used   Substance Use Topics    Alcohol use: No    Drug use: No       Current Outpatient Medications Ordered in Epic   Medication Sig Dispense Refill    econazole nitrate 1 % cream Apply 1 Application topically 2 times a day. 30 g 3    MAGNESIUM PO magnesium      POTASSIUM PO potassium      piroxicam (FELDENE) 10 MG Cap TAKE 1 CAPSULE BY MOUTH TWICE A DAY WITH MEALS AS NEEDED BACKPAIN 180 Capsule 3    ketoconazole (NIZORAL) 2 % Cream Apply 1 Squirt topically 2 times a day. Use on right external ear canal 15 g 3     "HYDROcodone/acetaminophen (NORCO)  MG Tab Take 1 Tablet by mouth every 6 hours as needed.      rosuvastatin (CRESTOR) 20 MG Tab Take 1 Tablet by mouth every evening. 100 Tablet 3    methocarbamol (ROBAXIN) 500 MG Tab Take 1 Tablet by mouth 4 times a day as needed (back spasm). 90 Tablet 3    nystatin (MYCOSTATIN) powder APPLY TO AFFECTED AREA 3 TIMES A DAY FOR 7 DAYS 60 g 6    levothyroxine (SYNTHROID) 50 MCG Tab Take 1 Tablet by mouth every morning on an empty stomach. 90 Tablet 3    triamcinolone (KENALOG) 0.1 % lotion Apply to scalp after shampooing with Selsun Blue shampoo 60 mL 5    aspirin 81 MG EC tablet Take 1 Tab by mouth 2 times a day, with meals. 90 Tab 0    vitamin D3, cholecalciferol, 1000 UNIT Tab Take 1 Tab by mouth every day. 100 Tab 3    ascorbic acid (ASCORBIC ACID) 500 MG Tab Take 1,000 mg by mouth every day.      BETA CAROTENE PO Take 10,000 Int'l Units by mouth every day.      B Complex-C-Folic Acid (STRESS B COMPLEX PO) Take  by mouth 2 Times a Day.      Calcium 1500 MG TABS Take  by mouth every day.       No current [facility]-ordered facility-administered medications on file.       Allergies:  Meloxicam and Silicone    Health Maintenance: Completed    ROS:  Gen: no fevers/chills, no changes in weight  Eyes: no changes in vision  ENT: no sore throat, no hearing loss, no bloody nose  Pulm: no sob,   CV: no chest pain, no palpitations  GI: no nausea/vomiting, no diarrhea  : no dysuria  MSk: no myalgias  Skin: no rash  Neuro: no headaches, no numbness/tingling  Heme/Lymph: no easy bruising      Objective:       Exam:  /70 (BP Location: Left arm, Patient Position: Sitting, BP Cuff Size: Adult)   Pulse 90   Temp 36.8 °C (98.2 °F) (Temporal)   Resp 16   Ht 1.626 m (5' 4\")   Wt 98 kg (216 lb)   SpO2 95%   BMI 37.08 kg/m²  Body mass index is 37.08 kg/m².    Gen: Alert and oriented, No apparent distress.  Lungs: Normal effort,   Ext: No clubbing, cyanosis.  Patient does have swelling to " the left calf that is tense.  No pitting is noted.  There is tenderness on palpation of the posterior aspect without any cords.        Assessment & Plan:     73 y.o. female with the following -     1. Pain and swelling of left lower leg  This is a new problem.  We will order a ultrasound stat to see if she has a DVT.  Then we will notify her of the results and recommendations.  It is complicated by the fact that she is due to have carpal tunnel surgery in 2 days.  She was told if she has a proximal DVT then we will probably have her postpone her surgery based on her surgeon's recommendations as we need to put her on anticoagulants.  If it is below the knee then we would recommend treatment for 3 months but we could start the day after her surgery.  - US-EXTREMITY VENOUS LOWER UNILAT LEFT; Future    2. Acute cough  This is a acute problem.  She states her phlegm is currently clear so she was given a little more time.  She was told to use over-the-counter Mucinex and drink plenty of fluids.  If she still coughing into the end of next week she is can call and I will put in a prescription for Zithromax for her.      No follow-ups on file.    Please note that this dictation was created using voice recognition software. I have made every reasonable attempt to correct obvious errors, but I expect that there are errors of grammar and possibly content that I did not discover before finalizing the note.

## 2023-06-27 NOTE — ASSESSMENT & PLAN NOTE
This is a new problem.  Patient states she has had a nonproductive cough for about 3 weeks.  She did call a TeleDoc was placed on amoxicillin but it is continuing.  Denies any fever or chills.

## 2023-07-05 RX ORDER — AZITHROMYCIN 250 MG/1
TABLET, FILM COATED ORAL
Qty: 6 TABLET | Refills: 0 | Status: SHIPPED | OUTPATIENT
Start: 2023-07-05 | End: 2023-10-11

## 2023-08-17 ENCOUNTER — PATIENT MESSAGE (OUTPATIENT)
Dept: MEDICAL GROUP | Facility: PHYSICIAN GROUP | Age: 73
End: 2023-08-17
Payer: MEDICARE

## 2023-08-17 DIAGNOSIS — E03.9 ACQUIRED HYPOTHYROIDISM: ICD-10-CM

## 2023-08-17 RX ORDER — LEVOTHYROXINE SODIUM 0.05 MG/1
50 TABLET ORAL
Qty: 90 TABLET | Refills: 3 | Status: SHIPPED | OUTPATIENT
Start: 2023-08-17

## 2023-10-11 ENCOUNTER — OFFICE VISIT (OUTPATIENT)
Dept: MEDICAL GROUP | Facility: PHYSICIAN GROUP | Age: 73
End: 2023-10-11
Payer: MEDICARE

## 2023-10-11 VITALS
HEIGHT: 64 IN | OXYGEN SATURATION: 94 % | DIASTOLIC BLOOD PRESSURE: 68 MMHG | HEART RATE: 74 BPM | RESPIRATION RATE: 18 BRPM | SYSTOLIC BLOOD PRESSURE: 124 MMHG | WEIGHT: 215.4 LBS | BODY MASS INDEX: 36.77 KG/M2 | TEMPERATURE: 97.6 F

## 2023-10-11 DIAGNOSIS — Z23 NEED FOR VACCINATION: ICD-10-CM

## 2023-10-11 DIAGNOSIS — Z91.89 LACK OF MOTIVATION: ICD-10-CM

## 2023-10-11 DIAGNOSIS — E78.2 MIXED HYPERLIPIDEMIA: ICD-10-CM

## 2023-10-11 DIAGNOSIS — M47.816 SPONDYLOSIS OF LUMBAR REGION WITHOUT MYELOPATHY OR RADICULOPATHY: ICD-10-CM

## 2023-10-11 DIAGNOSIS — M79.89 PAIN AND SWELLING OF LEFT LOWER LEG: ICD-10-CM

## 2023-10-11 DIAGNOSIS — L29.9 EAR ITCHING: ICD-10-CM

## 2023-10-11 DIAGNOSIS — M79.662 PAIN AND SWELLING OF LEFT LOWER LEG: ICD-10-CM

## 2023-10-11 PROBLEM — R05.1 ACUTE COUGH: Status: RESOLVED | Noted: 2023-06-27 | Resolved: 2023-10-11

## 2023-10-11 PROCEDURE — 90662 IIV NO PRSV INCREASED AG IM: CPT | Performed by: FAMILY MEDICINE

## 2023-10-11 PROCEDURE — G0008 ADMIN INFLUENZA VIRUS VAC: HCPCS | Performed by: FAMILY MEDICINE

## 2023-10-11 PROCEDURE — 3074F SYST BP LT 130 MM HG: CPT | Performed by: FAMILY MEDICINE

## 2023-10-11 PROCEDURE — 99214 OFFICE O/P EST MOD 30 MIN: CPT | Mod: 25 | Performed by: FAMILY MEDICINE

## 2023-10-11 PROCEDURE — 3078F DIAST BP <80 MM HG: CPT | Performed by: FAMILY MEDICINE

## 2023-10-11 RX ORDER — ROSUVASTATIN CALCIUM 20 MG/1
20 TABLET, COATED ORAL EVERY EVENING
Qty: 100 TABLET | Refills: 3 | Status: SHIPPED | OUTPATIENT
Start: 2023-10-11

## 2023-10-11 ASSESSMENT — FIBROSIS 4 INDEX: FIB4 SCORE: 2.05

## 2023-10-11 NOTE — PROGRESS NOTES
Subjective:     CC: Here for several issues.    HPI:   Deneen presents today with the following medical concerns:    Degenerative arthritis of lumbar spine  This is a chronic problem.  Patient states she has troubles with low back pain particularly when having to sit for long periods of time when traveling.  She has tried Bengay but that gets all over her fingers and causes troubles.  Recently she tried a Lidoderm patch and that worked very well.  She is asking if that safe to do so.    Mixed hyperlipidemia  This is a chronic problem.  Her lab tests are current.  She is asking for refill of her medications as it was under her previous provider.  That will be done today.    BMI 36.0-36.9,adult  This is a chronic problem.  Patient is asking about dieting and intermittent fasting.  We discussed this in detail.    Pain and swelling of left lower leg  This is a resolved problem.  When I saw her last she had some swelling to her left leg.  A ultrasound for DVT was negative.  It occurred after traveling on the airplane and most likely was due to sitting for prolonged periods of time.    Ear itching  This is a chronic problem.  Patient gets intermittently itching to the canals of her ears.  She has been using ketoconazole cream on it and this seems to help.  She wants to be looked at to make sure is not a fungal infection.    Lack of motivation  This is a chronic problem.  This started about 15 months ago when she retired from working.  She states she has a list of things she was wanting to get done but she has not done many of those at all.  She just finds that she is not very motivated to do anything.  No other signs of depression but she had read online that this could be a sign of depression.    Past Medical History:   Diagnosis Date    Arthritis     osteo - fingers, knees, back    Disorder of thyroid     hypoactive    Family history of diabetes mellitus 2/17/2015    High cholesterol     Hyperlipidemia     Pain 4/22/16     low back-chronic    Pain 1/27/17    right knee    Personal history of colonic polyps 2/12/2020    Goes to [facility].    Screening mammogram for high-risk patient 10/7/2019    Status post right knee replacement 1/30/2017       Social History     Tobacco Use    Smoking status: Never     Passive exposure: Never    Smokeless tobacco: Never   Vaping Use    Vaping Use: Never used   Substance Use Topics    Alcohol use: No    Drug use: No       Current Outpatient Medications Ordered in Epic   Medication Sig Dispense Refill    rosuvastatin (CRESTOR) 20 MG Tab Take 1 Tablet by mouth every evening. 100 Tablet 3    levothyroxine (SYNTHROID) 50 MCG Tab Take 1 Tablet by mouth every morning on an empty stomach. 90 Tablet 3    econazole nitrate 1 % cream Apply 1 Application topically 2 times a day. 30 g 3    MAGNESIUM PO magnesium      POTASSIUM PO potassium      piroxicam (FELDENE) 10 MG Cap TAKE 1 CAPSULE BY MOUTH TWICE A DAY WITH MEALS AS NEEDED BACKPAIN 180 Capsule 3    ketoconazole (NIZORAL) 2 % Cream Apply 1 Squirt topically 2 times a day. Use on right external ear canal 15 g 3    HYDROcodone/acetaminophen (NORCO)  MG Tab Take 1 Tablet by mouth every 6 hours as needed.      methocarbamol (ROBAXIN) 500 MG Tab Take 1 Tablet by mouth 4 times a day as needed (back spasm). 90 Tablet 3    nystatin (MYCOSTATIN) powder APPLY TO AFFECTED AREA 3 TIMES A DAY FOR 7 DAYS 60 g 6    triamcinolone (KENALOG) 0.1 % lotion Apply to scalp after shampooing with Selsun Blue shampoo 60 mL 5    aspirin 81 MG EC tablet Take 1 Tab by mouth 2 times a day, with meals. 90 Tab 0    vitamin D3, cholecalciferol, 1000 UNIT Tab Take 1 Tab by mouth every day. 100 Tab 3    ascorbic acid (ASCORBIC ACID) 500 MG Tab Take 1,000 mg by mouth every day.      BETA CAROTENE PO Take 10,000 Int'l Units by mouth every day.      B Complex-C-Folic Acid (STRESS B COMPLEX PO) Take  by mouth 2 Times a Day.      Calcium 1500 MG TABS Take  by mouth every day.    "    No current [facility]-ordered facility-administered medications on file.       Allergies:  Meloxicam and Silicone    Health Maintenance: Completed    ROS:  Gen: no fevers/chills, no changes in weight  Eyes: no changes in vision  ENT: no sore throat, no hearing loss, no bloody nose  Pulm: no sob, no cough  CV: no chest pain, no palpitations  GI: no nausea/vomiting, no diarrhea  : no dysuria  MSk: no myalgias  Skin: no rash  Neuro: no headaches, no numbness/tingling  Heme/Lymph: no easy bruising      Objective:       Exam:  /68 (BP Location: Left arm, Patient Position: Sitting, BP Cuff Size: Adult)   Pulse 74   Temp 36.4 °C (97.6 °F) (Temporal)   Resp 18   Ht 1.626 m (5' 4\")   Wt 97.7 kg (215 lb 6.4 oz)   SpO2 94%   BMI 36.97 kg/m²  Body mass index is 36.97 kg/m².    Gen: Alert and oriented, No apparent distress.  Ears:    Ear canals and TMs are clear.  Neck: Neck is supple without lymphadenopathy.  Lungs: Normal effort, CTA bilaterally, no wheezes, rhonchi, or rales  CV: Regular rate and rhythm. No murmurs, rubs, or gallops.  Ext: No clubbing, cyanosis, edema.  Psych: Patient is alert and cooperative.  No unusual thought Garret expressed.  Insight and judgment is good.  Does not appear to be overtly anxious or depressed on today's visit.    Labs: Reviewed    Assessment & Plan:     73 y.o. female with the following -     1. Need for vaccination  Vaccine given today.  - Influenza Vaccine, High Dose (65+ Only)    2. Mixed hyperlipidemia  This is a chronic problem.  Labs are current.  Medication renewed.  - rosuvastatin (CRESTOR) 20 MG Tab; Take 1 Tablet by mouth every evening.  Dispense: 100 Tablet; Refill: 3    3. Spondylosis of lumbar region without myelopathy or radiculopathy  This is a chronic problem.  I told her is fine to use a Lidoderm patch as it seems to help with her symptoms.  Also she can take a pillow with her or get a back support cushion.  She says diclofenac gel in the past has not been very " helpful.    4. Pain and swelling of left lower leg  This is a resolved problem.  Continue to follow.    5. Ear itching  This is a chronic problem.  I told her the itching is most likely due to drying of the skin.  She can try over-the-counter hydrocortisone cream as well.  Exam today did not reveal any evidence of infection.    6. Lack of motivation  This is a chronic problem.  We discussed possible causes.  We will have her first try over-the-counter Hillman's wort.  If that is not helpful we could use a medicine such as Cymbalta or Pristiq.    7. BMI 36.0-36.9,adult  This is a chronic problem.  Diet and exercise discussed.      Return in about 6 months (around 4/11/2024) for Long.    Please note that this dictation was created using voice recognition software. I have made every reasonable attempt to correct obvious errors, but I expect that there are errors of grammar and possibly content that I did not discover before finalizing the note.

## 2023-10-11 NOTE — ASSESSMENT & PLAN NOTE
This is a chronic problem.  Patient states she has troubles with low back pain particularly when having to sit for long periods of time when traveling.  She has tried Bengay but that gets all over her fingers and causes troubles.  Recently she tried a Lidoderm patch and that worked very well.  She is asking if that safe to do so.

## 2023-10-11 NOTE — ASSESSMENT & PLAN NOTE
This is a chronic problem.  This started about 15 months ago when she retired from working.  She states she has a list of things she was wanting to get done but she has not done many of those at all.  She just finds that she is not very motivated to do anything.  No other signs of depression but she had read online that this could be a sign of depression.

## 2023-10-11 NOTE — ASSESSMENT & PLAN NOTE
This is a chronic problem.  Patient is asking about dieting and intermittent fasting.  We discussed this in detail.

## 2023-10-11 NOTE — ASSESSMENT & PLAN NOTE
This is a chronic problem.  Patient gets intermittently itching to the canals of her ears.  She has been using ketoconazole cream on it and this seems to help.  She wants to be looked at to make sure is not a fungal infection.

## 2023-10-11 NOTE — ASSESSMENT & PLAN NOTE
This is a chronic problem.  Her lab tests are current.  She is asking for refill of her medications as it was under her previous provider.  That will be done today.

## 2023-10-11 NOTE — ASSESSMENT & PLAN NOTE
This is a resolved problem.  When I saw her last she had some swelling to her left leg.  A ultrasound for DVT was negative.  It occurred after traveling on the airplane and most likely was due to sitting for prolonged periods of time.

## 2023-10-20 ENCOUNTER — HOSPITAL ENCOUNTER (OUTPATIENT)
Dept: RADIOLOGY | Facility: MEDICAL CENTER | Age: 73
End: 2023-10-20
Attending: FAMILY MEDICINE
Payer: MEDICARE

## 2023-10-20 DIAGNOSIS — Z12.31 ENCOUNTER FOR SCREENING MAMMOGRAM FOR BREAST CANCER: ICD-10-CM

## 2023-10-20 PROCEDURE — 77063 BREAST TOMOSYNTHESIS BI: CPT

## 2024-01-25 ENCOUNTER — PATIENT MESSAGE (OUTPATIENT)
Dept: MEDICAL GROUP | Facility: PHYSICIAN GROUP | Age: 74
End: 2024-01-25
Payer: MEDICARE

## 2024-01-25 DIAGNOSIS — M47.816 OSTEOARTHRITIS OF LUMBAR SPINE, UNSPECIFIED SPINAL OSTEOARTHRITIS COMPLICATION STATUS: ICD-10-CM

## 2024-01-25 RX ORDER — PIROXICAM 10 MG/1
CAPSULE ORAL
Qty: 180 CAPSULE | Refills: 0 | Status: SHIPPED | OUTPATIENT
Start: 2024-01-25

## 2024-02-12 ENCOUNTER — OFFICE VISIT (OUTPATIENT)
Dept: FAMILY PLANNING/WOMEN'S HEALTH CLINIC | Facility: PHYSICIAN GROUP | Age: 74
End: 2024-02-12
Attending: FAMILY MEDICINE
Payer: MEDICARE

## 2024-02-12 VITALS
WEIGHT: 200 LBS | DIASTOLIC BLOOD PRESSURE: 80 MMHG | SYSTOLIC BLOOD PRESSURE: 138 MMHG | HEIGHT: 64 IN | BODY MASS INDEX: 34.15 KG/M2

## 2024-02-12 DIAGNOSIS — Z79.899 MEDICATION MANAGEMENT: ICD-10-CM

## 2024-02-12 DIAGNOSIS — E03.8 OTHER SPECIFIED HYPOTHYROIDISM: ICD-10-CM

## 2024-02-12 DIAGNOSIS — F11.20 UNCOMPLICATED OPIOID DEPENDENCE (HCC): ICD-10-CM

## 2024-02-12 DIAGNOSIS — Z78.0 ENCOUNTER FOR OSTEOPOROSIS SCREENING IN ASYMPTOMATIC POSTMENOPAUSAL PATIENT: ICD-10-CM

## 2024-02-12 DIAGNOSIS — N18.31 CHRONIC RENAL FAILURE, STAGE 3A: ICD-10-CM

## 2024-02-12 DIAGNOSIS — M47.816 SPONDYLOSIS OF LUMBAR REGION WITHOUT MYELOPATHY OR RADICULOPATHY: ICD-10-CM

## 2024-02-12 DIAGNOSIS — Z13.820 ENCOUNTER FOR OSTEOPOROSIS SCREENING IN ASYMPTOMATIC POSTMENOPAUSAL PATIENT: ICD-10-CM

## 2024-02-12 DIAGNOSIS — E78.2 MIXED HYPERLIPIDEMIA: ICD-10-CM

## 2024-02-12 PROBLEM — E66.01 MORBID OBESITY (HCC): Status: RESOLVED | Noted: 2022-06-15 | Resolved: 2024-02-12

## 2024-02-12 PROCEDURE — 3079F DIAST BP 80-89 MM HG: CPT

## 2024-02-12 PROCEDURE — 1125F AMNT PAIN NOTED PAIN PRSNT: CPT

## 2024-02-12 PROCEDURE — G0439 PPPS, SUBSEQ VISIT: HCPCS

## 2024-02-12 PROCEDURE — 3075F SYST BP GE 130 - 139MM HG: CPT

## 2024-02-12 SDOH — ECONOMIC STABILITY: HOUSING INSECURITY
IN THE LAST 12 MONTHS, WAS THERE A TIME WHEN YOU DID NOT HAVE A STEADY PLACE TO SLEEP OR SLEPT IN A SHELTER (INCLUDING NOW)?: NO

## 2024-02-12 SDOH — ECONOMIC STABILITY: TRANSPORTATION INSECURITY
IN THE PAST 12 MONTHS, HAS THE LACK OF TRANSPORTATION KEPT YOU FROM MEDICAL APPOINTMENTS OR FROM GETTING MEDICATIONS?: NO

## 2024-02-12 SDOH — ECONOMIC STABILITY: FOOD INSECURITY: WITHIN THE PAST 12 MONTHS, THE FOOD YOU BOUGHT JUST DIDN'T LAST AND YOU DIDN'T HAVE MONEY TO GET MORE.: NEVER TRUE

## 2024-02-12 SDOH — ECONOMIC STABILITY: FOOD INSECURITY: WITHIN THE PAST 12 MONTHS, YOU WORRIED THAT YOUR FOOD WOULD RUN OUT BEFORE YOU GOT MONEY TO BUY MORE.: NEVER TRUE

## 2024-02-12 SDOH — ECONOMIC STABILITY: INCOME INSECURITY: IN THE LAST 12 MONTHS, WAS THERE A TIME WHEN YOU WERE NOT ABLE TO PAY THE MORTGAGE OR RENT ON TIME?: NO

## 2024-02-12 SDOH — ECONOMIC STABILITY: HOUSING INSECURITY: IN THE LAST 12 MONTHS, HOW MANY PLACES HAVE YOU LIVED?: 1

## 2024-02-12 SDOH — ECONOMIC STABILITY: TRANSPORTATION INSECURITY
IN THE PAST 12 MONTHS, HAS LACK OF TRANSPORTATION KEPT YOU FROM MEETINGS, WORK, OR FROM GETTING THINGS NEEDED FOR DAILY LIVING?: NO

## 2024-02-12 SDOH — ECONOMIC STABILITY: INCOME INSECURITY: HOW HARD IS IT FOR YOU TO PAY FOR THE VERY BASICS LIKE FOOD, HOUSING, MEDICAL CARE, AND HEATING?: NOT HARD AT ALL

## 2024-02-12 ASSESSMENT — ACTIVITIES OF DAILY LIVING (ADL): BATHING_REQUIRES_ASSISTANCE: 0

## 2024-02-12 ASSESSMENT — FIBROSIS 4 INDEX: FIB4 SCORE: 2.05

## 2024-02-12 ASSESSMENT — PAIN SCALES - GENERAL: PAINLEVEL: 2=MINIMAL-SLIGHT

## 2024-02-12 ASSESSMENT — ENCOUNTER SYMPTOMS: GENERAL WELL-BEING: GOOD

## 2024-02-12 ASSESSMENT — PATIENT HEALTH QUESTIONNAIRE - PHQ9: CLINICAL INTERPRETATION OF PHQ2 SCORE: 0

## 2024-02-12 NOTE — ASSESSMENT & PLAN NOTE
Chronic, stable. The patient denies any symptoms at this time. No current symptoms. Discussed drinking plenty of water and avoiding nephrotoxins such as NSAIDs.  Follow-up at least annually.

## 2024-02-12 NOTE — ASSESSMENT & PLAN NOTE
Chronic, stable.  The patient has had low back pain since the 1990's.  She has been taking opioids for pain for several decades. Continue with current defined treatment plan: HYDROcodone/acetaminophen (NORCO)  MG Tab . Follow-up at least annually.

## 2024-02-12 NOTE — ASSESSMENT & PLAN NOTE
Chronic, stable. The patient is trying to avoid processed carbs.  The patient is also doing intermittent fasting. She is going to the gym 3 times per week. Follow-up at least annually.

## 2024-02-12 NOTE — ASSESSMENT & PLAN NOTE
Chronic, stable.  The patient has had low back pain since the 1990's.  Continue with current defined treatment plan: HYDROcodone/acetaminophen (NORCO)  MG Tab and methocarbamol (ROBAXIN) 500 MG Tab . Follow-up at least annually.

## 2024-02-12 NOTE — ASSESSMENT & PLAN NOTE
Discussed with the patient the importance of getting regular osteoporosis screenings. The patient verbalized understanding and agreement.  Orders for DS-BONE DENSITY STUDY (DEXA) placed.    Double O-Z Flap Text: The defect edges were debeveled with a #15 scalpel blade.  Given the location of the defect, shape of the defect and the proximity to free margins a Double O-Z flap was deemed most appropriate.  Using a sterile surgical marker, an appropriate transposition flap was drawn incorporating the defect and placing the expected incisions within the relaxed skin tension lines where possible. The area thus outlined was incised deep to adipose tissue with a #15 scalpel blade.  The skin margins were undermined to an appropriate distance in all directions utilizing iris scissors.

## 2024-02-12 NOTE — PROGRESS NOTES
Comprehensive Health Assessment Program     Deneen Temple is a 73 y.o. here for her comprehensive health assessment.    Patient Active Problem List    Diagnosis Date Noted    Medication management 02/12/2024    Opioid dependence (HCC) 02/12/2024    Ear itching 10/11/2023    Lack of motivation 10/11/2023    Pain and swelling of left lower leg 06/27/2023    Encounter for osteoporosis screening in asymptomatic postmenopausal patient 05/09/2023    DDD (degenerative disc disease), lumbar 04/21/2023    Mild carpal tunnel syndrome of right wrist 12/07/2022    Os trigonum 11/04/2021    Ankle instability, left 11/04/2021    Chronic renal failure, stage 3a (HCC) 06/02/2021    Arthritis     Other specified hypothyroidism 06/23/2020    Seasonal allergies 06/23/2020    Chronic left-sided low back pain with left-sided sciatica 07/08/2019    Seborrheic dermatitis of scalp 04/04/2018    Vitamin D deficiency 03/31/2017    BMI 34.0-34.9,adult 03/28/2017    Degenerative arthritis of lumbar spine 02/17/2015    Osteopenia of lumbar spine 02/17/2015    Mixed hyperlipidemia 02/17/2015       Current Outpatient Medications   Medication Sig Dispense Refill    piroxicam (FELDENE) 10 MG Cap TAKE 1 CAPSULE BY MOUTH TWICE A DAY WITH MEALS AS NEEDED BACKPAIN 180 Capsule 0    econazole nitrate 1 % cream Apply 1 Application topically 2 times a day. 30 g 0    rosuvastatin (CRESTOR) 20 MG Tab Take 1 Tablet by mouth every evening. 100 Tablet 3    levothyroxine (SYNTHROID) 50 MCG Tab Take 1 Tablet by mouth every morning on an empty stomach. 90 Tablet 3    MAGNESIUM PO magnesium      POTASSIUM PO potassium      ketoconazole (NIZORAL) 2 % Cream Apply 1 Squirt topically 2 times a day. Use on right external ear canal 15 g 3    HYDROcodone/acetaminophen (NORCO)  MG Tab Take 1 Tablet by mouth every 6 hours as needed.      methocarbamol (ROBAXIN) 500 MG Tab Take 1 Tablet by mouth 4 times a day as needed (back spasm). 90 Tablet 3     nystatin (MYCOSTATIN) powder APPLY TO AFFECTED AREA 3 TIMES A DAY FOR 7 DAYS 60 g 6    triamcinolone (KENALOG) 0.1 % lotion Apply to scalp after shampooing with Selsun Blue shampoo 60 mL 5    aspirin 81 MG EC tablet Take 1 Tab by mouth 2 times a day, with meals. 90 Tab 0    vitamin D3, cholecalciferol, 1000 UNIT Tab Take 1 Tab by mouth every day. 100 Tab 3    ascorbic acid (ASCORBIC ACID) 500 MG Tab Take 1,000 mg by mouth every day.      BETA CAROTENE PO Take 10,000 Int'l Units by mouth every day.      B Complex-C-Folic Acid (STRESS B COMPLEX PO) Take  by mouth 2 Times a Day.      Calcium 1500 MG TABS Take  by mouth every day.       No current facility-administered medications for this visit.          Current supplements as per medication list.     Allergies:   Meloxicam and Silicone  Social History     Tobacco Use    Smoking status: Never     Passive exposure: Never    Smokeless tobacco: Never   Vaping Use    Vaping Use: Never used   Substance Use Topics    Alcohol use: No    Drug use: No     Family History   Problem Relation Age of Onset    Stroke Father     Diabetes Father     Asthma Mother     Heart Attack Mother 55    Obesity Mother     No Known Problems Brother     No Known Problems Maternal Grandmother     Cancer Maternal Grandfather     No Known Problems Paternal Grandmother     No Known Problems Paternal Grandfather     Cancer Brother         throat cancer    Diabetes Son         type 1     Deneen  has a past medical history of Arthritis, Disorder of thyroid, Family history of diabetes mellitus (02/17/2015), High cholesterol, Hyperlipidemia, Morbid obesity (HCC) (06/15/2022), Pain (04/22/2016), Pain (01/27/2017), Personal history of colonic polyps (02/12/2020), Screening mammogram for high-risk patient (10/07/2019), and Status post right knee replacement (01/30/2017).   Past Surgical History:   Procedure Laterality Date    PB TOTAL KNEE ARTHROPLASTY Right 12/16/2020    Procedure: ARTHROPLASTY, KNEE, TOTAL;   Surgeon: Hugh Grover M.D.;  Location: [facility];  Service: Orthopedics    KNEE ARTHROSCOPY Right 1/30/2017    Procedure: KNEE ARTHROSCOPY;  Surgeon: Will Mattson M.D.;  Location: SURGERY [facility];  Service:     MENISCECTOMY, KNEE, MEDIAL  1/30/2017    Procedure: MEDIAL MENISCECTOMY - PARTIAL;  Surgeon: Will Mattson M.D.;  Location: SURGERY [facility];  Service:     TRIGGER FINGER RELEASE Left 4/27/2016    Procedure: TRIGGER FINGER RELEASE - THUMB;  Surgeon: Bhavin Barker M.D.;  Location: [facility];  Service:     DEQUERVAINS RELEASE Right 3/2015    wrist    COLONOSCOPY  2009    SHOULDER SURGERY Left 2008    removal of bone spur left shoulder       Screening:  In the last six months have you experienced any leakage of urine? No    Depression Screening  Little interest or pleasure in doing things?  0 - not at all  Feeling down, depressed , or hopeless? 0 - not at all  Patient Health Questionnaire Score: 0     If depressive symptoms identified deferred to follow up visit unless specifically addressed in assessment and plan.    Interpretation of PHQ-9 Total Score   Score Severity   1-4 No Depression   5-9 Mild Depression   10-14 Moderate Depression   15-19 Moderately Severe Depression   20-27 Severe Depression    Screening for Cognitive Impairment  Do you or any of your friends or family members have any concern about your memory? No  Three Minute Recall (Banana, Sunrise, Chair) 3/3    Jaylan clock face with all 12 numbers and set the hands to show 20 past 8.  Yes 5/5  Cognitive concerns identified deferred for follow up unless specifically addressed in assessment and plan.    Fall Risk Assessment  Has the patient had two or more falls in the last year or any fall with injury in the last year?  No    Safety Assessment  Do you always wear your seatbelt?  Yes  Any changes to home needed to function safely? No  Difficulty hearing.  No  Patient counseled about  all safety risks that were identified.    Functional Assessment ADLs  Are there any barriers preventing you from cooking for yourself or meeting nutritional needs?  No.    Are there any barriers preventing you from driving safely or obtaining transportation?  No.    Are there any barriers preventing you from using a telephone or calling for help?  No    Are there any barriers preventing you from shopping?  No.    Are there any barriers preventing you from taking care of your own finances?  No    Are there any barriers preventing you from managing your medications?  No    Are there any barriers preventing you from showering, bathing or dressing yourself? No    Are there any barriers preventing you from doing housework or laundry? No  Are there any barriers preventing you from using the toilet?No  Are you currently engaging in any exercise or physical activity?  Yes.      Self-Assessment of Health  What is your perception of your health? Good  Do you sleep more than six hours a night? Yes  In the past 7 days, how much did pain keep you from doing your normal work? Some  Do you spend quality time with family or friends (virtually or in person)? Yes  Do you usually eat a heart healthy diet that constists of a variety of fruits, vegetables, whole grains and fiber? Yes  Do you eat foods high in fat and/or Fast Food more than three times per week? No    Advance Care Planning  Do you have an Advance Directive, Living Will, Durable Power of , or POLST? Yes  Advance Directive       is on file      Health Maintenance Summary            Ordered - Bone Density Scan (Every 2 Years) Ordered on 2/12/2024 06/16/2021  DS-BONE DENSITY STUDY (DEXA)    03/21/2018  DS-BONE DENSITY STUDY (DEXA)              Overdue - Urine Drug Screening (Every 360 Days) Overdue since 12/2/2023 12/07/2022  PAIN MANAGEMENT PANEL, SCRN W/ RFLX TO QNT              Postponed - COVID-19 Vaccine (1) Postponed until 5/9/2024      No completion  history exists for this topic.              Colorectal Cancer Screening (Colonoscopy - Every 5 Years) Next due on 2020  REFERRAL TO GI FOR COLONOSCOPY    2019  OCCULT BLOOD FECES IMMUNOASSAY    2009  REFERRAL TO GI FOR COLONOSCOPY              Mammogram (Every 2 Years) Next due on 10/20/2025      10/20/2023  MA-SCREENING MAMMO BILAT W/TOMOSYNTHESIS W/CAD    2022  MA-SCREENING MAMMO BILAT W/TOMOSYNTHESIS W/CAD    2021  MA-SCREENING MAMMO BILAT W/TOMOSYNTHESIS W/CAD    2019  MA-SCREENING MAMMO BILAT W/TOMOSYNTHESIS W/CAD    2015  MA-SCREEN MAMMO W/CAD-BILAT    Only the first 5 history entries have been loaded, but more history exists.              IMM DTaP/Tdap/Td Vaccine (5 - Td or Tdap) Next due on 2030  Imm Admin: Tdap Vaccine    2009  Imm Admin: Tdap Vaccine    2007  Imm Admin: Tdap Vaccine    2007  Imm Admin: TD Vaccine              Hepatitis A Vaccine (Hep A) (Series Information) Aged Out      2004  Imm Admin: Hepatitis A Vaccine, Adult              Hepatitis B Vaccine (Hep B) (Series Information) Completed      2004  Imm Admin: Hepatitis B Vaccine (Adol/Adult)    2004  Imm Admin: Hepatitis B Vaccine (Adol/Adult)    2004  Imm Admin: Hepatitis B Vaccine (Adol/Adult)              Pneumococcal Vaccine: 65+ Years (Series Information) Completed      10/04/2017  Imm Admin: Pneumococcal polysaccharide vaccine (PPSV-23)    2016  Imm Admin: Pneumococcal Conjugate Vaccine (Prevnar/PCV-13)              Hepatitis C Screening  Tentatively Complete      2020  Hepatitis C Antibody component of HCV Scrn ( 9711-5780 1xLife)              Zoster (Shingles) Vaccines (Series Information) Completed      2020  Imm Admin: Zoster Vaccine Recombinant (RZV) (SHINGRIX)    2020  Imm Admin: Zoster Vaccine Recombinant (RZV) (SHINGRIX)    2012  Imm Admin: Zoster Vaccine Live (ZVL) (Zostavax)  - HISTORICAL DATA              Influenza Vaccine (Series Information) Completed      10/11/2023  Imm Admin: Influenza Vaccine Adult HD    10/21/2022  Imm Admin: Influenza Vaccine Adult HD    09/28/2021  Imm Admin: Influenza Vaccine Adult HD    11/16/2020  Imm Admin: Influenza Vaccine Adult HD    10/31/2019  Imm Admin: Influenza Vaccine Adult HD    Only the first 5 history entries have been loaded, but more history exists.              HPV Vaccines (Series Information) Aged Out      No completion history exists for this topic.              Polio Vaccine (Inactivated Polio) (Series Information) Aged Out      No completion history exists for this topic.              Meningococcal Immunization (Series Information) Aged Out      No completion history exists for this topic.              Discontinued - Cervical Cancer Screening  Discontinued        Frequency changed to Never automatically (Topic No Longer Applies)    09/03/2015  Done    09/03/2015  PAP IG, HPV-HR              Discontinued - Annual Wellness Visit  Discontinued      02/12/2024  Level of Service: [place] ANNUAL WELLNESS VISIT-INCLUDES PPPS SUBSEQUE*    04/21/2023  Level of Service: [place] ANNUAL WELLNESS VISIT-INCLUDES PPPS SUBSEQUE*    06/15/2022  Level of Service: [place] ANNUAL WELLNESS VISIT-INCLUDES PPPS SUBSEQUE*    08/12/2021  Level of Service: [place] ANNUAL WELLNESS VISIT-INCLUDES PPPS SUBSEQUE*                    Patient Care Team:  Adria Reid III, M.D. as PCP - General (Family Medicine)  Hugh Grover M.D. as Consulting Physician (Orthopedic Surgery)  Padmini Post O.D. as Consulting Physician (Optometry)  [facility] as Consulting Physician (Physical Therapy)  MARCE Onofre as Consulting Physician (Family Medicine)  Cecilio Hurley Ass't as [facility]       Financial Resource Strain: Low Risk  (2/12/2024)    Overall Financial Resource Strain (CARDIA)     Difficulty of Paying Living Expenses: Not hard at all  "     Transportation Needs: No Transportation Needs (2/12/2024)    PRAPARE - Transportation     Lack of Transportation (Medical): No     Lack of Transportation (Non-Medical): No      Food Insecurity: No Food Insecurity (2/12/2024)    Hunger Vital Sign     Worried About Running Out of Food in the Last Year: Never true     Ran Out of Food in the Last Year: Never true        Encounter Vitals  Blood Pressure : 138/80  O2 Delivery Device: None - Room Air  Weight: 90.7 kg (200 lb)  Height: 162.6 cm (5' 4\")  BMI (Calculated): 34.33  Pain Score: 2=Minimal-Slight  DME  O2 Delivery Device: None - Room Air     Alert, oriented in no acute distress.  Eye contact is good, speech goal directed, affect calm.    Assessment and Plan. The following treatment and monitoring plan is recommended:  Mixed hyperlipidemia  Chronic, stable. The patient denies any side effects from the current medication. Continue with current defined treatment plan: rosuvastatin (CRESTOR) 20 MG Tab . Follow-up at least annually.      BMI 34.0-34.9,adult  Chronic, stable. The patient is trying to avoid processed carbs.  The patient is also doing intermittent fasting. She is going to the gym 3 times per week. Follow-up at least annually.      Chronic renal failure, stage 3a (HCC)  Chronic, stable. The patient denies any symptoms at this time. No current symptoms. Discussed drinking plenty of water and avoiding nephrotoxins such as NSAIDs.  Follow-up at least annually.      Other specified hypothyroidism  Chronic, stable. The patient denies any symptoms at this time. Continue with current defined treatment plan: levothyroxine (SYNTHROID) 50 MCG Tab . Follow-up at least annually.      Degenerative arthritis of lumbar spine  Chronic, stable.  The patient has had low back pain since the 1990's.  Continue with current defined treatment plan: HYDROcodone/acetaminophen (NORCO)  MG Tab and methocarbamol (ROBAXIN) 500 MG Tab . Follow-up at least " annually.      Encounter for osteoporosis screening in asymptomatic postmenopausal patient  Discussed with the patient the importance of getting regular osteoporosis screenings. The patient verbalized understanding and agreement.  Orders for DS-BONE DENSITY STUDY (DEXA) placed.     Medication management  The patient had questions about her medications. Will place referral to pharmacy.     Opioid dependence (HCC)  Chronic, stable.  The patient has had low back pain since the 1990's.  She has been taking opioids for pain for several decades. Continue with current defined treatment plan: HYDROcodone/acetaminophen (NORCO)  MG Tab . Follow-up at least annually.       Services suggested: No services needed at this time  Health Care Screening: Age-appropriate preventive services recommended by USPTF and ACIP covered by Medicare were discussed today. Services ordered if indicated and agreed upon by the patient.  Referrals offered: Community-based lifestyle interventions to reduce health risks and promote self-management and wellness, fall prevention, nutrition, physical activity, tobacco-use cessation, weight loss, and mental health services as per orders if indicated.    Discussion today about general wellness and lifestyle habits:    Prevent falls and reduce trip hazards; Cautioned about securing or removing rugs.  Have a working fire alarm and carbon monoxide detector.  Engage in regular physical activity and social activities.    Follow-up: Return for appointment with Primary Care Provider as needed.

## 2024-02-12 NOTE — ASSESSMENT & PLAN NOTE
Chronic, stable. The patient denies any side effects from the current medication. Continue with current defined treatment plan: rosuvastatin (CRESTOR) 20 MG Tab . Follow-up at least annually.

## 2024-02-12 NOTE — ASSESSMENT & PLAN NOTE
Chronic, stable. The patient denies any symptoms at this time. Continue with current defined treatment plan: levothyroxine (SYNTHROID) 50 MCG Tab . Follow-up at least annually.

## 2024-02-14 ENCOUNTER — TELEPHONE (OUTPATIENT)
Dept: HEALTH INFORMATION MANAGEMENT | Facility: OTHER | Age: 74
End: 2024-02-14

## 2024-02-14 ENCOUNTER — APPOINTMENT (OUTPATIENT)
Dept: MEDICAL GROUP | Facility: PHYSICIAN GROUP | Age: 74
End: 2024-02-14
Payer: MEDICARE

## 2024-02-14 ENCOUNTER — APPOINTMENT (RX ONLY)
Dept: URBAN - METROPOLITAN AREA CLINIC 22 | Facility: CLINIC | Age: 74
Setting detail: DERMATOLOGY
End: 2024-02-14

## 2024-02-14 DIAGNOSIS — L82.1 OTHER SEBORRHEIC KERATOSIS: ICD-10-CM

## 2024-02-14 DIAGNOSIS — L24 IRRITANT CONTACT DERMATITIS: ICD-10-CM

## 2024-02-14 DIAGNOSIS — L30.4 ERYTHEMA INTERTRIGO: ICD-10-CM | Status: INADEQUATELY CONTROLLED

## 2024-02-14 PROBLEM — L24.9 IRRITANT CONTACT DERMATITIS, UNSPECIFIED CAUSE: Status: ACTIVE | Noted: 2024-02-14

## 2024-02-14 PROCEDURE — ? COUNSELING

## 2024-02-14 PROCEDURE — ? PRESCRIPTION

## 2024-02-14 PROCEDURE — 99213 OFFICE O/P EST LOW 20 MIN: CPT

## 2024-02-14 RX ORDER — FLUCONAZOLE 150 MG/1
TABLET ORAL
Qty: 4 | Refills: 0 | Status: ERX | COMMUNITY
Start: 2024-02-14

## 2024-02-14 RX ORDER — CALCIUM ACETATE/ALUMINUM SULF
TABLET, EFFERVESCENT TOPICAL BID
Qty: 100 | Refills: 3 | Status: ERX | COMMUNITY
Start: 2024-02-14

## 2024-02-14 RX ORDER — HYDROCORTISONE 25 MG/G
OINTMENT TOPICAL BID
Qty: 28.35 | Refills: 1 | Status: ERX | COMMUNITY
Start: 2024-02-14

## 2024-02-14 RX ADMIN — HYDROCORTISONE 1: 25 OINTMENT TOPICAL at 00:00

## 2024-02-14 RX ADMIN — FLUCONAZOLE: 150 TABLET ORAL at 00:00

## 2024-02-14 RX ADMIN — Medication 1: at 00:00

## 2024-02-14 ASSESSMENT — LOCATION DETAILED DESCRIPTION DERM
LOCATION DETAILED: RIGHT LATERAL ABDOMEN
LOCATION DETAILED: LEFT RIB CAGE
LOCATION DETAILED: LEFT MID-UPPER BACK
LOCATION DETAILED: UPPER STERNUM
LOCATION DETAILED: RIGHT RIB CAGE
LOCATION DETAILED: RIGHT SUPERIOR MEDIAL UPPER BACK

## 2024-02-14 ASSESSMENT — LOCATION SIMPLE DESCRIPTION DERM
LOCATION SIMPLE: LEFT UPPER BACK
LOCATION SIMPLE: CHEST
LOCATION SIMPLE: RIGHT UPPER BACK
LOCATION SIMPLE: ABDOMEN

## 2024-02-14 ASSESSMENT — LOCATION ZONE DERM: LOCATION ZONE: TRUNK

## 2024-02-14 ASSESSMENT — SEVERITY ASSESSMENT: SEVERITY: MODERATE

## 2024-02-14 NOTE — HPI: RASH
What Type Of Note Output Would You Prefer (Optional)?: Standard Output
How Severe Is Your Rash?: moderate
Is This A New Presentation, Or A Follow-Up?: Rash
Additional History: Problem is not improving.

## 2024-02-29 ENCOUNTER — APPOINTMENT (RX ONLY)
Dept: URBAN - METROPOLITAN AREA CLINIC 22 | Facility: CLINIC | Age: 74
Setting detail: DERMATOLOGY
End: 2024-02-29

## 2024-02-29 DIAGNOSIS — L21.8 OTHER SEBORRHEIC DERMATITIS: ICD-10-CM

## 2024-02-29 DIAGNOSIS — L30.4 ERYTHEMA INTERTRIGO: ICD-10-CM | Status: IMPROVED

## 2024-02-29 DIAGNOSIS — L24 IRRITANT CONTACT DERMATITIS: ICD-10-CM | Status: RESOLVED

## 2024-02-29 PROBLEM — L24.9 IRRITANT CONTACT DERMATITIS, UNSPECIFIED CAUSE: Status: ACTIVE | Noted: 2024-02-29

## 2024-02-29 PROBLEM — D48.5 NEOPLASM OF UNCERTAIN BEHAVIOR OF SKIN: Status: ACTIVE | Noted: 2024-02-29

## 2024-02-29 PROCEDURE — ? PRESCRIPTION

## 2024-02-29 PROCEDURE — ? TREATMENT REGIMEN

## 2024-02-29 PROCEDURE — 99213 OFFICE O/P EST LOW 20 MIN: CPT | Mod: 25

## 2024-02-29 PROCEDURE — 11102 TANGNTL BX SKIN SINGLE LES: CPT

## 2024-02-29 PROCEDURE — ? BIOPSY BY SHAVE METHOD

## 2024-02-29 PROCEDURE — ? COUNSELING

## 2024-02-29 RX ORDER — KETOCONAZOLE 20 MG/ML
1 SHAMPOO, SUSPENSION TOPICAL EVERY OTHER DAY
Qty: 120 | Refills: 6 | Status: ERX | COMMUNITY
Start: 2024-02-29

## 2024-02-29 RX ADMIN — KETOCONAZOLE 1: 20 SHAMPOO, SUSPENSION TOPICAL at 00:00

## 2024-02-29 ASSESSMENT — LOCATION ZONE DERM
LOCATION ZONE: TRUNK
LOCATION ZONE: EYELID
LOCATION ZONE: SCALP

## 2024-02-29 ASSESSMENT — LOCATION DETAILED DESCRIPTION DERM
LOCATION DETAILED: RIGHT RIB CAGE
LOCATION DETAILED: POSTERIOR MID-PARIETAL SCALP
LOCATION DETAILED: RIGHT MEDIAL CANTHUS
LOCATION DETAILED: RIGHT LATERAL ABDOMEN
LOCATION DETAILED: UPPER STERNUM
LOCATION DETAILED: LEFT RIB CAGE

## 2024-02-29 ASSESSMENT — LOCATION SIMPLE DESCRIPTION DERM
LOCATION SIMPLE: ABDOMEN
LOCATION SIMPLE: RIGHT EYELID
LOCATION SIMPLE: POSTERIOR SCALP
LOCATION SIMPLE: CHEST

## 2024-02-29 ASSESSMENT — SEVERITY ASSESSMENT 2020: SEVERITY 2020: CLEAR

## 2024-02-29 ASSESSMENT — SEVERITY ASSESSMENT: SEVERITY: ALMOST CLEAR

## 2024-02-29 NOTE — PROCEDURE: TREATMENT REGIMEN
Continue Regimen: Last dose Fluconazole 150mg next week\\n\\nDmurielo soaks as needed
Initiate Treatment: Miconazole powder daily
Detail Level: Simple
Initiate Treatment: Vaseline daily

## 2024-02-29 NOTE — PROCEDURE: BIOPSY BY SHAVE METHOD
Detail Level: Detailed
Depth Of Biopsy: dermis
Was A Bandage Applied: Yes
Size Of Lesion In Cm: 0.6
X Size Of Lesion In Cm: 0
Biopsy Type: H and E
Biopsy Method: Personna blade
Anesthesia Type: 0.5% lidocaine without epinephrine
Anesthesia Volume In Cc: 0.5
Hemostasis: Aluminum Chloride and Electrocautery
Wound Care: Petrolatum
Dressing: pressure dressing with telfa
Destruction After The Procedure: No
Type Of Destruction Used: Curettage
Curettage Text: The wound bed was treated with curettage after the biopsy was performed.
Cryotherapy Text: The wound bed was treated with cryotherapy after the biopsy was performed.
Electrodesiccation Text: The wound bed was treated with electrodesiccation after the biopsy was performed.
Electrodesiccation And Curettage Text: The wound bed was treated with electrodesiccation and curettage after the biopsy was performed.
Silver Nitrate Text: The wound bed was treated with silver nitrate after the biopsy was performed.
Lab: [id number]
Lab Facility: 
Consent: Written consent was obtained and risks were reviewed including but not limited to scarring, infection, bleeding, scabbing, incomplete removal, nerve damage and allergy to anesthesia.
Post-Care Instructions: I reviewed with the patient in detail post-care instructions. Patient is to keep the biopsy site dry overnight. Gentle cleansing daily.  Apply petroleum ointment daily until healed. Patient may apply hydrogen peroxide soaks to remove any crusting.
Notification Instructions: Patient will be notified of biopsy results. However, patient instructed to call the office if not contacted within 2 weeks.
Billing Type: Third-Party Bill
Information: Selecting Yes will display possible errors in your note based on the variables you have selected. This validation is only offered as a suggestion for you. PLEASE NOTE THAT THE VALIDATION TEXT WILL BE REMOVED WHEN YOU FINALIZE YOUR NOTE. IF YOU WANT TO FAX A PRELIMINARY NOTE YOU WILL NEED TO TOGGLE THIS TO 'NO' IF YOU DO NOT WANT IT IN YOUR FAXED NOTE.

## 2024-04-01 ENCOUNTER — HOSPITAL ENCOUNTER (OUTPATIENT)
Dept: LAB | Facility: MEDICAL CENTER | Age: 74
End: 2024-04-01
Attending: FAMILY MEDICINE
Payer: MEDICARE

## 2024-04-01 ENCOUNTER — APPOINTMENT (OUTPATIENT)
Dept: MEDICAL GROUP | Facility: PHYSICIAN GROUP | Age: 74
End: 2024-04-01
Payer: MEDICARE

## 2024-04-01 VITALS
HEIGHT: 64 IN | DIASTOLIC BLOOD PRESSURE: 72 MMHG | OXYGEN SATURATION: 98 % | WEIGHT: 197 LBS | SYSTOLIC BLOOD PRESSURE: 124 MMHG | HEART RATE: 74 BPM | BODY MASS INDEX: 33.63 KG/M2 | RESPIRATION RATE: 18 BRPM | TEMPERATURE: 97.4 F

## 2024-04-01 DIAGNOSIS — N18.31 CHRONIC RENAL FAILURE, STAGE 3A: ICD-10-CM

## 2024-04-01 DIAGNOSIS — E03.8 OTHER SPECIFIED HYPOTHYROIDISM: ICD-10-CM

## 2024-04-01 DIAGNOSIS — Z00.00 ADULT GENERAL MEDICAL EXAM: ICD-10-CM

## 2024-04-01 DIAGNOSIS — Z78.0 POSTMENOPAUSAL ESTROGEN DEFICIENCY: ICD-10-CM

## 2024-04-01 DIAGNOSIS — E55.9 VITAMIN D DEFICIENCY: ICD-10-CM

## 2024-04-01 DIAGNOSIS — E78.2 MIXED HYPERLIPIDEMIA: ICD-10-CM

## 2024-04-01 DIAGNOSIS — M19.90 ARTHRITIS: ICD-10-CM

## 2024-04-01 DIAGNOSIS — M25.562 ACUTE PAIN OF LEFT KNEE: ICD-10-CM

## 2024-04-01 DIAGNOSIS — C44.310 BASAL CELL CARCINOMA (BCC) OF FACE: ICD-10-CM

## 2024-04-01 PROBLEM — M79.662 PAIN AND SWELLING OF LEFT LOWER LEG: Status: RESOLVED | Noted: 2023-06-27 | Resolved: 2024-04-01

## 2024-04-01 PROBLEM — M79.89 PAIN AND SWELLING OF LEFT LOWER LEG: Status: RESOLVED | Noted: 2023-06-27 | Resolved: 2024-04-01

## 2024-04-01 PROBLEM — L29.9 EAR ITCHING: Status: RESOLVED | Noted: 2023-10-11 | Resolved: 2024-04-01

## 2024-04-01 PROBLEM — Z13.820 ENCOUNTER FOR OSTEOPOROSIS SCREENING IN ASYMPTOMATIC POSTMENOPAUSAL PATIENT: Status: RESOLVED | Noted: 2023-05-09 | Resolved: 2024-04-01

## 2024-04-01 PROBLEM — F11.20 OPIOID DEPENDENCE (HCC): Status: RESOLVED | Noted: 2024-02-12 | Resolved: 2024-04-01

## 2024-04-01 LAB
25(OH)D3 SERPL-MCNC: 65 NG/ML (ref 30–100)
ALBUMIN SERPL BCP-MCNC: 4.1 G/DL (ref 3.2–4.9)
ALBUMIN/GLOB SERPL: 1.7 G/DL
ALP SERPL-CCNC: 80 U/L (ref 30–99)
ALT SERPL-CCNC: 24 U/L (ref 2–50)
ANION GAP SERPL CALC-SCNC: 10 MMOL/L (ref 7–16)
APPEARANCE UR: CLEAR
AST SERPL-CCNC: 26 U/L (ref 12–45)
BASOPHILS # BLD AUTO: 0.7 % (ref 0–1.8)
BASOPHILS # BLD: 0.04 K/UL (ref 0–0.12)
BILIRUB SERPL-MCNC: 0.7 MG/DL (ref 0.1–1.5)
BILIRUB UR QL STRIP.AUTO: NEGATIVE
BUN SERPL-MCNC: 20 MG/DL (ref 8–22)
CALCIUM ALBUM COR SERPL-MCNC: 9.2 MG/DL (ref 8.5–10.5)
CALCIUM SERPL-MCNC: 9.3 MG/DL (ref 8.5–10.5)
CHLORIDE SERPL-SCNC: 106 MMOL/L (ref 96–112)
CHOLEST SERPL-MCNC: 136 MG/DL (ref 100–199)
CO2 SERPL-SCNC: 25 MMOL/L (ref 20–33)
COLOR UR: YELLOW
CREAT SERPL-MCNC: 1.12 MG/DL (ref 0.5–1.4)
EOSINOPHIL # BLD AUTO: 0.21 K/UL (ref 0–0.51)
EOSINOPHIL NFR BLD: 3.6 % (ref 0–6.9)
ERYTHROCYTE [DISTWIDTH] IN BLOOD BY AUTOMATED COUNT: 45.4 FL (ref 35.9–50)
GFR SERPLBLD CREATININE-BSD FMLA CKD-EPI: 52 ML/MIN/1.73 M 2
GLOBULIN SER CALC-MCNC: 2.4 G/DL (ref 1.9–3.5)
GLUCOSE SERPL-MCNC: 106 MG/DL (ref 65–99)
GLUCOSE UR STRIP.AUTO-MCNC: NEGATIVE MG/DL
HCT VFR BLD AUTO: 48.7 % (ref 37–47)
HDLC SERPL-MCNC: 59 MG/DL
HGB BLD-MCNC: 15.5 G/DL (ref 12–16)
IMM GRANULOCYTES # BLD AUTO: 0.02 K/UL (ref 0–0.11)
IMM GRANULOCYTES NFR BLD AUTO: 0.3 % (ref 0–0.9)
KETONES UR STRIP.AUTO-MCNC: NEGATIVE MG/DL
LDLC SERPL CALC-MCNC: 56 MG/DL
LEUKOCYTE ESTERASE UR QL STRIP.AUTO: NEGATIVE
LYMPHOCYTES # BLD AUTO: 1.18 K/UL (ref 1–4.8)
LYMPHOCYTES NFR BLD: 20.5 % (ref 22–41)
MCH RBC QN AUTO: 28.3 PG (ref 27–33)
MCHC RBC AUTO-ENTMCNC: 31.8 G/DL (ref 32.2–35.5)
MCV RBC AUTO: 88.9 FL (ref 81.4–97.8)
MICRO URNS: NORMAL
MONOCYTES # BLD AUTO: 0.55 K/UL (ref 0–0.85)
MONOCYTES NFR BLD AUTO: 9.5 % (ref 0–13.4)
NEUTROPHILS # BLD AUTO: 3.77 K/UL (ref 1.82–7.42)
NEUTROPHILS NFR BLD: 65.4 % (ref 44–72)
NITRITE UR QL STRIP.AUTO: NEGATIVE
NRBC # BLD AUTO: 0 K/UL
NRBC BLD-RTO: 0 /100 WBC (ref 0–0.2)
PH UR STRIP.AUTO: 6.5 [PH] (ref 5–8)
PLATELET # BLD AUTO: 200 K/UL (ref 164–446)
PMV BLD AUTO: 9.9 FL (ref 9–12.9)
POTASSIUM SERPL-SCNC: 5.3 MMOL/L (ref 3.6–5.5)
PROT SERPL-MCNC: 6.5 G/DL (ref 6–8.2)
PROT UR QL STRIP: NEGATIVE MG/DL
RBC # BLD AUTO: 5.48 M/UL (ref 4.2–5.4)
RBC UR QL AUTO: NEGATIVE
SODIUM SERPL-SCNC: 141 MMOL/L (ref 135–145)
SP GR UR STRIP.AUTO: 1.02
TRIGL SERPL-MCNC: 107 MG/DL (ref 0–149)
TSH SERPL DL<=0.005 MIU/L-ACNC: 3.79 UIU/ML (ref 0.38–5.33)
UROBILINOGEN UR STRIP.AUTO-MCNC: 0.2 MG/DL
WBC # BLD AUTO: 5.8 K/UL (ref 4.8–10.8)

## 2024-04-01 PROCEDURE — 36415 COLL VENOUS BLD VENIPUNCTURE: CPT

## 2024-04-01 PROCEDURE — 3074F SYST BP LT 130 MM HG: CPT | Performed by: FAMILY MEDICINE

## 2024-04-01 PROCEDURE — 84443 ASSAY THYROID STIM HORMONE: CPT

## 2024-04-01 PROCEDURE — 85025 COMPLETE CBC W/AUTO DIFF WBC: CPT

## 2024-04-01 PROCEDURE — 99214 OFFICE O/P EST MOD 30 MIN: CPT | Performed by: FAMILY MEDICINE

## 2024-04-01 PROCEDURE — 80053 COMPREHEN METABOLIC PANEL: CPT

## 2024-04-01 PROCEDURE — 82306 VITAMIN D 25 HYDROXY: CPT

## 2024-04-01 PROCEDURE — 81003 URINALYSIS AUTO W/O SCOPE: CPT

## 2024-04-01 PROCEDURE — 80061 LIPID PANEL: CPT

## 2024-04-01 PROCEDURE — 3078F DIAST BP <80 MM HG: CPT | Performed by: FAMILY MEDICINE

## 2024-04-01 RX ORDER — DIAZEPAM 5 MG/1
TABLET ORAL
Qty: 4 TABLET | Refills: 0 | Status: SHIPPED | OUTPATIENT
Start: 2024-04-01 | End: 2024-04-08

## 2024-04-01 RX ORDER — CELECOXIB 200 MG/1
200 CAPSULE ORAL 2 TIMES DAILY
Qty: 60 CAPSULE | Refills: 1 | Status: SHIPPED | OUTPATIENT
Start: 2024-04-01

## 2024-04-01 RX ORDER — FLUCONAZOLE 200 MG/1
200 TABLET ORAL DAILY
COMMUNITY

## 2024-04-01 ASSESSMENT — FIBROSIS 4 INDEX: FIB4 SCORE: 2.08

## 2024-04-01 NOTE — ASSESSMENT & PLAN NOTE
This is a new issue.  She has been having pain and swelling behind her left knee.  She is concerned that she may have a popliteal cyst.  She is not interested at all in any kind of knee surgery as she states it took her a year to get better from her right knee replacement.

## 2024-04-01 NOTE — ASSESSMENT & PLAN NOTE
This is a chronic problem.  She states that the paroxetine does not seem to be working as well for her arthritis and she is particularly having troubles in the right thumb.  She would like to try something different.  In the past she states she did really give Celebrex at long end of trial and she would like to do that.  We do need to monitor her kidney functions and we will order those as well.  She has tried diclofenac gel but that did not help.

## 2024-04-01 NOTE — PROGRESS NOTES
Subjective:     CC: Here for her exam and several issues.    HPI:   Deneen presents today with the following medical concerns:    Adult general medical exam  Here for her physical exam and to go over several issues.  She has done some intermittent fasting since last seen and has successfully lost some weight.  She is due for labs and those be ordered today as well.    Arthritis  This is a chronic problem.  She states that the paroxetine does not seem to be working as well for her arthritis and she is particularly having troubles in the right thumb.  She would like to try something different.  In the past she states she did really give Celebrex at long end of trial and she would like to do that.  We do need to monitor her kidney functions and we will order those as well.  She has tried diclofenac gel but that did not help.      Basal cell carcinoma (BCC) of face  This is a new issue.  She did see the dermatologist and a biopsy showed basal cell carcinoma to her left upper lip area.  She is scheduled to have Mohs surgery next.  She is asking for some Valium to take as she states she gets very emotional during surgical procedures.    BMI 33.0-33.9,adult  This is a chronic problem.  Patient is losing weight on her diet plan.    Chronic renal failure, stage 3a (HCC)  This is a chronic problem.  Lab ordered for follow-up.  She knows to avoid excessive amounts of NSAIDs and if we have any issues with that we will have to discontinue it anyway.  Tylenol does not work for her.    Left knee pain  This is a new issue.  She has been having pain and swelling behind her left knee.  She is concerned that she may have a popliteal cyst.  She is not interested at all in any kind of knee surgery as she states it took her a year to get better from her right knee replacement.    Mixed hyperlipidemia  This is a chronic problem.  Lab ordered for follow-up.    Other specified hypothyroidism  This is a chronic problem.  She is on  supplementation.  Lab ordered for follow-up.    Vitamin D deficiency  This is a chronic problem.  Lab ordered for follow-up.    Postmenopausal estrogen deficiency  This is a chronic problem.  She does have osteopenia.  DEXA scan ordered for follow-up.    Past Medical History:   Diagnosis Date    Arthritis     osteo - fingers, knees, back    Disorder of thyroid     hypoactive    Family history of diabetes mellitus 02/17/2015    High cholesterol     Hyperlipidemia     Morbid obesity (HCC) 06/15/2022    Pain 04/22/2016    low back-chronic    Pain 01/27/2017    right knee    Personal history of colonic polyps 02/12/2020    Goes to [employer].    Screening mammogram for high-risk patient 10/07/2019    Status post right knee replacement 01/30/2017       Social History     Tobacco Use    Smoking status: Never     Passive exposure: Never    Smokeless tobacco: Never   Vaping Use    Vaping Use: Never used   Substance Use Topics    Alcohol use: No    Drug use: No       Current Outpatient Medications Ordered in Epic   Medication Sig Dispense Refill    fluconazole (DIFLUCAN) 200 MG Tab Take 200 mg by mouth every day. 4 weeks once a weel unsure the strength      celecoxib (CELEBREX) 200 MG Cap Take 1 Capsule by mouth 2 times a day. 60 Capsule 1    diazePAM (VALIUM) 5 MG Tab Take 1-2 before procedure and may repeat in 6 hours if needed 4 Tablet 0    econazole nitrate 1 % cream Apply 1 Application topically 2 times a day. 30 g 0    rosuvastatin (CRESTOR) 20 MG Tab Take 1 Tablet by mouth every evening. 100 Tablet 3    levothyroxine (SYNTHROID) 50 MCG Tab Take 1 Tablet by mouth every morning on an empty stomach. 90 Tablet 3    MAGNESIUM PO magnesium      POTASSIUM PO potassium      ketoconazole (NIZORAL) 2 % Cream Apply 1 Squirt topically 2 times a day. Use on right external ear canal 15 g 3    HYDROcodone/acetaminophen (NORCO)  MG Tab Take 1 Tablet by mouth every 6 hours as needed.      methocarbamol (ROBAXIN) 500 MG Tab  "Take 1 Tablet by mouth 4 times a day as needed (back spasm). 90 Tablet 3    nystatin (MYCOSTATIN) powder APPLY TO AFFECTED AREA 3 TIMES A DAY FOR 7 DAYS 60 g 6    triamcinolone (KENALOG) 0.1 % lotion Apply to scalp after shampooing with Selsun Blue shampoo 60 mL 5    aspirin 81 MG EC tablet Take 1 Tab by mouth 2 times a day, with meals. 90 Tab 0    vitamin D3, cholecalciferol, 1000 UNIT Tab Take 1 Tab by mouth every day. 100 Tab 3    ascorbic acid (ASCORBIC ACID) 500 MG Tab Take 1,000 mg by mouth every day.      BETA CAROTENE PO Take 10,000 Int'l Units by mouth every day.      B Complex-C-Folic Acid (STRESS B COMPLEX PO) Take  by mouth 2 Times a Day.      Calcium 1500 MG TABS Take  by mouth every day.       No current [facility]-ordered facility-administered medications on file.       Allergies:  Meloxicam and Silicone    Health Maintenance: Completed    ROS:  Gen: no fevers/chills, no changes in weight  Eyes: no changes in vision  ENT: no sore throat, no hearing loss, no bloody nose  Pulm: no sob, no cough  CV: no chest pain, no palpitations  GI: no nausea/vomiting, no diarrhea  : no dysuria  MSk: no myalgias  Skin: no rash  Neuro: no headaches, no numbness/tingling  Heme/Lymph: no easy bruising      Objective:       Exam:  /72 (BP Location: Left arm, Patient Position: Sitting, BP Cuff Size: Adult)   Pulse 74   Temp 36.3 °C (97.4 °F) (Temporal)   Resp 18   Ht 1.626 m (5' 4\")   Wt 89.4 kg (197 lb)   SpO2 98%   BMI 33.81 kg/m²  Body mass index is 33.81 kg/m².    Gen: Alert and oriented, No apparent distress.  Ears:    Ear canals and TMs are clear.  Neck: Neck is supple without lymphadenopathy.  Lungs: Normal effort, CTA bilaterally, no wheezes, rhonchi, or rales  CV: Regular rate and rhythm. No murmurs, rubs, or gallops.  Abdomen: Benign  Ext: No clubbing, cyanosis, edema.  Patient has discomfort at the base of her right thumb without any redness or swelling.  She states is uncomfortable to movement as well. "  Examination of the left knee reveals some fullness to the popliteal area which could represent a Baker's cyst.  There is some mild crepitus on movement to no redness or swelling to the joint itself.  Neuro: Cranial nerves II through VIII are grossly intact.  No lateralized signs are seen.  Gait is normal.  Skin:    There is a slight red area where the basal cell carcinoma was biopsied onto her left upper lip area.    Labs: Ordered    Assessment & Plan:     74 y.o. female with the following -     1. Vitamin D deficiency  This is a chronic problem.  Lab ordered for follow-up.  Continue on supplementation.  - VITAMIN D,25 HYDROXY (DEFICIENCY); Future    2. Other specified hypothyroidism  This is a chronic problem.  Lab ordered.  - TSH WITH REFLEX TO FT4; Future    3. Chronic renal failure, stage 3a  This is a chronic problem.  Lab ordered for follow-up.  Will try her on Celebrex but told her we will have to monitor her kidney functions regularly.  - Comp Metabolic Panel; Future  - ESTIMATED GFR; Future    4. Mixed hyperlipidemia  This is a chronic problem.  Lab ordered.  Continue on healthy diet.  - Comp Metabolic Panel; Future  - Lipid Profile; Future    5. Adult general medical exam  Patient's medical exam today was very good.  We discussed general healthcare issues and get baseline labs.  - Comp Metabolic Panel; Future  - Lipid Profile; Future  - URINALYSIS,CULTURE IF INDICATED; Future  - CBC WITH DIFFERENTIAL; Future  - ESTIMATED GFR; Future    6. Postmenopausal estrogen deficiency  This is a screening issue.  DEXA scan ordered.  - DS-BONE DENSITY STUDY (DEXA); Future    7. Acute pain of left knee  This is a new issue.  I told her she most likely does have a Baker's cyst and likely due to degenerative disease of the knee.  I gave her options and she would like to go to the [facility] [place] [facility] to see if they can do an injection.    8. Basal cell carcinoma (BCC) of face  This is a new issue.  Continue follow-up  with her surgeon and dermatologist.  I did give her for Valium's to use as needed for anxiety for the procedure.  She is to notify them at the time of the procedure so they can tell her when to take them.  - diazePAM (VALIUM) 5 MG Tab; Take 1-2 before procedure and may repeat in 6 hours if needed  Dispense: 4 Tablet; Refill: 0    9. Arthritis  This is a chronic problem.  Will try her on Celebrex and see how that does.    10. BMI 33.0-33.9,adult  This is a chronic problem.  Continue to work on weight reduction.  - Patient identified as having weight management issue.  Appropriate orders and counseling given.      Return in about 6 months (around 10/1/2024) for Long.    Please note that this dictation was created using voice recognition software. I have made every reasonable attempt to correct obvious errors, but I expect that there are errors of grammar and possibly content that I did not discover before finalizing the note.

## 2024-04-01 NOTE — ASSESSMENT & PLAN NOTE
Here for her physical exam and to go over several issues.  She has done some intermittent fasting since last seen and has successfully lost some weight.  She is due for labs and those be ordered today as well.

## 2024-04-01 NOTE — ASSESSMENT & PLAN NOTE
This is a chronic problem.  Lab ordered for follow-up.  She knows to avoid excessive amounts of NSAIDs and if we have any issues with that we will have to discontinue it anyway.  Tylenol does not work for her.

## 2024-04-01 NOTE — ASSESSMENT & PLAN NOTE
This is a new issue.  She did see the dermatologist and a biopsy showed basal cell carcinoma to her left upper lip area.  She is scheduled to have Mohs surgery next.  She is asking for some Valium to take as she states she gets very emotional during surgical procedures.

## 2024-04-02 DIAGNOSIS — N18.31 CHRONIC RENAL FAILURE, STAGE 3A: ICD-10-CM

## 2024-04-08 ENCOUNTER — APPOINTMENT (OUTPATIENT)
Dept: MEDICAL GROUP | Facility: PHYSICIAN GROUP | Age: 74
End: 2024-04-08
Payer: MEDICARE

## 2024-04-26 ENCOUNTER — HOSPITAL ENCOUNTER (OUTPATIENT)
Dept: RADIOLOGY | Facility: MEDICAL CENTER | Age: 74
End: 2024-04-26
Attending: FAMILY MEDICINE
Payer: MEDICARE

## 2024-04-26 DIAGNOSIS — Z78.0 POSTMENOPAUSAL ESTROGEN DEFICIENCY: ICD-10-CM

## 2024-04-26 PROCEDURE — 77080 DXA BONE DENSITY AXIAL: CPT

## 2024-05-01 ENCOUNTER — APPOINTMENT (RX ONLY)
Dept: URBAN - METROPOLITAN AREA CLINIC 22 | Facility: CLINIC | Age: 74
Setting detail: DERMATOLOGY
End: 2024-05-01

## 2024-05-01 PROBLEM — C44.01 BASAL CELL CARCINOMA OF SKIN OF LIP: Status: ACTIVE | Noted: 2024-05-01

## 2024-05-01 PROCEDURE — ? PRESCRIPTION

## 2024-05-01 PROCEDURE — 17311 MOHS 1 STAGE H/N/HF/G: CPT

## 2024-05-01 PROCEDURE — 14060 TIS TRNFR E/N/E/L 10 SQ CM/<: CPT

## 2024-05-01 PROCEDURE — ? MOHS SURGERY

## 2024-05-01 RX ORDER — HYDROCODONE BITARTRATE AND ACETAMINOPHEN 5; 325 MG/1; MG/1
TABLET ORAL
Qty: 15 | Refills: 0 | Status: ERX | COMMUNITY
Start: 2024-05-01

## 2024-05-01 RX ADMIN — HYDROCODONE BITARTRATE AND ACETAMINOPHEN: 5; 325 TABLET ORAL at 00:00

## 2024-05-01 NOTE — PROCEDURE: MOHS SURGERY
Mohs Case Number: [id number]
Previous Accession (Optional): [id number]
Biopsy Photograph Reviewed: Yes
Referring Physician (Optional): Mannering
Consent Type: Consent 1 (Standard)
Eye Shield Used: No
Surgeon Performing Repair (Optional): Trip Clay M.D.
Initial Size Of Lesion: 0
X Size Of Lesion In Cm (Optional): 0.7
Number Of Stages: 1
Primary Defect Length In Cm (Final Defect Size - Required For Flaps/Grafts): 1.4
Primary Defect Width In Cm (Final Defect Size - Required For Flaps/Grafts): 0.9
Repair Type: Flap
Which Instrument Did You Use For Dermabrasion?: Wire Brush
Which Eyelid Repair Cpt Are You Using?: 02475
Oculoplastic Surgeon Procedure Text (A): After obtaining clear surgical margins the patient was sent to oculoplastics for surgical repair.  The patient understands they will receive post-surgical care and follow-up from the referring physician's office.
Otolaryngologist Procedure Text (A): After obtaining clear surgical margins the patient was sent to otolaryngology for surgical repair.  The patient understands they will receive post-surgical care and follow-up from the referring physician's office.
Plastic Surgeon Procedure Text (A): After obtaining clear surgical margins the patient was sent to plastics for surgical repair.  The patient understands they will receive post-surgical care and follow-up from the referring physician's office.
Mid-Level Procedure Text (A): After obtaining clear surgical margins the patient was sent to a mid-level provider for surgical repair.  The patient understands they will receive post-surgical care and follow-up from the mid-level provider.
Provider Procedure Text (A): After obtaining clear surgical margins the defect was repaired by another provider.
Asc Procedure Text (A): After obtaining clear surgical margins the patient was sent to an ASC for surgical repair.  The patient understands they will receive post-surgical care and follow-up from the ASC physician.
Suturegard Retention Suture: 2-0 Nylon
Retention Suture Bite Size: 3 mm
Length To Time In Minutes Device Was In Place: 10
Undermining Type: Entire Wound
Debridement Text: The wound edges were debrided prior to proceeding with the closure to facilitate wound healing.
Helical Rim Text: The closure involved the helical rim.
Vermilion Border Text: The closure involved the vermilion border.
Nostril Rim Text: The closure involved the nostril rim.
Retention Suture Text: Retention sutures were placed to support the closure and prevent dehiscence.
Flap Type: A-T Advancement Flap
Secondary Defect Length In Cm (Required For Flaps): 2.6
Secondary Defect Width In Cm (Required For Flaps): 0.4
Location Indication Override (Is Already Calculated Based On Selected Body Location): Area H
Area H Indication Text: Tumors in this location are included in Area H (eyelids, eyebrows, nose, lips, chin, ear, pre-auricular, post-auricular, temple, genitalia, hands, feet, ankles and areola).  Tissue conservation is critical in these anatomic locations.
Area M Indication Text: Tumors in this location are included in Area M (cheek, forehead, scalp, neck, jawline and pretibial skin).  Mohs surgery is indicated for tumors in these anatomic locations.
Area L Indication Text: Tumors in this location are included in Area L (trunk and extremities).  Mohs surgery is indicated for larger tumors, or tumors with aggressive histologic features, in these anatomic locations.
Tumor Debulked?: curette
Depth Of Tumor Invasion (For Histology): tumor not visualized (deep and peripheral margins are clear of tumor)
Perineural Invasion (For Histology - Be Specific If Possible): absent
Presence Of Scar Tissue (For Histology): present
Special Stains Stage 1 - Results: Base On Clearance Noted Above
Stage 2: Additional Anesthesia Type: 1% lidocaine with epinephrine and a 1:10 solution of 8.4% sodium bicarbonate
Stage 4: Additional Anesthesia Type: 1% lidocaine with epinephrine
Include Tumor Staging In Mohs Note?: Please Select the Appropriate Response
Staging Info: By selecting yes to the question above you will include information on AJCC 8 tumor staging in your Mohs note. Information on tumor staging will be automatically added for SCCs on the head and neck. AJCC 8 includes tumor size, tumor depth, perineural involvement and bone invasion.
Tumor Depth: Less than 6mm from granular layer and no invasion beyond the subcutaneous fat
Medical Necessity Statement: Based on my medical judgement, Mohs surgery is the most appropriate treatment for this cancer compared to other treatments.
Alternatives Discussed Intro (Do Not Add Period): I discussed alternative treatments to Mohs surgery and specifically discussed the risks and benefits of
Consent 1/Introductory Paragraph: The rationale for Mohs was explained to the patient and consent was obtained. The risks, benefits and alternatives to therapy were discussed in detail. Specifically, the risks of infection, scarring, bleeding, prolonged wound healing, incomplete removal, allergy to anesthesia, nerve injury and recurrence were addressed. Prior to the procedure, the treatment site was clearly identified and confirmed by the patient. All components of Universal Protocol/PAUSE Rule completed.
Consent 2/Introductory Paragraph: Mohs surgery was explained to the patient and consent was obtained. The risks, benefits and alternatives to therapy were discussed in detail. Specifically, the risks of infection, scarring, bleeding, prolonged wound healing, incomplete removal, allergy to anesthesia, nerve injury and recurrence were addressed. Prior to the procedure, the treatment site was clearly identified and confirmed by the patient. All components of Universal Protocol/PAUSE Rule completed.
Consent 3/Introductory Paragraph: I gave the patient a chance to ask questions they had about the procedure.  Following this I explained the Mohs procedure and consent was obtained. The risks, benefits and alternatives to therapy were discussed in detail. Specifically, the risks of infection, scarring, bleeding, prolonged wound healing, incomplete removal, allergy to anesthesia, nerve injury and recurrence were addressed. Prior to the procedure, the treatment site was clearly identified and confirmed by the patient. All components of Universal Protocol/PAUSE Rule completed.
Consent (Temporal Branch)/Introductory Paragraph: The rationale for Mohs was explained to the patient and consent was obtained. The risks, benefits and alternatives to therapy were discussed in detail. Specifically, the risks of damage to the temporal branch of the facial nerve, infection, scarring, bleeding, prolonged wound healing, incomplete removal, allergy to anesthesia, and recurrence were addressed. Prior to the procedure, the treatment site was clearly identified and confirmed by the patient. All components of Universal Protocol/PAUSE Rule completed.
Consent (Marginal Mandibular)/Introductory Paragraph: The rationale for Mohs was explained to the patient and consent was obtained. The risks, benefits and alternatives to therapy were discussed in detail. Specifically, the risks of damage to the marginal mandibular branch of the facial nerve, infection, scarring, bleeding, prolonged wound healing, incomplete removal, allergy to anesthesia, and recurrence were addressed. Prior to the procedure, the treatment site was clearly identified and confirmed by the patient. All components of Universal Protocol/PAUSE Rule completed.
Consent (Spinal Accessory)/Introductory Paragraph: The rationale for Mohs was explained to the patient and consent was obtained. The risks, benefits and alternatives to therapy were discussed in detail. Specifically, the risks of damage to the spinal accessory nerve, infection, scarring, bleeding, prolonged wound healing, incomplete removal, allergy to anesthesia, and recurrence were addressed. Prior to the procedure, the treatment site was clearly identified and confirmed by the patient. All components of Universal Protocol/PAUSE Rule completed.
Consent (Near Eyelid Margin)/Introductory Paragraph: The rationale for Mohs was explained to the patient and consent was obtained. The risks, benefits and alternatives to therapy were discussed in detail. Specifically, the risks of ectropion or eyelid deformity, infection, scarring, bleeding, prolonged wound healing, incomplete removal, allergy to anesthesia, nerve injury and recurrence were addressed. Prior to the procedure, the treatment site was clearly identified and confirmed by the patient. All components of Universal Protocol/PAUSE Rule completed.
Consent (Ear)/Introductory Paragraph: The rationale for Mohs was explained to the patient and consent was obtained. The risks, benefits and alternatives to therapy were discussed in detail. Specifically, the risks of ear deformity, infection, scarring, bleeding, prolonged wound healing, incomplete removal, allergy to anesthesia, nerve injury and recurrence were addressed. Prior to the procedure, the treatment site was clearly identified and confirmed by the patient. All components of Universal Protocol/PAUSE Rule completed.
Consent (Nose)/Introductory Paragraph: The rationale for Mohs was explained to the patient and consent was obtained. The risks, benefits and alternatives to therapy were discussed in detail. Specifically, the risks of nasal deformity, changes in the flow of air through the nose, infection, scarring, bleeding, prolonged wound healing, incomplete removal, allergy to anesthesia, nerve injury and recurrence were addressed. Prior to the procedure, the treatment site was clearly identified and confirmed by the patient. All components of Universal Protocol/PAUSE Rule completed.
Consent (Lip)/Introductory Paragraph: The rationale for Mohs was explained to the patient and consent was obtained. The risks, benefits and alternatives to therapy were discussed in detail. Specifically, the risks of lip deformity, changes in the oral aperture, infection, scarring, bleeding, prolonged wound healing, incomplete removal, allergy to anesthesia, nerve injury and recurrence were addressed. Prior to the procedure, the treatment site was clearly identified and confirmed by the patient. All components of Universal Protocol/PAUSE Rule completed.
Consent (Scalp)/Introductory Paragraph: The rationale for Mohs was explained to the patient and consent was obtained. The risks, benefits and alternatives to therapy were discussed in detail. Specifically, the risks of changes in hair growth pattern secondary to repair, infection, scarring, bleeding, prolonged wound healing, incomplete removal, allergy to anesthesia, nerve injury and recurrence were addressed. Prior to the procedure, the treatment site was clearly identified and confirmed by the patient. All components of Universal Protocol/PAUSE Rule completed.
Detail Level: Detailed
Postop Diagnosis: same
Anesthesia Volume In Cc: 7.1
Additional Anesthesia Volume In Cc: 6
Hemostasis: Electrodesiccation
Estimated Blood Loss (Cc): minimal
Repair Anesthesia Method: local infiltration
Anesthesia Volume In Cc: 3
Undermining Location (Optional): in the deep fat
Brow Lift Text: A midfrontal incision was made medially to the defect to allow access to the tissues just superior to the left eyebrow. Following careful dissection inferiorly in a supraperiosteal plane to the level of the left eyebrow, several 3-0 monocryl sutures were used to resuspend the eyebrow orbicularis oculi muscular unit to the superior frontal bone periosteum. This resulted in an appropriate reapproximation of static eyebrow symmetry and correction of the left brow ptosis.
Deep Sutures: 4-0 Maxon
Epidermal Sutures: 6-0 Prolene
Epidermal Closure: running and interrupted
Suturegard Intro: Intraoperative tissue expansion was performed, utilizing the SUTUREGARD device, in order to reduce wound tension.
Suturegard Body: The suture ends were repeatedly re-tightened and re-clamped to achieve the desired tissue expansion.
Hemigard Intro: Due to skin fragility and wound tension, it was decided to use HEMIGARD adhesive retention suture devices to permit a linear closure. The skin was cleaned and dried for a 6cm distance away from the wound. Excessive hair, if present, was removed to allow for adhesion.
Hemigard Postcare Instructions: The HEMIGARD strips are to remain completely dry for at least 5-7 days.
Donor Site Anesthesia Type: same as repair anesthesia
Epidermal Closure Graft Donor Site (Optional): simple interrupted
Graft Donor Site Bandage (Optional-Leave Blank If You Don't Want In Note): Steri-strips and a pressure bandage were applied to the donor site.
Closure 2 Information: This tab is for additional flaps and grafts, including complex repair and grafts and complex repair and flaps. You can also specify a different location for the additional defect, if the location is the same you do not need to select a new one. We will insert the automated text for the repair you select below just as we do for solitary flaps and grafts. Please note that at this time if you select a location with a different insurance zone you will need to override the ICD10 and CPT if appropriate.
Closure 3 Information: This tab is for additional flaps and grafts above and beyond our usual structured repairs.  Please note if you enter information here it will not currently bill and you will need to add the billing information manually.
Wound Care: Petrolatum
Dressing: pressure dressing with telfa
Dressing (No Sutures): dry sterile dressing
Suture Removal: 7 days
Unna Boot Text: An Unna boot was placed to help immobilize the limb and facilitate more rapid healing.
Home Suture Removal Text: Patient was provided instructions on removing sutures and will remove their sutures at home.  If they have any questions or difficulties they will call the office.
Post-Care Instructions: I reviewed with the patient in detail post-care instructions. Patient is not to engage in any heavy lifting, exercise, or swimming for the next 14 days. Should the patient develop any fevers, chills, bleeding, severe pain patient will contact the office immediately.
Pain Refusal Text: I offered to prescribe pain medication but the patient refused to take this medication.
Mauc Instructions: By selecting yes to the question below the MAUC number will be added into the note.  This will be calculated automatically based on the diagnosis chosen, the size entered, the body zone selected (H,M,L) and the specific indications you chose. You will also have the option to override the Mohs AUC if you disagree with the automatically calculated number and this option is found in the Case Summary tab.
Where Do You Want The Question To Include Opioid Counseling Located?: Case Summary Tab
Eye Protection Verbiage: Before proceeding with the stage, a plastic scleral shield was inserted. The globe was anesthetized with a few drops of 1% lidocaine with 1:100,000 epinephrine. Then, an appropriate sized scleral shield was chosen and coated with lacrilube ointment. The shield was gently inserted and left in place for the duration of each stage. After the stage was completed, the shield was gently removed.
Mohs Method Verbiage: An incision at a 45 degree angle following the standard Mohs approach was done and the specimen was harvested as a microscopic controlled layer.
Surgeon/Pathologist Verbiage (Will Incorporate Name Of Surgeon From Intro If Not Blank): operated in two distinct and integrated capacities as the surgeon and pathologist.
Mohs Histo Method Verbiage: Each section was then chromacoded and processed in the Mohs lab using the Mohs protocol and submitted for frozen section.
Subsequent Stages Histo Method Verbiage: Using a similar technique to that described above, a thin layer of tissue was removed from all areas where tumor was visible on the previous stage.  The tissue was again oriented, mapped, dyed, and processed as above.
Mohs Rapid Report Verbiage: The area of clinically evident tumor was marked with skin marking ink and appropriately hatched.  The initial incision was made following the Mohs approach through the skin.  The specimen was taken to the lab, divided into the necessary number of pieces, chromacoded and processed according to the Mohs protocol.  This was repeated in successive stages until a tumor free defect was achieved.
Complex Repair Preamble Text (Leave Blank If You Do Not Want): Extensive wide undermining was performed.
Intermediate Repair Preamble Text (Leave Blank If You Do Not Want): Undermining was performed with blunt dissection.
Non-Graft Cartilage Fenestration Text: The cartilage was fenestrated with a 2mm punch biopsy to help facilitate healing.
Graft Cartilage Fenestration Text: The cartilage was fenestrated with a 2mm punch biopsy to help facilitate graft survival and healing.
Secondary Intention Text (Leave Blank If You Do Not Want): The defect will heal with secondary intention.
No Repair - Repaired With Adjacent Surgical Defect Text (Leave Blank If You Do Not Want): After obtaining clear surgical margins the defect was repaired concurrently with another surgical defect which was in close approximation.
Adjacent Tissue Transfer Text: The defect edges were debeveled with a #15 scalpel blade. Given the location of the defect and the proximity to free margins an adjacent tissue transfer was deemed most appropriate. Using a sterile surgical marker, an appropriate flap was drawn incorporating the defect and placing the expected incisions within the relaxed skin tension lines where possible. The area thus outlined was incised deep to adipose tissue with a #15 scalpel blade. The skin margins were undermined to an appropriate distance in all directions utilizing iris scissors and carried over to close the primary defect.
Advancement Flap (Single) Text: The defect edges were debeveled with a #15 scalpel blade.  Given the location of the defect and the proximity to free margins a single advancement flap was deemed most appropriate.  Using a sterile surgical marker, an appropriate advancement flap was drawn incorporating the defect and placing the expected incisions within the relaxed skin tension lines where possible.    The area thus outlined was incised deep to adipose tissue with a #15 scalpel blade.  The skin margins were undermined to an appropriate distance in all directions utilizing iris scissors.
Advancement Flap (Double) Text: The defect edges were debeveled with a #15 scalpel blade.  Given the location of the defect and the proximity to free margins a double advancement flap was deemed most appropriate.  Using a sterile surgical marker, the appropriate advancement flaps were drawn incorporating the defect and placing the expected incisions within the relaxed skin tension lines where possible.    The area thus outlined was incised deep to adipose tissue with a #15 scalpel blade.  The skin margins were undermined to an appropriate distance in all directions utilizing iris scissors.
Burow's Advancement Flap Text: The defect edges were debeveled with a #15 scalpel blade.  Given the location of the defect and the proximity to free margins a Burow's advancement flap was deemed most appropriate.  Using a sterile surgical marker, the appropriate advancement flap was drawn incorporating the defect and placing the expected incisions within the relaxed skin tension lines where possible.    The area thus outlined was incised deep to adipose tissue with a #15 scalpel blade.  The skin margins were undermined to an appropriate distance in all directions utilizing iris scissors.
Chonodrocutaneous Helical Advancement Flap Text: The defect edges were debeveled with a #15 scalpel blade. Given the location of the defect and the proximity to free margins a chondrocutaneous helical advancement flap was deemed most appropriate. Using a sterile surgical marker, the appropriate advancement flap was drawn incorporating the defect and placing the expected incisions within the relaxed skin tension lines where possible. The area thus outlined was incised deep to adipose tissue with a #15 scalpel blade. The skin margins were undermined to an appropriate distance in all directions utilizing iris scissors. Following this, the designed flap was advanced and carried over into the primary defect and sutured into place.
Crescentic Advancement Flap Text: The defect edges were debeveled with a #15 scalpel blade.  Given the location of the defect and the proximity to free margins a crescentic advancement flap was deemed most appropriate.  Using a sterile surgical marker, the appropriate advancement flap was drawn incorporating the defect and placing the expected incisions within the relaxed skin tension lines where possible.    The area thus outlined was incised deep to adipose tissue with a #15 scalpel blade.  The skin margins were undermined to an appropriate distance in all directions utilizing iris scissors.
A-T Advancement Flap Text: The defect edges were debeveled with a #15 scalpel blade.  Given the location of the defect, shape of the defect and the proximity to free margins an A-T advancement flap was deemed most appropriate.  Using a sterile surgical marker, an appropriate advancement flap was drawn incorporating the defect and placing the expected incisions within the relaxed skin tension lines where possible.    The area thus outlined was incised deep to adipose tissue with a #15 scalpel blade.  The skin margins were undermined to an appropriate distance in all directions utilizing iris scissors.
O-T Advancement Flap Text: The defect edges were debeveled with a #15 scalpel blade.  Given the location of the defect, shape of the defect and the proximity to free margins an O-T advancement flap was deemed most appropriate.  Using a sterile surgical marker, an appropriate advancement flap was drawn incorporating the defect and placing the expected incisions within the relaxed skin tension lines where possible.    The area thus outlined was incised deep to adipose tissue with a #15 scalpel blade.  The skin margins were undermined to an appropriate distance in all directions utilizing iris scissors.
O-L Flap Text: The defect edges were debeveled with a #15 scalpel blade.  Given the location of the defect, shape of the defect and the proximity to free margins an O-L flap was deemed most appropriate.  Using a sterile surgical marker, an appropriate advancement flap was drawn incorporating the defect and placing the expected incisions within the relaxed skin tension lines where possible.    The area thus outlined was incised deep to adipose tissue with a #15 scalpel blade.  The skin margins were undermined to an appropriate distance in all directions utilizing iris scissors.
O-Z Flap Text: The defect edges were debeveled with a #15 scalpel blade. Given the location of the defect, shape of the defect and the proximity to free margins an O-Z flap was deemed most appropriate. Using a sterile surgical marker, an appropriate transposition flap was drawn incorporating the defect and placing the expected incisions within the relaxed skin tension lines where possible. The area thus outlined was incised deep to adipose tissue with a #15 scalpel blade. The skin margins were undermined to an appropriate distance in all directions utilizing iris scissors. Following this, the designed flap was carried over into the primary defect and sutured into place.
Double O-Z Flap Text: The defect edges were debeveled with a #15 scalpel blade. Given the location of the defect, shape of the defect and the proximity to free margins a Double O-Z flap was deemed most appropriate. Using a sterile surgical marker, an appropriate transposition flap was drawn incorporating the defect and placing the expected incisions within the relaxed skin tension lines where possible. The area thus outlined was incised deep to adipose tissue with a #15 scalpel blade. The skin margins were undermined to an appropriate distance in all directions utilizing iris scissors. Following this, the designed flap was carried over into the primary defect and sutured into place.
V-Y Flap Text: The defect edges were debeveled with a #15 scalpel blade.  Given the location of the defect, shape of the defect and the proximity to free margins a V-Y flap was deemed most appropriate.  Using a sterile surgical marker, an appropriate advancement flap was drawn incorporating the defect and placing the expected incisions within the relaxed skin tension lines where possible.    The area thus outlined was incised deep to adipose tissue with a #15 scalpel blade.  The skin margins were undermined to an appropriate distance in all directions utilizing iris scissors.
Advancement-Rotation Flap Text: The defect edges were debeveled with a #15 scalpel blade.  Given the location of the defect, shape of the defect and the proximity to free margins an advancement-rotation flap was deemed most appropriate.  Using a sterile surgical marker, an appropriate flap was drawn incorporating the defect and placing the expected incisions within the relaxed skin tension lines where possible. The area thus outlined was incised deep to adipose tissue with a #15 scalpel blade.  The skin margins were undermined to an appropriate distance in all directions utilizing iris scissors.
Mercedes Flap Text: The defect edges were debeveled with a #15 scalpel blade.  Given the location of the defect, shape of the defect and the proximity to free margins a Mercedes flap was deemed most appropriate.  Using a sterile surgical marker, an appropriate advancement flap was drawn incorporating the defect and placing the expected incisions within the relaxed skin tension lines where possible. The area thus outlined was incised deep to adipose tissue with a #15 scalpel blade.  The skin margins were undermined to an appropriate distance in all directions utilizing iris scissors.
Modified Advancement Flap Text: The defect edges were debeveled with a #15 scalpel blade.  Given the location of the defect, shape of the defect and the proximity to free margins a modified advancement flap was deemed most appropriate.  Using a sterile surgical marker, an appropriate advancement flap was drawn incorporating the defect and placing the expected incisions within the relaxed skin tension lines where possible.    The area thus outlined was incised deep to adipose tissue with a #15 scalpel blade.  The skin margins were undermined to an appropriate distance in all directions utilizing iris scissors.
Mucosal Advancement Flap Text: Given the location of the defect, shape of the defect and the proximity to free margins a mucosal advancement flap was deemed most appropriate. Incisions were made with a 15 blade scalpel in the appropriate fashion along the cutaneous vermilion border and the mucosal lip. The remaining actinically damaged mucosal tissue was excised.  The mucosal advancement flap was then elevated to the gingival sulcus with care taken to preserve the neurovascular structures and advanced into the primary defect. Care was taken to ensure that precise realignment of the vermilion border was achieved.
Peng Advancement Flap Text: The defect edges were debeveled with a #15 scalpel blade. Given the location of the defect, shape of the defect and the proximity to free margins a Peng advancement flap was deemed most appropriate. Using a sterile surgical marker, an appropriate advancement flap was drawn incorporating the defect and placing the expected incisions within the relaxed skin tension lines where possible. The area thus outlined was incised deep to adipose tissue with a #15 scalpel blade. The skin margins were undermined to an appropriate distance in all directions utilizing iris scissors. Following this, the designed flap was advanced and carried over into the primary defect and sutured into place.
Hatchet Flap Text: The defect edges were debeveled with a #15 scalpel blade.  Given the location of the defect, shape of the defect and the proximity to free margins a hatchet flap was deemed most appropriate.  Using a sterile surgical marker, an appropriate hatchet flap was drawn incorporating the defect and placing the expected incisions within the relaxed skin tension lines where possible.    The area thus outlined was incised deep to adipose tissue with a #15 scalpel blade.  The skin margins were undermined to an appropriate distance in all directions utilizing iris scissors.
Rotation Flap Text: The defect edges were debeveled with a #15 scalpel blade.  Given the location of the defect, shape of the defect and the proximity to free margins a rotation flap was deemed most appropriate.  Using a sterile surgical marker, an appropriate rotation flap was drawn incorporating the defect and placing the expected incisions within the relaxed skin tension lines where possible.    The area thus outlined was incised deep to adipose tissue with a #15 scalpel blade.  The skin margins were undermined to an appropriate distance in all directions utilizing iris scissors.
Bilateral Rotation Flap Text: The defect edges were debeveled with a #15 scalpel blade. Given the location of the defect, shape of the defect and the proximity to free margins a bilateral rotation flap was deemed most appropriate. Using a sterile surgical marker, an appropriate rotation flap was drawn incorporating the defect and placing the expected incisions within the relaxed skin tension lines where possible. The area thus outlined was incised deep to adipose tissue with a #15 scalpel blade. The skin margins were undermined to an appropriate distance in all directions utilizing iris scissors. Following this, the designed flap was carried over into the primary defect and sutured into place.
Spiral Flap Text: The defect edges were debeveled with a #15 scalpel blade.  Given the location of the defect, shape of the defect and the proximity to free margins a spiral flap was deemed most appropriate.  Using a sterile surgical marker, an appropriate rotation flap was drawn incorporating the defect and placing the expected incisions within the relaxed skin tension lines where possible. The area thus outlined was incised deep to adipose tissue with a #15 scalpel blade.  The skin margins were undermined to an appropriate distance in all directions utilizing iris scissors.
Staged Advancement Flap Text: The defect edges were debeveled with a #15 scalpel blade. Given the location of the defect, shape of the defect and the proximity to free margins a staged advancement flap was deemed most appropriate. Using a sterile surgical marker, an appropriate advancement flap was drawn incorporating the defect and placing the expected incisions within the relaxed skin tension lines where possible. The area thus outlined was incised deep to adipose tissue with a #15 scalpel blade. The skin margins were undermined to an appropriate distance in all directions utilizing iris scissors. Following this, the designed flap was carried over into the primary defect and sutured into place.
Star Wedge Flap Text: The defect edges were debeveled with a #15 scalpel blade.  Given the location of the defect, shape of the defect and the proximity to free margins a star wedge flap was deemed most appropriate.  Using a sterile surgical marker, an appropriate rotation flap was drawn incorporating the defect and placing the expected incisions within the relaxed skin tension lines where possible. The area thus outlined was incised deep to adipose tissue with a #15 scalpel blade.  The skin margins were undermined to an appropriate distance in all directions utilizing iris scissors.
Transposition Flap Text: The defect edges were debeveled with a #15 scalpel blade.  Given the location of the defect and the proximity to free margins a transposition flap was deemed most appropriate.  Using a sterile surgical marker, an appropriate transposition flap was drawn incorporating the defect.    The area thus outlined was incised deep to adipose tissue with a #15 scalpel blade.  The skin margins were undermined to an appropriate distance in all directions utilizing iris scissors.
Muscle Hinge Flap Text: The defect edges were debeveled with a #15 scalpel blade.  Given the size, depth and location of the defect and the proximity to free margins a muscle hinge flap was deemed most appropriate.  Using a sterile surgical marker, an appropriate hinge flap was drawn incorporating the defect. The area thus outlined was incised with a #15 scalpel blade.  The skin margins were undermined to an appropriate distance in all directions utilizing iris scissors.
Mustarde Flap Text: The defect edges were debeveled with a #15 scalpel blade.  Given the size, depth and location of the defect and the proximity to free margins a Mustarde flap was deemed most appropriate. Using a sterile surgical marker, an appropriate flap was drawn incorporating the defect. The area thus outlined was incised with a #15 scalpel blade. The skin margins were undermined to an appropriate distance in all directions utilizing iris scissors. Following this, the designed flap was carried into the primary defect and sutured into place.
Nasal Turnover Hinge Flap Text: The defect edges were debeveled with a #15 scalpel blade.  Given the size, depth, location of the defect and the defect being full thickness a nasal turnover hinge flap was deemed most appropriate. Using a sterile surgical marker, an appropriate hinge flap was drawn incorporating the defect. The area thus outlined was incised with a #15 scalpel blade. The flap was designed to recreate the nasal mucosal lining and the alar rim. The skin margins were undermined to an appropriate distance in all directions utilizing iris scissors. Following this, the designed flap was carried over into the primary defect and sutured into place
Nasalis-Muscle-Based Myocutaneous Island Pedicle Flap Text: Using a #15 blade, an incision was made around the donor flap to the level of the nasalis muscle. Wide lateral undermining was then performed in both the subcutaneous plane above the nasalis muscle, and in a submuscular plane just above periosteum. This allowed the formation of a free nasalis muscle axial pedicle (based on the angular artery) which was still attached to the actual cutaneous flap, increasing its mobility and vascular viability. Hemostasis was obtained with pinpoint electrocoagulation. The flap was mobilized into position and the pivotal anchor points positioned and stabilized with buried interrupted sutures. Subcutaneous and dermal tissues were closed in a multilayered fashion with sutures. Tissue redundancies were excised, and the epidermal edges were apposed without significant tension and sutured with sutures.
Nasalis Myocutaneous Flap Text: Using a #15 blade, an incision was made around the donor flap to the level of the nasalis muscle. Wide lateral undermining was then performed in both the subcutaneous plane above the nasalis muscle, and in a submuscular plane just above periosteum. This allowed the formation of a free nasalis muscle axial pedicle which was still attached to the actual cutaneous flap, increasing its mobility and vascular viability. Hemostasis was obtained with pinpoint electrocoagulation. The flap was mobilized into position and the pivotal anchor points positioned and stabilized with buried interrupted sutures. Subcutaneous and dermal tissues were closed in a multilayered fashion with sutures. Tissue redundancies were excised, and the epidermal edges were apposed without significant tension and sutured with sutures.
Orbicularis Oris Muscle Flap Text: The defect edges were debeveled with a #15 scalpel blade.  Given that the defect affected the competency of the oral sphincter an orbicularis oris muscle flap was deemed most appropriate to restore this competency and normal muscle function.  Using a sterile surgical marker, an appropriate flap was drawn incorporating the defect. The area thus outlined was incised with a #15 scalpel blade. Following this, the designed flap was carried over into the primary defect and sutured into place.
Melolabial Transposition Flap Text: The defect edges were debeveled with a #15 scalpel blade.  Given the location of the defect and the proximity to free margins a melolabial flap was deemed most appropriate.  Using a sterile surgical marker, an appropriate melolabial transposition flap was drawn incorporating the defect.    The area thus outlined was incised deep to adipose tissue with a #15 scalpel blade.  The skin margins were undermined to an appropriate distance in all directions utilizing iris scissors.
Rectangular Flap Text: The defect edges were debeveled with a #15 scalpel blade. Given the location of the defect and the proximity to free margins a rectangular flap was deemed most appropriate. Using a sterile surgical marker, an appropriate rectangular flap was drawn incorporating the defect. The area thus outlined was incised deep to adipose tissue with a #15 scalpel blade. The skin margins were undermined to an appropriate distance in all directions utilizing iris scissors. Following this, the designed flap was carried over into the primary defect and sutured into place.
Rhombic Flap Text: The defect edges were debeveled with a #15 scalpel blade.  Given the location of the defect and the proximity to free margins a rhombic flap was deemed most appropriate.  Using a sterile surgical marker, an appropriate rhombic flap was drawn incorporating the defect.    The area thus outlined was incised deep to adipose tissue with a #15 scalpel blade.  The skin margins were undermined to an appropriate distance in all directions utilizing iris scissors.
Rhomboid Transposition Flap Text: The defect edges were debeveled with a #15 scalpel blade. Given the location of the defect and the proximity to free margins a rhomboid transposition flap was deemed most appropriate. Using a sterile surgical marker, an appropriate rhomboid flap was drawn incorporating the defect. The area thus outlined was incised deep to adipose tissue with a #15 scalpel blade. The skin margins were undermined to an appropriate distance in all directions utilizing iris scissors. Following this, the designed flap was carried over into the primary defect and sutured into place.
Bi-Rhombic Flap Text: The defect edges were debeveled with a #15 scalpel blade.  Given the location of the defect and the proximity to free margins a bi-rhombic flap was deemed most appropriate.  Using a sterile surgical marker, an appropriate rhombic flap was drawn incorporating the defect. The area thus outlined was incised deep to adipose tissue with a #15 scalpel blade.  The skin margins were undermined to an appropriate distance in all directions utilizing iris scissors.
Helical Rim Advancement Flap Text: The defect edges were debeveled with a #15 blade scalpel.  Given the location of the defect and the proximity to free margins (helical rim) a double helical rim advancement flap was deemed most appropriate.  Using a sterile surgical marker, the appropriate advancement flaps were drawn incorporating the defect and placing the expected incisions between the helical rim and antihelix where possible.  The area thus outlined was incised through and through with a #15 scalpel blade.  With a skin hook and iris scissors, the flaps were gently and sharply undermined and freed up.
Bilateral Helical Rim Advancement Flap Text: The defect edges were debeveled with a #15 blade scalpel.  Given the location of the defect and the proximity to free margins (helical rim) a bilateral helical rim advancement flap was deemed most appropriate.  Using a sterile surgical marker, the appropriate advancement flaps were drawn incorporating the defect and placing the expected incisions between the helical rim and antihelix where possible.  The area thus outlined was incised through and through with a #15 scalpel blade.  With a skin hook and iris scissors, the flaps were gently and sharply undermined and freed up.
Ear Star Wedge Flap Text: The defect edges were debeveled with a #15 blade scalpel.  Given the location of the defect and the proximity to free margins (helical rim) an ear star wedge flap was deemed most appropriate.  Using a sterile surgical marker, the appropriate flap was drawn incorporating the defect and placing the expected incisions between the helical rim and antihelix where possible.  The area thus outlined was incised through and through with a #15 scalpel blade.
Banner Transposition Flap Text: The defect edges were debeveled with a #15 scalpel blade.  Given the location of the defect and the proximity to free margins a Banner transposition flap was deemed most appropriate.  Using a sterile surgical marker, an appropriate flap drawn around the defect. The area thus outlined was incised deep to adipose tissue with a #15 scalpel blade.  The skin margins were undermined to an appropriate distance in all directions utilizing iris scissors.
Bilobed Flap Text: The defect edges were debeveled with a #15 scalpel blade.  Given the location of the defect and the proximity to free margins a bilobe flap was deemed most appropriate.  Using a sterile surgical marker, an appropriate bilobe flap drawn around the defect.    The area thus outlined was incised deep to adipose tissue with a #15 scalpel blade.  The skin margins were undermined to an appropriate distance in all directions utilizing iris scissors.
Bilobed Transposition Flap Text: The defect edges were debeveled with a #15 scalpel blade.  Given the location of the defect and the proximity to free margins a bilobed transposition flap was deemed most appropriate.  Using a sterile surgical marker, an appropriate bilobe flap drawn around the defect.    The area thus outlined was incised deep to adipose tissue with a #15 scalpel blade.  The skin margins were undermined to an appropriate distance in all directions utilizing iris scissors.
Trilobed Flap Text: The defect edges were debeveled with a #15 scalpel blade.  Given the location of the defect and the proximity to free margins a trilobed flap was deemed most appropriate.  Using a sterile surgical marker, an appropriate trilobed flap drawn around the defect.    The area thus outlined was incised deep to adipose tissue with a #15 scalpel blade.  The skin margins were undermined to an appropriate distance in all directions utilizing iris scissors.
Dorsal Nasal Flap Text: The defect edges were debeveled with a #15 scalpel blade.  Given the location of the defect and the proximity to free margins a dorsal nasal flap was deemed most appropriate.  Using a sterile surgical marker, an appropriate dorsal nasal flap was drawn around the defect.    The area thus outlined was incised deep to adipose tissue with a #15 scalpel blade.  The skin margins were undermined to an appropriate distance in all directions utilizing iris scissors.
Island Pedicle Flap Text: The defect edges were debeveled with a #15 scalpel blade.  Given the location of the defect, shape of the defect and the proximity to free margins an island pedicle advancement flap was deemed most appropriate.  Using a sterile surgical marker, an appropriate advancement flap was drawn incorporating the defect, outlining the appropriate donor tissue and placing the expected incisions within the relaxed skin tension lines where possible.    The area thus outlined was incised deep to adipose tissue with a #15 scalpel blade.  The skin margins were undermined to an appropriate distance in all directions around the primary defect and laterally outward around the island pedicle utilizing iris scissors.  There was minimal undermining beneath the pedicle flap.
Island Pedicle Flap With Canthal Suspension Text: The defect edges were debeveled with a #15 scalpel blade.  Given the location of the defect, shape of the defect and the proximity to free margins an island pedicle advancement flap was deemed most appropriate.  Using a sterile surgical marker, an appropriate advancement flap was drawn incorporating the defect, outlining the appropriate donor tissue and placing the expected incisions within the relaxed skin tension lines where possible. The area thus outlined was incised deep to adipose tissue with a #15 scalpel blade.  The skin margins were undermined to an appropriate distance in all directions around the primary defect and laterally outward around the island pedicle utilizing iris scissors.  There was minimal undermining beneath the pedicle flap. A suspension suture was placed in the canthal tendon to prevent tension and prevent ectropion.
Alar Island Pedicle Flap Text: The defect edges were debeveled with a #15 scalpel blade.  Given the location of the defect, shape of the defect and the proximity to the alar rim an island pedicle advancement flap was deemed most appropriate.  Using a sterile surgical marker, an appropriate advancement flap was drawn incorporating the defect, outlining the appropriate donor tissue and placing the expected incisions within the nasal ala running parallel to the alar rim. The area thus outlined was incised with a #15 scalpel blade.  The skin margins were undermined minimally to an appropriate distance in all directions around the primary defect and laterally outward around the island pedicle utilizing iris scissors.  There was minimal undermining beneath the pedicle flap.
Double Island Pedicle Flap Text: The defect edges were debeveled with a #15 scalpel blade.  Given the location of the defect, shape of the defect and the proximity to free margins a double island pedicle advancement flap was deemed most appropriate.  Using a sterile surgical marker, an appropriate advancement flap was drawn incorporating the defect, outlining the appropriate donor tissue and placing the expected incisions within the relaxed skin tension lines where possible.    The area thus outlined was incised deep to adipose tissue with a #15 scalpel blade.  The skin margins were undermined to an appropriate distance in all directions around the primary defect and laterally outward around the island pedicle utilizing iris scissors.  There was minimal undermining beneath the pedicle flap.
Island Pedicle Flap-Requiring Vessel Identification Text: The defect edges were debeveled with a #15 scalpel blade.  Given the location of the defect, shape of the defect and the proximity to free margins an island pedicle advancement flap was deemed most appropriate.  Using a sterile surgical marker, an appropriate advancement flap was drawn, based on the axial vessel mentioned above, incorporating the defect, outlining the appropriate donor tissue and placing the expected incisions within the relaxed skin tension lines where possible.    The area thus outlined was incised deep to adipose tissue with a #15 scalpel blade.  The skin margins were undermined to an appropriate distance in all directions around the primary defect and laterally outward around the island pedicle utilizing iris scissors.  There was minimal undermining beneath the pedicle flap.
Keystone Flap Text: The defect edges were debeveled with a #15 scalpel blade.  Given the location of the defect, shape of the defect a keystone flap was deemed most appropriate.  Using a sterile surgical marker, an appropriate keystone flap was drawn incorporating the defect, outlining the appropriate donor tissue and placing the expected incisions within the relaxed skin tension lines where possible. The area thus outlined was incised deep to adipose tissue with a #15 scalpel blade.  The skin margins were undermined to an appropriate distance in all directions around the primary defect and laterally outward around the flap utilizing iris scissors.
O-T Plasty Text: The defect edges were debeveled with a #15 scalpel blade.  Given the location of the defect, shape of the defect and the proximity to free margins an O-T plasty was deemed most appropriate.  Using a sterile surgical marker, an appropriate O-T plasty was drawn incorporating the defect and placing the expected incisions within the relaxed skin tension lines where possible.    The area thus outlined was incised deep to adipose tissue with a #15 scalpel blade.  The skin margins were undermined to an appropriate distance in all directions utilizing iris scissors.
O-Z Plasty Text: The defect edges were debeveled with a #15 scalpel blade.  Given the location of the defect, shape of the defect and the proximity to free margins an O-Z plasty (double transposition flap) was deemed most appropriate.  Using a sterile surgical marker, the appropriate transposition flaps were drawn incorporating the defect and placing the expected incisions within the relaxed skin tension lines where possible.    The area thus outlined was incised deep to adipose tissue with a #15 scalpel blade.  The skin margins were undermined to an appropriate distance in all directions utilizing iris scissors.  Hemostasis was achieved with electrocautery.  The flaps were then transposed into place, one clockwise and the other counterclockwise, and anchored with interrupted buried subcutaneous sutures.
Double O-Z Plasty Text: The defect edges were debeveled with a #15 scalpel blade. Given the location of the defect, shape of the defect and the proximity to free margins a Double O-Z plasty (double transposition flap) was deemed most appropriate. Using a sterile surgical marker, the appropriate transposition flaps were drawn incorporating the defect and placing the expected incisions within the relaxed skin tension lines where possible. The area thus outlined was incised deep to adipose tissue with a #15 scalpel blade. The skin margins were undermined to an appropriate distance in all directions utilizing iris scissors. Hemostasis was achieved with electrocautery. The flaps were then transposed and carried over into place, one clockwise and the other counterclockwise, and anchored with interrupted buried subcutaneous sutures.
V-Y Plasty Text: The defect edges were debeveled with a #15 scalpel blade.  Given the location of the defect, shape of the defect and the proximity to free margins an V-Y advancement flap was deemed most appropriate.  Using a sterile surgical marker, an appropriate advancement flap was drawn incorporating the defect and placing the expected incisions within the relaxed skin tension lines where possible.    The area thus outlined was incised deep to adipose tissue with a #15 scalpel blade.  The skin margins were undermined to an appropriate distance in all directions utilizing iris scissors.
H Plasty Text: Given the location of the defect, shape of the defect and the proximity to free margins a H-plasty was deemed most appropriate for repair.  Using a sterile surgical marker, the appropriate advancement arms of the H-plasty were drawn incorporating the defect and placing the expected incisions within the relaxed skin tension lines where possible. The area thus outlined was incised deep to adipose tissue with a #15 scalpel blade. The skin margins were undermined to an appropriate distance in all directions utilizing iris scissors.  The opposing advancement arms were then advanced into place in opposite direction and anchored with interrupted buried subcutaneous sutures.
W Plasty Text: The lesion was extirpated to the level of the fat with a #15 scalpel blade.  Given the location of the defect, shape of the defect and the proximity to free margins a W-plasty was deemed most appropriate for repair.  Using a sterile surgical marker, the appropriate transposition arms of the W-plasty were drawn incorporating the defect and placing the expected incisions within the relaxed skin tension lines where possible.    The area thus outlined was incised deep to adipose tissue with a #15 scalpel blade.  The skin margins were undermined to an appropriate distance in all directions utilizing iris scissors.  The opposing transposition arms were then transposed into place in opposite direction and anchored with interrupted buried subcutaneous sutures.
Z Plasty Text: The lesion was extirpated to the level of the fat with a #15 scalpel blade.  Given the location of the defect, shape of the defect and the proximity to free margins a Z-plasty was deemed most appropriate for repair.  Using a sterile surgical marker, the appropriate transposition arms of the Z-plasty were drawn incorporating the defect and placing the expected incisions within the relaxed skin tension lines where possible.    The area thus outlined was incised deep to adipose tissue with a #15 scalpel blade.  The skin margins were undermined to an appropriate distance in all directions utilizing iris scissors.  The opposing transposition arms were then transposed into place in opposite direction and anchored with interrupted buried subcutaneous sutures.
Double Z Plasty Text: The lesion was extirpated to the level of the fat with a #15 scalpel blade. Given the location of the defect, shape of the defect and the proximity to free margins a double Z-plasty was deemed most appropriate for repair. Using a sterile surgical marker, the appropriate transposition arms of the double Z-plasty were drawn incorporating the defect and placing the expected incisions within the relaxed skin tension lines where possible. The area thus outlined was incised deep to adipose tissue with a #15 scalpel blade. The skin margins were undermined to an appropriate distance in all directions utilizing iris scissors. The opposing transposition arms were then transposed and carried over into place in opposite direction and anchored with interrupted buried subcutaneous sutures.
Zygomaticofacial Flap Text: Given the location of the defect, shape of the defect and the proximity to free margins a zygomaticofacial flap was deemed most appropriate for repair. Using a sterile surgical marker, the appropriate flap was drawn incorporating the defect and placing the expected incisions within the relaxed skin tension lines where possible. The area thus outlined was incised deep to adipose tissue with a #15 scalpel blade with preservation of a vascular pedicle.  The skin margins were undermined to an appropriate distance in all directions utilizing iris scissors. The flap was then carried over into the defect and anchored with interrupted buried subcutaneous sutures.
Cheek Interpolation Flap Text: A decision was made to reconstruct the defect utilizing an interpolation axial flap and a staged reconstruction.  A telfa template was made of the defect.  This telfa template was then used to outline the Cheek Interpolation flap.  The donor area for the pedicle flap was then injected with anesthesia.  The flap was excised through the skin and subcutaneous tissue down to the layer of the underlying musculature.  The interpolation flap was carefully excised within this deep plane to maintain its blood supply.  The edges of the donor site were undermined.   The donor site was closed in a primary fashion.  The pedicle was then rotated into position and sutured.  Once the tube was sutured into place, adequate blood supply was confirmed with blanching and refill.  The pedicle was then wrapped with xeroform gauze and dressed appropriately with a telfa and gauze bandage to ensure continued blood supply and protect the attached pedicle.
Cheek-To-Nose Interpolation Flap Text: A decision was made to reconstruct the defect utilizing an interpolation axial flap and a staged reconstruction.  A telfa template was made of the defect.  This telfa template was then used to outline the Cheek-To-Nose Interpolation flap.  The donor area for the pedicle flap was then injected with anesthesia.  The flap was excised through the skin and subcutaneous tissue down to the layer of the underlying musculature.  The interpolation flap was carefully excised within this deep plane to maintain its blood supply.  The edges of the donor site were undermined.   The donor site was closed in a primary fashion.  The pedicle was then rotated into position and sutured.  Once the tube was sutured into place, adequate blood supply was confirmed with blanching and refill.  The pedicle was then wrapped with xeroform gauze and dressed appropriately with a telfa and gauze bandage to ensure continued blood supply and protect the attached pedicle.
Interpolation Flap Text: A decision was made to reconstruct the defect utilizing an interpolation axial flap and a staged reconstruction.  A telfa template was made of the defect.  This telfa template was then used to outline the interpolation flap.  The donor area for the pedicle flap was then injected with anesthesia.  The flap was excised through the skin and subcutaneous tissue down to the layer of the underlying musculature.  The interpolation flap was carefully excised within this deep plane to maintain its blood supply.  The edges of the donor site were undermined.   The donor site was closed in a primary fashion.  The pedicle was then rotated into position and sutured.  Once the tube was sutured into place, adequate blood supply was confirmed with blanching and refill.  The pedicle was then wrapped with xeroform gauze and dressed appropriately with a telfa and gauze bandage to ensure continued blood supply and protect the attached pedicle.
Melolabial Interpolation Flap Text: A decision was made to reconstruct the defect utilizing an interpolation axial flap and a staged reconstruction.  A telfa template was made of the defect.  This telfa template was then used to outline the melolabial interpolation flap.  The donor area for the pedicle flap was then injected with anesthesia.  The flap was excised through the skin and subcutaneous tissue down to the layer of the underlying musculature.  The pedicle flap was carefully excised within this deep plane to maintain its blood supply.  The edges of the donor site were undermined.   The donor site was closed in a primary fashion.  The pedicle was then rotated into position and sutured.  Once the tube was sutured into place, adequate blood supply was confirmed with blanching and refill.  The pedicle was then wrapped with xeroform gauze and dressed appropriately with a telfa and gauze bandage to ensure continued blood supply and protect the attached pedicle.
Mastoid Interpolation Flap Text: A decision was made to reconstruct the defect utilizing an interpolation axial flap and a staged reconstruction.  A telfa template was made of the defect.  This telfa template was then used to outline the mastoid interpolation flap.  The donor area for the pedicle flap was then injected with anesthesia.  The flap was excised through the skin and subcutaneous tissue down to the layer of the underlying musculature.  The pedicle flap was carefully excised within this deep plane to maintain its blood supply.  The edges of the donor site were undermined.   The donor site was closed in a primary fashion.  The pedicle was then rotated into position and sutured.  Once the tube was sutured into place, adequate blood supply was confirmed with blanching and refill.  The pedicle was then wrapped with xeroform gauze and dressed appropriately with a telfa and gauze bandage to ensure continued blood supply and protect the attached pedicle.
Posterior Auricular Interpolation Flap Text: A decision was made to reconstruct the defect utilizing an interpolation axial flap and a staged reconstruction.  A telfa template was made of the defect.  This telfa template was then used to outline the posterior auricular interpolation flap.  The donor area for the pedicle flap was then injected with anesthesia.  The flap was excised through the skin and subcutaneous tissue down to the layer of the underlying musculature.  The pedicle flap was carefully excised within this deep plane to maintain its blood supply.  The edges of the donor site were undermined.   The donor site was closed in a primary fashion.  The pedicle was then rotated into position and sutured.  Once the tube was sutured into place, adequate blood supply was confirmed with blanching and refill.  The pedicle was then wrapped with xeroform gauze and dressed appropriately with a telfa and gauze bandage to ensure continued blood supply and protect the attached pedicle.
Paramedian Forehead Flap Text: A decision was made to reconstruct the defect utilizing an interpolation axial flap and a staged reconstruction.  A telfa template was made of the defect.  This telfa template was then used to outline the paramedian forehead pedicle flap.  The donor area for the pedicle flap was then injected with anesthesia.  The flap was excised through the skin and subcutaneous tissue down to the layer of the underlying musculature.  The pedicle flap was carefully excised within this deep plane to maintain its blood supply.  The edges of the donor site were undermined.   The donor site was closed in a primary fashion.  The pedicle was then rotated into position and sutured.  Once the tube was sutured into place, adequate blood supply was confirmed with blanching and refill.  The pedicle was then wrapped with xeroform gauze and dressed appropriately with a telfa and gauze bandage to ensure continued blood supply and protect the attached pedicle.
Abbe Flap (Upper To Lower Lip) Text: The defect of the lower lip was assessed and measured.  Given the location and size of the defect, an Abbe flap was deemed most appropriate. Using a sterile surgical marker, an appropriate Abbe flap was measured and drawn on the upper lip. Local anesthesia was then infiltrated.  A scalpel was then used to incise the upper lip through and through the skin, vermilion, muscle and mucosa, leaving the flap pedicled on the opposite side.  The flap was then rotated and transferred to the lower lip defect.  The flap was then sutured into place with a three layer technique, closing the orbicularis oris muscle layer with subcutaneous buried sutures, followed by a mucosal layer and an epidermal layer.
Abbe Flap (Lower To Upper Lip) Text: The defect of the upper lip was assessed and measured.  Given the location and size of the defect, an Abbe flap was deemed most appropriate. Using a sterile surgical marker, an appropriate Abbe flap was measured and drawn on the lower lip. Local anesthesia was then infiltrated. A scalpel was then used to incise the upper lip through and through the skin, vermilion, muscle and mucosa, leaving the flap pedicled on the opposite side.  The flap was then rotated and transferred to the lower lip defect.  The flap was then sutured into place with a three layer technique, closing the orbicularis oris muscle layer with subcutaneous buried sutures, followed by a mucosal layer and an epidermal layer.
Estlander Flap (Upper To Lower Lip) Text: The defect of the lower lip was assessed and measured.  Given the location and size of the defect, an Estlander flap was deemed most appropriate. Using a sterile surgical marker, an appropriate Estlander flap was measured and drawn on the upper lip. Local anesthesia was then infiltrated. A scalpel was then used to incise the lateral aspect of the flap, through skin, muscle and mucosa, leaving the flap pedicled medially.  The flap was then rotated and positioned to fill the lower lip defect.  The flap was then sutured into place with a three layer technique, closing the orbicularis oris muscle layer with subcutaneous buried sutures, followed by a mucosal layer and an epidermal layer.
Cheiloplasty (Less Than 50%) Text: A decision was made to reconstruct the defect with a  cheiloplasty.  The defect was undermined extensively.  Additional obicularis oris muscle was excised with a 15 blade scalpel.  The defect was converted into a full thickness wedge, of less than 50% of the vertical height of the lip, to facilite a better cosmetic result.  Small vessels were then tied off with 5-0 monocyrl. The obicularis oris, superficial fascia, adipose and dermis were then reapproximated.  After the deeper layers were approximated the epidermis was reapproximated with particular care given to realign the vermilion border.
Cheiloplasty (Complex) Text: A decision was made to reconstruct the defect with a  cheiloplasty.  The defect was undermined extensively.  Additional obicularis oris muscle was excised with a 15 blade scalpel.  The defect was converted into a full thickness wedge to facilite a better cosmetic result.  Small vessels were then tied off with 5-0 monocyrl. The obicularis oris, superficial fascia, adipose and dermis were then reapproximated.  After the deeper layers were approximated the epidermis was reapproximated with particular care given to realign the vermilion border.
Ear Wedge Repair Text: A wedge excision was completed by carrying down an excision through the full thickness of the ear and cartilage with an inward facing Burow's triangle. The wound was then closed in a layered fashion.
Full Thickness Lip Wedge Repair (Flap) Text: Given the location of the defect and the proximity to free margins a full thickness wedge repair was deemed most appropriate.  Using a sterile surgical marker, the appropriate repair was drawn incorporating the defect and placing the expected incisions perpendicular to the vermilion border.  The vermilion border was also meticulously outlined to ensure appropriate reapproximation during the repair.  The area thus outlined was incised through and through with a #15 scalpel blade.  The muscularis and dermis were reaproximated with deep sutures following hemostasis. Care was taken to realign the vermilion border before proceeding with the superficial closure.  Once the vermilion was realigned the superfical and mucosal closure was finished.
Ftsg Text: The defect edges were debeveled with a #15 scalpel blade.  Given the location of the defect, shape of the defect and the proximity to free margins a full thickness skin graft was deemed most appropriate.  Using a sterile surgical marker, the primary defect shape was transferred to the donor site. The area thus outlined was incised deep to adipose tissue with a #15 scalpel blade.  The harvested graft was then trimmed of adipose tissue until only dermis and epidermis was left.  The skin margins of the secondary defect were undermined to an appropriate distance in all directions utilizing iris scissors.  The secondary defect was closed with interrupted buried subcutaneous sutures.  The skin edges were then re-apposed with running  sutures.  The skin graft was then placed in the primary defect and oriented appropriately.
Split-Thickness Skin Graft Text: The defect edges were debeveled with a #15 scalpel blade.  Given the location of the defect, shape of the defect and the proximity to free margins a split thickness skin graft was deemed most appropriate.  Using a sterile surgical marker, the primary defect shape was transferred to the donor site. The split thickness graft was then harvested.  The skin graft was then placed in the primary defect and oriented appropriately.
Pinch Graft Text: The defect edges were debeveled with a #15 scalpel blade. Given the location of the defect, shape of the defect and the proximity to free margins a pinch graft was deemed most appropriate. Using a sterile surgical marker, the primary defect shape was transferred to the donor site. The area thus outlined was incised deep to adipose tissue with a #15 scalpel blade.  The harvested graft was then trimmed of adipose tissue until only dermis and epidermis was left. The skin margins of the secondary defect were undermined to an appropriate distance in all directions utilizing iris scissors.  The secondary defect was closed with interrupted buried subcutaneous sutures.  The skin edges were then re-apposed with running  sutures.  The skin graft was then placed in the primary defect and oriented appropriately.
Burow's Graft Text: The defect edges were debeveled with a #15 scalpel blade. Given the location of the defect, shape of the defect, the proximity to free margins and the presence of a standing cone deformity a Burow's skin graft was deemed most appropriate. The standing cone was removed and this tissue was then trimmed to the shape of the primary defect. The adipose tissue was also removed until only dermis and epidermis were left.  The skin margins of the secondary defect were undermined to an appropriate distance in all directions utilizing iris scissors.  The secondary defect was closed with interrupted buried subcutaneous sutures.  The skin edges were then re-apposed with running  sutures.  The skin graft was then placed in the primary defect and oriented appropriately.
Cartilage Graft Text: The defect edges were debeveled with a #15 scalpel blade.  Given the location of the defect, shape of the defect, the fact the defect involved a full thickness cartilage defect a cartilage graft was deemed most appropriate.  An appropriate donor site was identified, cleansed, and anesthetized. The cartilage graft was then harvested and transferred to the recipient site, oriented appropriately and then sutured into place.  The secondary defect was then repaired using a primary closure.
Composite Graft Text: The defect edges were debeveled with a #15 scalpel blade.  Given the location of the defect, shape of the defect, the proximity to free margins and the fact the defect was full thickness a composite graft was deemed most appropriate.  The defect was outline and then transferred to the donor site.  A full thickness graft was then excised from the donor site. The graft was then placed in the primary defect, oriented appropriately and then sutured into place.  The secondary defect was then repaired using a primary closure.
Epidermal Autograft Text: The defect edges were debeveled with a #15 scalpel blade.  Given the location of the defect, shape of the defect and the proximity to free margins an epidermal autograft was deemed most appropriate.  Using a sterile surgical marker, the primary defect shape was transferred to the donor site. The epidermal graft was then harvested.  The skin graft was then placed in the primary defect and oriented appropriately.
Dermal Autograft Text: The defect edges were debeveled with a #15 scalpel blade.  Given the location of the defect, shape of the defect and the proximity to free margins a dermal autograft was deemed most appropriate.  Using a sterile surgical marker, the primary defect shape was transferred to the donor site. The area thus outlined was incised deep to adipose tissue with a #15 scalpel blade.  The harvested graft was then trimmed of adipose and epidermal tissue until only dermis was left.  The skin graft was then placed in the primary defect and oriented appropriately.
Skin Substitute Text: The defect edges were debeveled with a #15 scalpel blade.  Given the location of the defect, shape of the defect and the proximity to free margins a skin substitute graft was deemed most appropriate.  The graft material was trimmed to fit the size of the defect. The graft was then placed in the primary defect and oriented appropriately.
Tissue Cultured Epidermal Autograft Text: The defect edges were debeveled with a #15 scalpel blade.  Given the location of the defect, shape of the defect and the proximity to free margins a tissue cultured epidermal autograft was deemed most appropriate.  The graft was then trimmed to fit the size of the defect.  The graft was then placed in the primary defect and oriented appropriately.
Xenograft Text: The defect edges were debeveled with a #15 scalpel blade.  Given the location of the defect, shape of the defect and the proximity to free margins a xenograft was deemed most appropriate.  The graft was then trimmed to fit the size of the defect.  The graft was then placed in the primary defect and oriented appropriately.
Purse String (Simple) Text: Given the location of the defect and the characteristics of the surrounding skin a purse string closure was deemed most appropriate.  Undermining was performed circumfirentially around the surgical defect.  A purse string suture was then placed and tightened.
Purse String (Intermediate) Text: Given the location of the defect and the characteristics of the surrounding skin a purse string intermediate closure was deemed most appropriate.  Undermining was performed circumfirentially around the surgical defect.  A purse string suture was then placed and tightened.
Partial Purse String (Simple) Text: Given the location of the defect and the characteristics of the surrounding skin a simple purse string closure was deemed most appropriate.  Undermining was performed circumfirentially around the surgical defect.  A purse string suture was then placed and tightened. Wound tension only allowed a partial closure of the circular defect.
Partial Purse String (Intermediate) Text: Given the location of the defect and the characteristics of the surrounding skin an intermediate purse string closure was deemed most appropriate.  Undermining was performed circumfirentially around the surgical defect.  A purse string suture was then placed and tightened. Wound tension only allowed a partial closure of the circular defect.
Localized Dermabrasion With Wire Brush Text: The patient was draped in routine manner.  Localized dermabrasion using 3 x 17 mm wire brush was performed in routine manner to papillary dermis. This spot dermabrasion is being performed to complete skin cancer reconstruction. It also will eliminate the other sun damaged precancerous cells that are known to be part of the regional effect of a lifetime's worth of sun exposure. This localized dermabrasion is therapeutic and should not be considered cosmetic in any regard.
Localized Dermabrasion With Sand Papertext: The patient was draped in routine manner.  Localized dermabrasion using sterile sand paper was performed in routine manner to papillary dermis. This spot dermabrasion is being performed to complete skin cancer reconstruction. It also will eliminate the other sun damaged precancerous cells that are known to be part of the regional effect of a lifetime's worth of sun exposure. This localized dermabrasion is therapeutic and should not be considered cosmetic in any regard.
Localized Dermabrasion With 15 Blade Text: The patient was draped in routine manner.  Localized dermabrasion using a 15 blade was performed in routine manner to papillary dermis. This spot dermabrasion is being performed to complete skin cancer reconstruction. It also will eliminate the other sun damaged precancerous cells that are known to be part of the regional effect of a lifetime's worth of sun exposure. This localized dermabrasion is therapeutic and should not be considered cosmetic in any regard.
Tarsorrhaphy Text: A tarsorrhaphy was performed using Frost sutures.
Intermediate Repair And Flap Additional Text (Will Appearing After The Standard Complex Repair Text): The intermediate repair was not sufficient to completely close the primary defect. The remaining additional defect was repaired with the flap mentioned below.
Intermediate Repair And Graft Additional Text (Will Appearing After The Standard Complex Repair Text): The intermediate repair was not sufficient to completely close the primary defect. The remaining additional defect was repaired with the graft mentioned below.
Complex Repair And Flap Additional Text (Will Appearing After The Standard Complex Repair Text): The complex repair was not sufficient to completely close the primary defect. The remaining additional defect was repaired with the flap mentioned below.
Complex Repair And Graft Additional Text (Will Appearing After The Standard Complex Repair Text): The complex repair was not sufficient to completely close the primary defect. The remaining additional defect was repaired with the graft mentioned below.
Eyelid Full Thickness Repair - 69962: The eyelid defect was full thickness which required a wedge repair of the eyelid. Special care was taken to ensure that the eyelid margin was realligned when placing sutures.
Eyelid Partial Thickness Repair - 00120: The eyelid defect was partial thickness which required a wedge repair of the eyelid. Special care was taken to ensure that the eyelid margin was realligned when placing sutures.
Manual Repair Warning Statement: We plan on removing the manually selected variable below in favor of our much easier automatic structured text blocks found in the previous tab. We decided to do this to help make the flow better and give you the full power of structured data. Manual selection is never going to be ideal in our platform and I would encourage you to avoid using manual selection from this point on, especially since I will be sunsetting this feature. It is important that you do one of two things with the customized text below. First, you can save all of the text in a word file so you can have it for future reference. Second, transfer the text to the appropriate area in the Library tab. Lastly, if there is a flap or graft type which we do not have you need to let us know right away so I can add it in before the variable is hidden. No need to panic, we plan to give you roughly 6 months to make the change.
Same Histology In Subsequent Stages Text: The pattern and morphology of the tumor is as described in the first stage.
No Residual Tumor Seen Histology Text: There were no malignant cells seen in the sections examined.
Inflammation Suggestive Of Cancer Camouflage Histology Text: There was a dense lymphocytic infiltrate which prevented adequate histologic evaluation of adjacent structures.
Bcc Histology Text: There were numerous aggregates of basaloid cells.
Bcc Infiltrative Histology Text: There were numerous aggregates of basaloid cells demonstrating an infiltrative pattern.
Mart-1 - Positive Histology Text: MART-1 staining demonstrates areas of higher density and clustering of melanocytes with Pagetoid spread upwards within the epidermis. The surgical margins are positive for tumor cells.
Mart-1 - Negative Histology Text: MART-1 staining demonstrates a normal density and pattern of melanocytes along the dermal-epidermal junction. The surgical margins are negative for tumor cells.
Information: Selecting Yes will display possible errors in your note based on the variables you have selected. This validation is only offered as a suggestion for you. PLEASE NOTE THAT THE VALIDATION TEXT WILL BE REMOVED WHEN YOU FINALIZE YOUR NOTE. IF YOU WANT TO FAX A PRELIMINARY NOTE YOU WILL NEED TO TOGGLE THIS TO 'NO' IF YOU DO NOT WANT IT IN YOUR FAXED NOTE.
Bill 59 Modifier?: No - Continue to Bill 79 Modifier

## 2024-05-08 ENCOUNTER — APPOINTMENT (RX ONLY)
Dept: URBAN - METROPOLITAN AREA CLINIC 22 | Facility: CLINIC | Age: 74
Setting detail: DERMATOLOGY
End: 2024-05-08

## 2024-05-08 ENCOUNTER — HOSPITAL ENCOUNTER (OUTPATIENT)
Dept: LAB | Facility: MEDICAL CENTER | Age: 74
End: 2024-05-08
Attending: FAMILY MEDICINE
Payer: MEDICARE

## 2024-05-08 DIAGNOSIS — Z48.02 ENCOUNTER FOR REMOVAL OF SUTURES: ICD-10-CM

## 2024-05-08 DIAGNOSIS — N18.31 CHRONIC RENAL FAILURE, STAGE 3A: ICD-10-CM

## 2024-05-08 PROCEDURE — 99024 POSTOP FOLLOW-UP VISIT: CPT

## 2024-05-08 PROCEDURE — ? SUTURE REMOVAL (GLOBAL PERIOD)

## 2024-05-08 ASSESSMENT — LOCATION SIMPLE DESCRIPTION DERM: LOCATION SIMPLE: LEFT LIP

## 2024-05-08 ASSESSMENT — LOCATION DETAILED DESCRIPTION DERM: LOCATION DETAILED: LEFT UPPER CUTANEOUS LIP

## 2024-05-08 ASSESSMENT — LOCATION ZONE DERM: LOCATION ZONE: LIP

## 2024-05-08 NOTE — PROCEDURE: SUTURE REMOVAL (GLOBAL PERIOD)
Detail Level: Detailed
Add 51222 Cpt? (Important Note: In 2017 The Use Of 97229 Is Being Tracked By Cms To Determine Future Global Period Reimbursement For Global Periods): yes

## 2024-05-09 LAB
ALBUMIN SERPL BCP-MCNC: 4 G/DL (ref 3.2–4.9)
BUN SERPL-MCNC: 15 MG/DL (ref 8–22)
CALCIUM ALBUM COR SERPL-MCNC: 9.3 MG/DL (ref 8.5–10.5)
CALCIUM SERPL-MCNC: 9.3 MG/DL (ref 8.5–10.5)
CHLORIDE SERPL-SCNC: 106 MMOL/L (ref 96–112)
CO2 SERPL-SCNC: 26 MMOL/L (ref 20–33)
CREAT SERPL-MCNC: 0.98 MG/DL (ref 0.5–1.4)
GFR SERPLBLD CREATININE-BSD FMLA CKD-EPI: 61 ML/MIN/1.73 M 2
GLUCOSE SERPL-MCNC: 89 MG/DL (ref 65–99)
PHOSPHATE SERPL-MCNC: 3.6 MG/DL (ref 2.5–4.5)
POTASSIUM SERPL-SCNC: 4.5 MMOL/L (ref 3.6–5.5)
SODIUM SERPL-SCNC: 140 MMOL/L (ref 135–145)

## 2024-06-07 ENCOUNTER — APPOINTMENT (RX ONLY)
Dept: URBAN - METROPOLITAN AREA CLINIC 22 | Facility: CLINIC | Age: 74
Setting detail: DERMATOLOGY
End: 2024-06-07

## 2024-06-07 DIAGNOSIS — Z48.817 ENCOUNTER FOR SURGICAL AFTERCARE FOLLOWING SURGERY ON THE SKIN AND SUBCUTANEOUS TISSUE: ICD-10-CM

## 2024-06-07 PROCEDURE — ? POST-OP WOUND EVALUATION

## 2024-06-07 PROCEDURE — ? INTRALESIONAL KENALOG

## 2024-06-07 PROCEDURE — 11900 INJECT SKIN LESIONS </W 7: CPT | Mod: 79

## 2024-06-07 PROCEDURE — 99024 POSTOP FOLLOW-UP VISIT: CPT

## 2024-06-07 ASSESSMENT — LOCATION DETAILED DESCRIPTION DERM: LOCATION DETAILED: LEFT UPPER CUTANEOUS LIP

## 2024-06-07 ASSESSMENT — LOCATION SIMPLE DESCRIPTION DERM: LOCATION SIMPLE: LEFT LIP

## 2024-06-07 ASSESSMENT — LOCATION ZONE DERM: LOCATION ZONE: LIP

## 2024-06-07 NOTE — PROCEDURE: POST-OP WOUND EVALUATION
Detail Level: Detailed
Add 61035 Cpt? (Important Note: In 2017 The Use Of 30905 Is Being Tracked By Cms To Determine Future Global Period Reimbursement For Global Periods): yes
Quality 355: Unplanned Reoperation Within The 30 Day Postoperative Period: No return to the operating room for a surgical procedure, for complications of the principal operative procedure, within 30 days of the principal operative procedure
Quality 357: Surgical Site Infection (Ssi): No surgical site infection
Wound Evaluated By (Optional): Trip Clay MD
Wound Diameter In Cm(Optional): 0
Wound Crusting?: clean
Wound Edema?: mild
Wound Color?: pink
Any New Or Residual Neoplasm?: No

## 2024-06-07 NOTE — PROCEDURE: INTRALESIONAL KENALOG
How Many Mls Were Removed From The 40 Mg/Ml (1ml) Vial When Preparing The Injectable Solution?: 0
Include Z78.9 (Other Specified Conditions Influencing Health Status) As An Associated Diagnosis?: No
Consent: The risks of atrophy were reviewed with the patient.
Medical Necessity Clause: This procedure was medically necessary because the lesions that were treated were:
Detail Level: Detailed
Total Volume (Ccs): 0.1
Validate Note Data When Using Inventory: Yes
Expiration Date For Kenalog (Optional): JAN 2025
Concentration Of Kenalog Solution Injected (Mg/Ml): 40.0
Kenalog Type Of Vial: Multiple Dose
Kenalog Preparation: Kenalog
Administered By (Optional): Trip Clay MD
Lot # For Kenalog (Optional): [id number]
Which Kenalog Vial Was Used?: Kenalog 40 mg/ml (5 ml vial)

## 2024-06-11 ENCOUNTER — RX ONLY (OUTPATIENT)
Age: 74
Setting detail: RX ONLY
End: 2024-06-11

## 2024-06-11 ENCOUNTER — APPOINTMENT (RX ONLY)
Dept: URBAN - METROPOLITAN AREA CLINIC 22 | Facility: CLINIC | Age: 74
Setting detail: DERMATOLOGY
End: 2024-06-11

## 2024-06-11 DIAGNOSIS — L30.4 ERYTHEMA INTERTRIGO: ICD-10-CM | Status: INADEQUATELY CONTROLLED

## 2024-06-11 PROBLEM — D48.5 NEOPLASM OF UNCERTAIN BEHAVIOR OF SKIN: Status: ACTIVE | Noted: 2024-06-11

## 2024-06-11 PROCEDURE — ? ADDITIONAL NOTES

## 2024-06-11 PROCEDURE — ? COUNSELING

## 2024-06-11 PROCEDURE — ? PRESCRIPTION

## 2024-06-11 PROCEDURE — 99214 OFFICE O/P EST MOD 30 MIN: CPT | Mod: 24

## 2024-06-11 PROCEDURE — ? PHOTO-DOCUMENTATION

## 2024-06-11 PROCEDURE — ? DEFER

## 2024-06-11 RX ORDER — FLUCONAZOLE 150 MG/1
TABLET ORAL
Qty: 4 | Refills: 1 | Status: ERX | COMMUNITY
Start: 2024-06-11

## 2024-06-11 RX ORDER — FLUCONAZOLE 150 MG/1
TABLET ORAL
Qty: 4 | Refills: 1 | Status: CANCELLED
Stop reason: SDUPTHER

## 2024-06-11 ASSESSMENT — LOCATION SIMPLE DESCRIPTION DERM
LOCATION SIMPLE: RIGHT BREAST
LOCATION SIMPLE: GROIN
LOCATION SIMPLE: LEFT BREAST

## 2024-06-11 ASSESSMENT — LOCATION DETAILED DESCRIPTION DERM
LOCATION DETAILED: RIGHT INGUINAL CREASE
LOCATION DETAILED: RIGHT INFRAMAMMARY CREASE (INNER QUADRANT)
LOCATION DETAILED: LEFT INGUINAL CREASE
LOCATION DETAILED: LEFT INFRAMAMMARY CREASE (INNER QUADRANT)

## 2024-06-11 ASSESSMENT — LOCATION ZONE DERM: LOCATION ZONE: TRUNK

## 2024-06-11 NOTE — PROCEDURE: DEFER
Introduction Text (Please End With A Colon): .
Reason To Defer Override: bx will be performed at her next visit.
X Size Of Lesion In Cm (Optional): 0
Size Of Lesion In Cm (Optional): 0.4
Detail Level: Detailed

## 2024-06-11 NOTE — PROCEDURE: ADDITIONAL NOTES
Detail Level: Simple
Additional Notes: Pt is going on vacation in 2 days.  She would prefer to defer this bx until after that.  Sched for July 2nd.
Render Risk Assessment In Note?: no
Additional Notes: Patient has been dealing with waxing and waning fungal intertrigo since Feb. \\nShe is flaring more in last few days. Notes oral medication is what works at this stage. \\nShe has been using econazole and miconazole powder without help. \\nShe did use the Dom[place] soln but notes it was exceptionally drying.

## 2024-07-02 ENCOUNTER — APPOINTMENT (RX ONLY)
Dept: URBAN - METROPOLITAN AREA CLINIC 22 | Facility: CLINIC | Age: 74
Setting detail: DERMATOLOGY
End: 2024-07-02

## 2024-07-02 DIAGNOSIS — L30.4 ERYTHEMA INTERTRIGO: ICD-10-CM

## 2024-07-02 PROBLEM — D48.5 NEOPLASM OF UNCERTAIN BEHAVIOR OF SKIN: Status: ACTIVE | Noted: 2024-07-02

## 2024-07-02 PROCEDURE — ? COUNSELING

## 2024-07-02 PROCEDURE — ? PRESCRIPTION

## 2024-07-02 PROCEDURE — 99213 OFFICE O/P EST LOW 20 MIN: CPT | Mod: 24,25

## 2024-07-02 PROCEDURE — ? ADDITIONAL NOTES

## 2024-07-02 PROCEDURE — 11102 TANGNTL BX SKIN SINGLE LES: CPT | Mod: 79

## 2024-07-02 PROCEDURE — ? BIOPSY BY SHAVE METHOD

## 2024-07-02 RX ORDER — NYSTATIN CREAM 100000 [USP'U]/G
CREAM TOPICAL BID
Qty: 30 | Refills: 3 | Status: ERX | COMMUNITY
Start: 2024-07-02

## 2024-07-02 RX ADMIN — NYSTATIN CREAM: 100000 CREAM TOPICAL at 00:00

## 2024-07-02 ASSESSMENT — LOCATION DETAILED DESCRIPTION DERM
LOCATION DETAILED: LEFT INFRAMAMMARY CREASE (INNER QUADRANT)
LOCATION DETAILED: RIGHT INFRAMAMMARY CREASE (INNER QUADRANT)

## 2024-07-02 ASSESSMENT — LOCATION ZONE DERM: LOCATION ZONE: TRUNK

## 2024-07-02 ASSESSMENT — LOCATION SIMPLE DESCRIPTION DERM
LOCATION SIMPLE: LEFT BREAST
LOCATION SIMPLE: RIGHT BREAST

## 2024-07-02 NOTE — PROCEDURE: BIOPSY BY SHAVE METHOD
Detail Level: Detailed
Depth Of Biopsy: dermis
Was A Bandage Applied: Yes
Size Of Lesion In Cm: 0.7
X Size Of Lesion In Cm: 0
Biopsy Type: H and E
Biopsy Method: Dermablade
Anesthesia Type: 0.05% lidocaine without epinephrine
Anesthesia Volume In Cc: 0.5
Hemostasis: Drysol
Wound Care: Petrolatum
Dressing: bandage
Destruction After The Procedure: No
Type Of Destruction Used: Curettage
Curettage Text: The wound bed was treated with curettage after the biopsy was performed.
Cryotherapy Text: The wound bed was treated with cryotherapy after the biopsy was performed.
Electrodesiccation Text: The wound bed was treated with electrodesiccation after the biopsy was performed.
Electrodesiccation And Curettage Text: The wound bed was treated with electrodesiccation and curettage after the biopsy was performed.
Silver Nitrate Text: The wound bed was treated with silver nitrate after the biopsy was performed.
Lab: [id number]
Lab Facility: 
Consent: Written consent was obtained and risks were reviewed including but not limited to scarring, infection, bleeding, scabbing, incomplete removal, nerve damage and allergy to anesthesia.
Post-Care Instructions: I reviewed with the patient in detail post-care instructions. Patient is to keep the biopsy site dry overnight, and then apply bacitracin twice daily until healed. Patient may apply hydrogen peroxide soaks to remove any crusting.
Notification Instructions: Patient will be notified of biopsy results. However, patient instructed to call the office if not contacted within 2 weeks.
Billing Type: Third-Party Bill
Information: Selecting Yes will display possible errors in your note based on the variables you have selected. This validation is only offered as a suggestion for you. PLEASE NOTE THAT THE VALIDATION TEXT WILL BE REMOVED WHEN YOU FINALIZE YOUR NOTE. IF YOU WANT TO FAX A PRELIMINARY NOTE YOU WILL NEED TO TOGGLE THIS TO 'NO' IF YOU DO NOT WANT IT IN YOUR FAXED NOTE.

## 2024-07-02 NOTE — PROCEDURE: ADDITIONAL NOTES
Detail Level: Simple
Additional Notes: 7/2/24:  \\nPt states she is better with her intertrigo however despite completing fluconazole course she is getting a few papules on left breast again.  She is using zeasorb powder.   She has been on several topicals.  Discussed trial of nystatin cream as additional coverage as she has not used this and ok to refill Fluconazole if needed.  She has also used domeboro solution in the past with variable result.   She will also be seeing primary derm provider in September for routine follow up \\n\\nPrior: Patient has been dealing with waxing and waning fungal intertrigo since Feb. \\nShe is flaring more in last few days. Notes oral medication is what works at this stage. \\nShe has been using econazole and miconazole powder without help. \\nShe did use the Dom[place] soln but notes it was exceptionally drying.
Render Risk Assessment In Note?: no

## 2024-07-25 ENCOUNTER — APPOINTMENT (RX ONLY)
Dept: URBAN - METROPOLITAN AREA CLINIC 22 | Facility: CLINIC | Age: 74
Setting detail: DERMATOLOGY
End: 2024-07-25

## 2024-07-25 DIAGNOSIS — D485 NEOPLASM OF UNCERTAIN BEHAVIOR OF SKIN: ICD-10-CM

## 2024-07-25 DIAGNOSIS — Z48.817 ENCOUNTER FOR SURGICAL AFTERCARE FOLLOWING SURGERY ON THE SKIN AND SUBCUTANEOUS TISSUE: ICD-10-CM

## 2024-07-25 PROBLEM — D48.5 NEOPLASM OF UNCERTAIN BEHAVIOR OF SKIN: Status: ACTIVE | Noted: 2024-07-25

## 2024-07-25 PROCEDURE — ? BIOPSY BY PUNCH METHOD

## 2024-07-25 PROCEDURE — 11104 PUNCH BX SKIN SINGLE LESION: CPT | Mod: 79

## 2024-07-25 PROCEDURE — ? POST-OP WOUND EVALUATION

## 2024-07-25 PROCEDURE — 99024 POSTOP FOLLOW-UP VISIT: CPT

## 2024-07-25 PROCEDURE — ? COUNSELING

## 2024-07-25 ASSESSMENT — LOCATION SIMPLE DESCRIPTION DERM
LOCATION SIMPLE: NOSE
LOCATION SIMPLE: LEFT LIP

## 2024-07-25 ASSESSMENT — LOCATION ZONE DERM
LOCATION ZONE: LIP
LOCATION ZONE: NOSE

## 2024-07-25 ASSESSMENT — LOCATION DETAILED DESCRIPTION DERM
LOCATION DETAILED: NASAL TIP
LOCATION DETAILED: LEFT UPPER CUTANEOUS LIP

## 2024-07-25 NOTE — PROCEDURE: POST-OP WOUND EVALUATION
Detail Level: Detailed
Add 08302 Cpt? (Important Note: In 2017 The Use Of 32486 Is Being Tracked By Cms To Determine Future Global Period Reimbursement For Global Periods): yes
Quality 355: Unplanned Reoperation Within The 30 Day Postoperative Period: No return to the operating room for a surgical procedure, for complications of the principal operative procedure, within 30 days of the principal operative procedure
Quality 357: Surgical Site Infection (Ssi): No surgical site infection
Wound Evaluated By (Optional): Trip Clay MD
Wound Diameter In Cm(Optional): 0
Wound Crusting?: clean
Wound Color?: pink

## 2024-07-25 NOTE — PROCEDURE: BIOPSY BY PUNCH METHOD
Detail Level: Detailed
Was A Bandage Applied: Yes
Punch Size In Mm: 2
Size Of Lesion In Cm (Optional): 0.7
X Size Of Lesion In Cm (Optional): 0.6
Depth Of Punch Biopsy: dermis
Biopsy Type: H and E
Anesthesia Type: 1% lidocaine with 1:100,000 epinephrine and a 1:10 solution of 8.4% sodium bicarbonate
Anesthesia Volume In Cc: 1
Additional Anesthesia Volume In Cc (Will Not Render If 0): 0
Hemostasis: Pressure
Epidermal Sutures: 4-0 Prolene
Wound Care: Petrolatum
Dressing: pressure dressing with telfa
Suture Removal: 8 days
Patient Will Remove Sutures At Home?: No
Lab: [id number]
Lab Facility: 
Consent: Written consent was obtained and risks were reviewed including but not limited to scarring, infection, bleeding, scabbing, incomplete removal, nerve damage and allergy to anesthesia.
Post-Care Instructions: I reviewed with the patient in detail post-care instructions. Patient is to keep the biopsy site dry overnight, and then apply bacitracin twice daily until healed. Patient may apply hydrogen peroxide soaks to remove any crusting.
Home Suture Removal Text: Patient was provided a home suture removal kit and will remove their sutures at home.  If they have any questions or difficulties they will call the office.
Notification Instructions: Patient will be notified of biopsy results. However, patient instructed to call the office if not contacted within 2 weeks.
Billing Type: Third-Party Bill
Information: Selecting Yes will display possible errors in your note based on the variables you have selected. This validation is only offered as a suggestion for you. PLEASE NOTE THAT THE VALIDATION TEXT WILL BE REMOVED WHEN YOU FINALIZE YOUR NOTE. IF YOU WANT TO FAX A PRELIMINARY NOTE YOU WILL NEED TO TOGGLE THIS TO 'NO' IF YOU DO NOT WANT IT IN YOUR FAXED NOTE.

## 2024-08-02 ENCOUNTER — APPOINTMENT (RX ONLY)
Dept: URBAN - METROPOLITAN AREA CLINIC 22 | Facility: CLINIC | Age: 74
Setting detail: DERMATOLOGY
End: 2024-08-02

## 2024-08-02 DIAGNOSIS — E03.9 ACQUIRED HYPOTHYROIDISM: ICD-10-CM

## 2024-08-02 DIAGNOSIS — Z48.817 ENCOUNTER FOR SURGICAL AFTERCARE FOLLOWING SURGERY ON THE SKIN AND SUBCUTANEOUS TISSUE: ICD-10-CM

## 2024-08-02 PROCEDURE — ? POST-OP WOUND EVALUATION

## 2024-08-02 PROCEDURE — 99024 POSTOP FOLLOW-UP VISIT: CPT

## 2024-08-02 ASSESSMENT — LOCATION ZONE DERM: LOCATION ZONE: NOSE

## 2024-08-02 ASSESSMENT — LOCATION SIMPLE DESCRIPTION DERM: LOCATION SIMPLE: NOSE

## 2024-08-02 ASSESSMENT — LOCATION DETAILED DESCRIPTION DERM: LOCATION DETAILED: NASAL TIP

## 2024-08-02 NOTE — PROCEDURE: POST-OP WOUND EVALUATION
Detail Level: Detailed
Add 31976 Cpt? (Important Note: In 2017 The Use Of 12006 Is Being Tracked By Cms To Determine Future Global Period Reimbursement For Global Periods): yes
Quality 355: Unplanned Reoperation Within The 30 Day Postoperative Period: No return to the operating room for a surgical procedure, for complications of the principal operative procedure, within 30 days of the principal operative procedure
Quality 357: Surgical Site Infection (Ssi): No surgical site infection
Wound Evaluated By (Optional): Trip Clay MD
Wound Diameter In Cm(Optional): 0
Wound Crusting?: clean
Sutures?: intact
Wound Color?: pink
Patient To Follow-Up With?: their primary dermatologist

## 2024-08-05 RX ORDER — LEVOTHYROXINE SODIUM 50 UG/1
50 TABLET ORAL
Qty: 90 TABLET | Refills: 3 | Status: SHIPPED | OUTPATIENT
Start: 2024-08-05

## 2024-08-05 NOTE — TELEPHONE ENCOUNTER
Received request via: Pharmacy    Was the patient seen in the last year in this department? Yes    Does the patient have an active prescription (recently filled or refills available) for medication(s) requested? No    Pharmacy Name: [facility] [address] [place] [address], [place] [address] [facility]     Does the patient have halfway Plus and need 100 day supply (blood pressure, diabetes and cholesterol meds only)? Medication is not for cholesterol, blood pressure or diabetes

## 2024-10-14 ENCOUNTER — APPOINTMENT (OUTPATIENT)
Dept: MEDICAL GROUP | Facility: PHYSICIAN GROUP | Age: 74
End: 2024-10-14
Payer: MEDICARE

## 2024-10-14 VITALS
DIASTOLIC BLOOD PRESSURE: 70 MMHG | HEIGHT: 64 IN | OXYGEN SATURATION: 97 % | BODY MASS INDEX: 35.24 KG/M2 | HEART RATE: 70 BPM | TEMPERATURE: 97.7 F | WEIGHT: 206.4 LBS | RESPIRATION RATE: 16 BRPM | SYSTOLIC BLOOD PRESSURE: 118 MMHG

## 2024-10-14 DIAGNOSIS — Z23 NEED FOR VACCINATION: ICD-10-CM

## 2024-10-14 DIAGNOSIS — E66.01 MORBID (SEVERE) OBESITY DUE TO EXCESS CALORIES (HCC): ICD-10-CM

## 2024-10-14 DIAGNOSIS — Z98.890 HISTORY OF BASAL CELL CARCINOMA (BCC) EXCISION: ICD-10-CM

## 2024-10-14 DIAGNOSIS — Z85.828 HISTORY OF BASAL CELL CARCINOMA (BCC) EXCISION: ICD-10-CM

## 2024-10-14 DIAGNOSIS — M85.88 OSTEOPENIA OF LUMBAR SPINE: ICD-10-CM

## 2024-10-14 DIAGNOSIS — M19.90 ARTHRITIS: ICD-10-CM

## 2024-10-14 PROCEDURE — G0008 ADMIN INFLUENZA VIRUS VAC: HCPCS | Performed by: FAMILY MEDICINE

## 2024-10-14 PROCEDURE — 3078F DIAST BP <80 MM HG: CPT | Performed by: FAMILY MEDICINE

## 2024-10-14 PROCEDURE — 99214 OFFICE O/P EST MOD 30 MIN: CPT | Mod: 25 | Performed by: FAMILY MEDICINE

## 2024-10-14 PROCEDURE — 3074F SYST BP LT 130 MM HG: CPT | Performed by: FAMILY MEDICINE

## 2024-10-14 PROCEDURE — 90662 IIV NO PRSV INCREASED AG IM: CPT | Performed by: FAMILY MEDICINE

## 2024-10-14 ASSESSMENT — FIBROSIS 4 INDEX: FIB4 SCORE: 1.94

## 2024-11-19 ENCOUNTER — APPOINTMENT (RX ONLY)
Dept: URBAN - METROPOLITAN AREA CLINIC 22 | Facility: CLINIC | Age: 74
Setting detail: DERMATOLOGY
End: 2024-11-19

## 2024-11-19 DIAGNOSIS — L82.1 OTHER SEBORRHEIC KERATOSIS: ICD-10-CM

## 2024-11-19 DIAGNOSIS — Z71.89 OTHER SPECIFIED COUNSELING: ICD-10-CM

## 2024-11-19 DIAGNOSIS — L81.4 OTHER MELANIN HYPERPIGMENTATION: ICD-10-CM

## 2024-11-19 DIAGNOSIS — Z85.828 PERSONAL HISTORY OF OTHER MALIGNANT NEOPLASM OF SKIN: ICD-10-CM

## 2024-11-19 DIAGNOSIS — D22 MELANOCYTIC NEVI: ICD-10-CM

## 2024-11-19 DIAGNOSIS — D18.0 HEMANGIOMA: ICD-10-CM

## 2024-11-19 DIAGNOSIS — L57.0 ACTINIC KERATOSIS: ICD-10-CM

## 2024-11-19 PROBLEM — D22.5 MELANOCYTIC NEVI OF TRUNK: Status: ACTIVE | Noted: 2024-11-19

## 2024-11-19 PROBLEM — D18.01 HEMANGIOMA OF SKIN AND SUBCUTANEOUS TISSUE: Status: ACTIVE | Noted: 2024-11-19

## 2024-11-19 PROBLEM — D23.72 OTHER BENIGN NEOPLASM OF SKIN OF LEFT LOWER LIMB, INCLUDING HIP: Status: ACTIVE | Noted: 2024-11-19

## 2024-11-19 PROCEDURE — 99213 OFFICE O/P EST LOW 20 MIN: CPT

## 2024-11-19 PROCEDURE — ? COUNSELING

## 2024-11-19 PROCEDURE — ? SUNSCREEN RECOMMENDATIONS

## 2024-11-19 PROCEDURE — ? DEFER

## 2024-11-19 ASSESSMENT — LOCATION SIMPLE DESCRIPTION DERM
LOCATION SIMPLE: RIGHT EYEBROW
LOCATION SIMPLE: LEFT FOREARM
LOCATION SIMPLE: LEFT UPPER BACK
LOCATION SIMPLE: ABDOMEN
LOCATION SIMPLE: RIGHT BACK
LOCATION SIMPLE: RIGHT UPPER BACK
LOCATION SIMPLE: LEFT LIP
LOCATION SIMPLE: RIGHT PRETIBIAL REGION

## 2024-11-19 ASSESSMENT — LOCATION DETAILED DESCRIPTION DERM
LOCATION DETAILED: LEFT PROXIMAL DORSAL FOREARM
LOCATION DETAILED: SUBXIPHOID
LOCATION DETAILED: RIGHT CENTRAL EYEBROW
LOCATION DETAILED: LEFT UPPER CUTANEOUS LIP
LOCATION DETAILED: RIGHT SUPERIOR LATERAL UPPER BACK
LOCATION DETAILED: LEFT INFERIOR UPPER BACK
LOCATION DETAILED: RIGHT MID-UPPER BACK
LOCATION DETAILED: RIGHT DISTAL PRETIBIAL REGION
LOCATION DETAILED: LEFT LATERAL ABDOMEN

## 2024-11-19 ASSESSMENT — LOCATION ZONE DERM
LOCATION ZONE: TRUNK
LOCATION ZONE: FACE
LOCATION ZONE: LEG
LOCATION ZONE: LIP
LOCATION ZONE: ARM

## 2024-11-19 NOTE — PROCEDURE: DEFER
Detail Level: Detailed
Size Of Lesion In Cm (Optional): 0
Introduction Text (Please End With A Colon): .
Procedure To Be Performed At Next Visit: Cryotherapy

## 2024-12-04 ENCOUNTER — OFFICE VISIT (OUTPATIENT)
Dept: MEDICAL GROUP | Facility: PHYSICIAN GROUP | Age: 74
End: 2024-12-04
Payer: MEDICARE

## 2024-12-04 ENCOUNTER — HOSPITAL ENCOUNTER (OUTPATIENT)
Dept: RADIOLOGY | Facility: MEDICAL CENTER | Age: 74
End: 2024-12-04
Attending: FAMILY MEDICINE
Payer: MEDICARE

## 2024-12-04 VITALS
BODY MASS INDEX: 34.83 KG/M2 | WEIGHT: 204 LBS | RESPIRATION RATE: 16 BRPM | HEIGHT: 64 IN | TEMPERATURE: 98.7 F | SYSTOLIC BLOOD PRESSURE: 120 MMHG | DIASTOLIC BLOOD PRESSURE: 62 MMHG | OXYGEN SATURATION: 99 % | HEART RATE: 70 BPM

## 2024-12-04 DIAGNOSIS — M79.645 PAIN OF LEFT THUMB: ICD-10-CM

## 2024-12-04 DIAGNOSIS — E78.2 MIXED HYPERLIPIDEMIA: ICD-10-CM

## 2024-12-04 PROCEDURE — 99213 OFFICE O/P EST LOW 20 MIN: CPT | Performed by: FAMILY MEDICINE

## 2024-12-04 PROCEDURE — 3078F DIAST BP <80 MM HG: CPT | Performed by: FAMILY MEDICINE

## 2024-12-04 PROCEDURE — 3074F SYST BP LT 130 MM HG: CPT | Performed by: FAMILY MEDICINE

## 2024-12-04 PROCEDURE — 73140 X-RAY EXAM OF FINGER(S): CPT | Mod: LT

## 2024-12-04 ASSESSMENT — FIBROSIS 4 INDEX: FIB4 SCORE: 1.94

## 2024-12-04 NOTE — PROGRESS NOTES
Subjective:     CC: Here for new onset of left thumb pain.    HPI:   Deneen presents today with the following medical concerns:    Pain of left thumb  This is a new problem.  Patient awoke with pain in the base of her left thumb about 12 days ago.  Certain movements cause a severe lancing pain that seems to come from the base of her thumb.  No injury.  She is tried various topical ointments and regimens without success.  No redness or swelling.  She did have pain in the left thumb weight back in 2016 and an x-ray at that time showed some mild degenerative changes.  She is currently using a thumb brace to immobilize it.    Past Medical History:   Diagnosis Date    Arthritis     osteo - fingers, knees, back    Disorder of thyroid     hypoactive    Family history of diabetes mellitus 02/17/2015    High cholesterol     Hyperlipidemia     Morbid obesity (HCC) 06/15/2022    Pain 04/22/2016    low back-chronic    Pain 01/27/2017    right knee    Personal history of colonic polyps 02/12/2020    Goes to [facility].    Screening mammogram for high-risk patient 10/07/2019    Status post right knee replacement 01/30/2017       Social History     Tobacco Use    Smoking status: Never     Passive exposure: Never    Smokeless tobacco: Never   Vaping Use    Vaping status: Never Used   Substance Use Topics    Alcohol use: No    Drug use: No       Current Outpatient Medications Ordered in Epic   Medication Sig Dispense Refill    levothyroxine (SYNTHROID) 50 MCG Tab TAKE 1 TABLET BY MOUTH ONCE DAILY IN THE MORNING ON AN EMPTY STOMACH 90 Tablet 3    econazole nitrate 1 % cream APPLY TOPICALLY TO AFFECTED AREA(S) 2 TIMES A DAY 30 g 0    fluconazole (DIFLUCAN) 200 MG Tab Take 200 mg by mouth every day. 4 weeks once a weel unsure the strength      celecoxib (CELEBREX) 200 MG Cap Take 1 Capsule by mouth 2 times a day. 60 Capsule 1    rosuvastatin (CRESTOR) 20 MG Tab Take 1 Tablet by mouth every evening. 100 Tablet 3    MAGNESIUM PO  "magnesium      POTASSIUM PO potassium      ketoconazole (NIZORAL) 2 % Cream Apply 1 Squirt topically 2 times a day. Use on right external ear canal 15 g 3    HYDROcodone/acetaminophen (NORCO)  MG Tab Take 1 Tablet by mouth every 6 hours as needed.      methocarbamol (ROBAXIN) 500 MG Tab Take 1 Tablet by mouth 4 times a day as needed (back spasm). 90 Tablet 3    nystatin (MYCOSTATIN) powder APPLY TO AFFECTED AREA 3 TIMES A DAY FOR 7 DAYS 60 g 6    triamcinolone (KENALOG) 0.1 % lotion Apply to scalp after shampooing with Selsun Blue shampoo 60 mL 5    aspirin 81 MG EC tablet Take 1 Tab by mouth 2 times a day, with meals. 90 Tab 0    vitamin D3, cholecalciferol, 1000 UNIT Tab Take 1 Tab by mouth every day. 100 Tab 3    ascorbic acid (ASCORBIC ACID) 500 MG Tab Take 1,000 mg by mouth every day.      BETA CAROTENE PO Take 10,000 Int'l Units by mouth every day.      B Complex-C-Folic Acid (STRESS B COMPLEX PO) Take  by mouth 2 Times a Day.      Calcium 1500 MG TABS Take  by mouth every day.       No current [facility]-ordered facility-administered medications on file.       Allergies:  Meloxicam and Silicone    Health Maintenance: Completed    ROS:  Gen: no fevers/chills, no changes in weight  Eyes: no changes in vision  ENT: no sore throat, no hearing loss, no bloody nose  Pulm: no sob, no cough  CV: no chest pain, no palpitations  GI: no nausea/vomiting, no diarrhea  : no dysuria  MSk: no myalgias  Skin: no rash  Neuro: no headaches, no numbness/tingling  Heme/Lymph: no easy bruising      Objective:       Exam:  /62 (BP Location: Right arm, Patient Position: Sitting, BP Cuff Size: Adult)   Pulse 70   Temp 37.1 °C (98.7 °F) (Temporal)   Resp 16   Ht 1.626 m (5' 4\")   Wt 92.5 kg (204 lb)   SpO2 99%   BMI 35.02 kg/m²  Body mass index is 35.02 kg/m².    Gen: Alert and oriented, No apparent distress.  Lungs: Normal effort,   Ext: No clubbing, cyanosis, edema.  There is no redness or swelling to the left thumb " or hand.  She points to the palmar aspect of the thenar eminence is where the pain seems to originate.  Neurovascular appears intact.        Assessment & Plan:     74 y.o. female with the following -     1. Pain of left thumb  This is a new problem.  Patient sent for an x-ray.  If it only shows arthritis and we could try her on a Medrol Dosepak.  Will let her know once we get the result.  - DX-FINGER(S) 2+ LEFT; Future      Return if symptoms worsen or fail to improve.    Please note that this dictation was created using voice recognition software. I have made every reasonable attempt to correct obvious errors, but I expect that there are errors of grammar and possibly content that I did not discover before finalizing the note.

## 2024-12-04 NOTE — ASSESSMENT & PLAN NOTE
This is a new problem.  Patient awoke with pain in the base of her left thumb about 12 days ago.  Certain movements cause a severe lancing pain that seems to come from the base of her thumb.  No injury.  She is tried various topical ointments and regimens without success.  No redness or swelling.  She did have pain in the left thumb weight back in 2016 and an x-ray at that time showed some mild degenerative changes.  She is currently using a thumb brace to immobilize it.

## 2024-12-05 RX ORDER — METHYLPREDNISOLONE 4 MG/1
TABLET ORAL
Qty: 21 TABLET | Refills: 0 | Status: SHIPPED | OUTPATIENT
Start: 2024-12-05

## 2024-12-05 RX ORDER — ROSUVASTATIN CALCIUM 20 MG/1
20 TABLET, COATED ORAL EVERY EVENING
Qty: 100 TABLET | Refills: 3 | Status: SHIPPED | OUTPATIENT
Start: 2024-12-05

## 2024-12-05 NOTE — TELEPHONE ENCOUNTER
Received request via: Pharmacy    Was the patient seen in the last year in this department? Yes    Does the patient have an active prescription (recently filled or refills available) for medication(s) requested? No    Pharmacy Name: [facility]     Does the patient have prison Plus and need 100-day supply? (This applies to ALL medications) Yes, quantity updated to 100 days

## 2025-03-03 ENCOUNTER — APPOINTMENT (OUTPATIENT)
Dept: FAMILY PLANNING/WOMEN'S HEALTH CLINIC | Facility: PHYSICIAN GROUP | Age: 75
End: 2025-03-03
Payer: MEDICARE

## 2025-03-03 VITALS
DIASTOLIC BLOOD PRESSURE: 70 MMHG | BODY MASS INDEX: 33.97 KG/M2 | RESPIRATION RATE: 16 BRPM | WEIGHT: 199 LBS | HEART RATE: 74 BPM | SYSTOLIC BLOOD PRESSURE: 116 MMHG | HEIGHT: 64 IN | OXYGEN SATURATION: 98 %

## 2025-03-03 DIAGNOSIS — E78.2 MIXED HYPERLIPIDEMIA: ICD-10-CM

## 2025-03-03 DIAGNOSIS — N18.31 CHRONIC RENAL FAILURE, STAGE 3A: ICD-10-CM

## 2025-03-03 DIAGNOSIS — E66.01 MORBID (SEVERE) OBESITY DUE TO EXCESS CALORIES (HCC): ICD-10-CM

## 2025-03-03 DIAGNOSIS — E66.9 OBESITY (BMI 30-39.9): ICD-10-CM

## 2025-03-03 DIAGNOSIS — E03.8 OTHER SPECIFIED HYPOTHYROIDISM: ICD-10-CM

## 2025-03-03 DIAGNOSIS — M85.88 OSTEOPENIA OF LUMBAR SPINE: ICD-10-CM

## 2025-03-03 PROCEDURE — 3078F DIAST BP <80 MM HG: CPT | Performed by: NURSE PRACTITIONER

## 2025-03-03 PROCEDURE — 1125F AMNT PAIN NOTED PAIN PRSNT: CPT | Performed by: NURSE PRACTITIONER

## 2025-03-03 PROCEDURE — G0439 PPPS, SUBSEQ VISIT: HCPCS | Performed by: NURSE PRACTITIONER

## 2025-03-03 PROCEDURE — 3074F SYST BP LT 130 MM HG: CPT | Performed by: NURSE PRACTITIONER

## 2025-03-03 SDOH — HEALTH STABILITY: PHYSICAL HEALTH: ON AVERAGE, HOW MANY MINUTES DO YOU ENGAGE IN EXERCISE AT THIS LEVEL?: 0 MIN

## 2025-03-03 SDOH — ECONOMIC STABILITY: FOOD INSECURITY: WITHIN THE PAST 12 MONTHS, THE FOOD YOU BOUGHT JUST DIDN'T LAST AND YOU DIDN'T HAVE MONEY TO GET MORE.: NEVER TRUE

## 2025-03-03 SDOH — ECONOMIC STABILITY: TRANSPORTATION INSECURITY: IN THE PAST 12 MONTHS, HAS LACK OF TRANSPORTATION KEPT YOU FROM MEDICAL APPOINTMENTS OR FROM GETTING MEDICATIONS?: NO

## 2025-03-03 SDOH — ECONOMIC STABILITY: HOUSING INSECURITY: AT ANY TIME IN THE PAST 12 MONTHS, WERE YOU HOMELESS OR LIVING IN A SHELTER (INCLUDING NOW)?: NO

## 2025-03-03 SDOH — HEALTH STABILITY: PHYSICAL HEALTH: ON AVERAGE, HOW MANY DAYS PER WEEK DO YOU ENGAGE IN MODERATE TO STRENUOUS EXERCISE (LIKE A BRISK WALK)?: 0 DAYS

## 2025-03-03 SDOH — ECONOMIC STABILITY: FOOD INSECURITY: WITHIN THE PAST 12 MONTHS, YOU WORRIED THAT YOUR FOOD WOULD RUN OUT BEFORE YOU GOT THE MONEY TO BUY MORE.: NEVER TRUE

## 2025-03-03 SDOH — ECONOMIC STABILITY: HOUSING INSECURITY: IN THE LAST 12 MONTHS, WAS THERE A TIME WHEN YOU WERE NOT ABLE TO PAY THE MORTGAGE OR RENT ON TIME?: NO

## 2025-03-03 SDOH — ECONOMIC STABILITY: FOOD INSECURITY: HOW HARD IS IT FOR YOU TO PAY FOR THE VERY BASICS LIKE FOOD, HOUSING, MEDICAL CARE, AND HEATING?: NOT HARD AT ALL

## 2025-03-03 ASSESSMENT — PAIN SCALES - GENERAL: PAINLEVEL_OUTOF10: 1=MINIMAL PAIN

## 2025-03-03 ASSESSMENT — FIBROSIS 4 INDEX: FIB4 SCORE: 1.94

## 2025-03-03 ASSESSMENT — ACTIVITIES OF DAILY LIVING (ADL)
LACK_OF_TRANSPORTATION: NO
BATHING_REQUIRES_ASSISTANCE: 0

## 2025-03-03 ASSESSMENT — ENCOUNTER SYMPTOMS: GENERAL WELL-BEING: GOOD

## 2025-03-03 ASSESSMENT — PATIENT HEALTH QUESTIONNAIRE - PHQ9: CLINICAL INTERPRETATION OF PHQ2 SCORE: 0

## 2025-03-03 NOTE — PROGRESS NOTES
Comprehensive Health Assessment Program     Deneen Temple is a 74 y.o. here for her comprehensive health assessment.    Patient Active Problem List    Diagnosis Date Noted    Pain of left thumb 12/04/2024    Obesity 10/14/2024    Left knee pain 04/01/2024    History of basal cell carcinoma (BCC) excision 04/01/2024    Adult general medical exam 04/01/2024    Postmenopausal estrogen deficiency 04/01/2024    Medication management 02/12/2024    Lack of motivation 10/11/2023    DDD (degenerative disc disease), lumbar 04/21/2023    Mild carpal tunnel syndrome of right wrist 12/07/2022    Os trigonum 11/04/2021    Ankle instability, left 11/04/2021    Arthritis     Other specified hypothyroidism 06/23/2020    Seasonal allergies 06/23/2020    Chronic left-sided low back pain with left-sided sciatica 07/08/2019    Seborrheic dermatitis of scalp 04/04/2018    Vitamin D deficiency 03/31/2017    Obesity (BMI 30-39.9) 03/28/2017    Degenerative arthritis of lumbar spine 02/17/2015    Osteopenia of lumbar spine 02/17/2015    Mixed hyperlipidemia 02/17/2015       Current Outpatient Medications   Medication Sig Dispense Refill    rosuvastatin (CRESTOR) 20 MG Tab TAKE 1 TABLET BY MOUTH ONCE DAILY IN THE EVENING 100 Tablet 3    levothyroxine (SYNTHROID) 50 MCG Tab TAKE 1 TABLET BY MOUTH ONCE DAILY IN THE MORNING ON AN EMPTY STOMACH 90 Tablet 3    econazole nitrate 1 % cream APPLY TOPICALLY TO AFFECTED AREA(S) 2 TIMES A DAY 30 g 0    MAGNESIUM PO magnesium      POTASSIUM PO potassium      ketoconazole (NIZORAL) 2 % Cream Apply 1 Squirt topically 2 times a day. Use on right external ear canal 15 g 3    HYDROcodone/acetaminophen (NORCO)  MG Tab Take 1 Tablet by mouth every 6 hours as needed.      methocarbamol (ROBAXIN) 500 MG Tab Take 1 Tablet by mouth 4 times a day as needed (back spasm). 90 Tablet 3    nystatin (MYCOSTATIN) powder APPLY TO AFFECTED AREA 3 TIMES A DAY FOR 7 DAYS 60 g 6    triamcinolone (KENALOG)  0.1 % lotion Apply to scalp after shampooing with Selsun Blue shampoo 60 mL 5    aspirin 81 MG EC tablet Take 1 Tab by mouth 2 times a day, with meals. 90 Tab 0    vitamin D3, cholecalciferol, 1000 UNIT Tab Take 1 Tab by mouth every day. 100 Tab 3    ascorbic acid (ASCORBIC ACID) 500 MG Tab Take 1,000 mg by mouth every day.      BETA CAROTENE PO Take 10,000 Int'l Units by mouth every day.      B Complex-C-Folic Acid (STRESS B COMPLEX PO) Take  by mouth 2 Times a Day.      Calcium 1500 MG TABS Take  by mouth every day.      methylPREDNISolone (MEDROL DOSEPAK) 4 MG Tablet Therapy Pack As directed on the packaging label. (Patient not taking: Reported on 3/3/2025) 21 Tablet 0    fluconazole (DIFLUCAN) 200 MG Tab Take 200 mg by mouth every day. 4 weeks once a weel unsure the strength (Patient not taking: Reported on 3/3/2025)      celecoxib (CELEBREX) 200 MG Cap Take 1 Capsule by mouth 2 times a day. (Patient not taking: Reported on 3/3/2025) 60 Capsule 1     No current facility-administered medications for this visit.          Current supplements as per medication list.     Allergies:   Meloxicam and Silicone  Social History     Tobacco Use    Smoking status: Never     Passive exposure: Never    Smokeless tobacco: Never   Vaping Use    Vaping status: Never Used   Substance Use Topics    Alcohol use: No    Drug use: No     Family History   Problem Relation Age of Onset    Stroke Father     Diabetes Father     Asthma Mother     Heart Attack Mother 55    Obesity Mother     No Known Problems Brother     No Known Problems Maternal Grandmother     Cancer Maternal Grandfather     No Known Problems Paternal Grandmother     No Known Problems Paternal Grandfather     Cancer Brother         throat cancer    Diabetes Son         type 1     Deneen  has a past medical history of Arthritis, Disorder of thyroid, Family history of diabetes mellitus (02/17/2015), High cholesterol, Hyperlipidemia, Morbid obesity (HCC) (06/15/2022), Pain  (04/22/2016), Pain (01/27/2017), Personal history of colonic polyps (02/12/2020), Screening mammogram for high-risk patient (10/07/2019), and Status post right knee replacement (01/30/2017).    She has no past medical history of Sickle cell disease (HCC).   Past Surgical History:   Procedure Laterality Date    PB TOTAL KNEE ARTHROPLASTY Right 12/16/2020    Procedure: ARTHROPLASTY, KNEE, TOTAL;  Surgeon: Hugh Grover M.D.;  Location: SURGERY [facility];  Service: Orthopedics    KNEE ARTHROSCOPY Right 1/30/2017    Procedure: KNEE ARTHROSCOPY;  Surgeon: Will Mattson M.D.;  Location: [facility];  Service:     MENISCECTOMY, KNEE, MEDIAL  1/30/2017    Procedure: MEDIAL MENISCECTOMY - PARTIAL;  Surgeon: Will Mattson M.D.;  Location: [facility];  Service:     TRIGGER FINGER RELEASE Left 4/27/2016    Procedure: TRIGGER FINGER RELEASE - THUMB;  Surgeon: Bhavin Barker M.D.;  Location: [facility];  Service:     DEQUERVAINS RELEASE Right 3/2015    wrist    COLONOSCOPY  2009    SHOULDER SURGERY Left 2008    removal of bone spur left shoulder       Screening:  In the last six months have you experienced any leakage of urine? No    Depression Screening  Little interest or pleasure in doing things?  0 - not at all  Feeling down, depressed , or hopeless? 0 - not at all  Patient Health Questionnaire Score: 0     If depressive symptoms identified deferred to follow up visit unless specifically addressed in assessment and plan.    Interpretation of PHQ-9 Total Score   Score Severity   1-4 No Depression   5-9 Mild Depression   10-14 Moderate Depression   15-19 Moderately Severe Depression   20-27 Severe Depression    Screening for Cognitive Impairment  Do you or any of your friends or family members have any concern about your memory? No  Three Minute Recall (Village, Kitchen, Baby) 2/3    Jaylan clock face with all 12 numbers and set the hands to show 10 minutes past 11.   Yes 5  Cognitive concerns identified deferred for follow up unless specifically addressed in assessment and plan.    Fall Risk Assessment  Has the patient had two or more falls in the last year or any fall with injury in the last year?  No    Safety Assessment  Do you always wear your seatbelt?  Yes  Any changes to home needed to function safely? No  Difficulty hearing.  No  Patient counseled about all safety risks that were identified.    Functional Assessment ADLs  Are there any barriers preventing you from cooking for yourself or meeting nutritional needs?  No.    Are there any barriers preventing you from driving safely or obtaining transportation?  No.    Are there any barriers preventing you from using a telephone or calling for help?  No    Are there any barriers preventing you from shopping?  No.    Are there any barriers preventing you from taking care of your own finances?  No    Are there any barriers preventing you from managing your medications?  No    Are there any barriers preventing you from showering, bathing or dressing yourself? No    Are there any barriers preventing you from doing housework or laundry? No  Are there any barriers preventing you from using the toilet?No  Are you currently engaging in any exercise or physical activity?  No.      Self-Assessment of Health  What is your perception of your health? Good    Do you sleep more than six hours a night? Yes    In the past 7 days, how much did pain keep you from doing your normal work? Some    Do you spend quality time with family or friends (virtually or in person)? Yes    Do you usually eat a heart healthy diet that constists of a variety of fruits, vegetables, whole grains and fiber? Yes    Do you eat foods high in fat and/or Fast Food more than three times per week? No    How concerned are you that your medical conditions are not being well managed? Not at all    Are you worried that in the next 2 months, you may not have stable housing  that you own, rent, or stay in as part of a household? No      Advance Care Planning  Do you have an Advance Directive, Living Will, Durable Power of , or POLST? Yes                 Health Maintenance Summary            Current Care Gaps       Mammogram (Yearly) Overdue since 10/20/2024      10/20/2023  MA-SCREENING MAMMO BILAT W/TOMOSYNTHESIS W/CAD    2022  MA-SCREENING MAMMO BILAT W/TOMOSYNTHESIS W/CAD    2021  MA-SCREENING MAMMO BILAT W/TOMOSYNTHESIS W/CAD    2019  MA-SCREENING MAMMO BILAT W/TOMOSYNTHESIS W/CAD    2015  MA-SCREEN MAMMO W/CAD-BILAT     Only the first 5 history entries have been loaded, but more history exists.            Colorectal Cancer Screening (Colonoscopy - Every 5 Years) Overdue since 2020  REFERRAL TO GI FOR COLONOSCOPY    2019  OCCULT BLOOD FECES IMMUNOASSAY    2009  REFERRAL TO GI FOR COLONOSCOPY                      Needs Review       Hepatitis C Screening  Tentatively Complete      2020  Hepatitis C Antibody component of HCV Scrn ( [id number] 1xLife)                      Upcoming       COVID-19 Vaccine (2024- season) Postponed until 2025      No completion history exists for this topic.              Bone Density Scan (Every 2 Years) Next due on 2024  DS-BONE DENSITY STUDY (DEXA)    2021  DS-BONE DENSITY STUDY (DEXA)    2018  DS-BONE DENSITY STUDY (DEXA)              IMM DTaP/Tdap/Td Vaccine (5 - Td or Tdap) Next due on 2030  Imm Admin: Tdap Vaccine    2009  Imm Admin: Tdap Vaccine    2007  Imm Admin: TD Vaccine    2007  Imm Admin: Tdap Vaccine                      Completed or No Longer Recommended       Hepatitis B Vaccine (Hep B) (Series Information) Completed      2004  Imm Admin: Hepatitis B Vaccine (Adol/Adult)    2004  Imm Admin: Hepatitis B Vaccine (Adol/Adult)    2004  Imm Admin: Hepatitis B Vaccine  (Adol/Adult)              Influenza Vaccine (Series Information) Completed      10/14/2024  Imm Admin: Influenza high-dose trivalent (PF)    10/11/2023  Imm Admin: Influenza Vaccine Adult HD    10/21/2022  Imm Admin: Influenza Vaccine Adult HD    09/28/2021  Imm Admin: Influenza Vaccine Adult HD    11/16/2020  Imm Admin: Influenza Vaccine Adult HD     Only the first 5 history entries have been loaded, but more history exists.            Zoster (Shingles) Vaccines (Series Information) Completed      11/16/2020  Imm Admin: Zoster Vaccine Recombinant (RZV) (SHINGRIX)    02/26/2020  Imm Admin: Zoster Vaccine Recombinant (RZV) (SHINGRIX)    03/27/2012  Imm Admin: Zoster Vaccine Live (ZVL) (Zostavax) - HISTORICAL DATA              Pneumococcal Vaccine: 50+ Years (Series Information) Completed      10/04/2017  Imm Admin: Pneumococcal polysaccharide vaccine (PPSV-23)    03/31/2016  Imm Admin: Pneumococcal Conjugate Vaccine (Prevnar/PCV-13)              Hepatitis A Vaccine (Hep A) (Series Information) Aged Out      09/08/2004  Imm Admin: Hepatitis A Vaccine, Adult              HPV Vaccines (Series Information) Aged Out      No completion history exists for this topic.              Polio Vaccine (Inactivated Polio) (Series Information) Aged Out     No completion history exists for this topic.              Meningococcal Immunization (Series Information) Aged Out     No completion history exists for this topic.              Cervical Cancer Screening  Discontinued        Frequency changed to Never automatically (Topic No Longer Applies)    09/03/2015  PAP IG, HPV-HR    09/03/2015  Done              Urine Drug Screening  Discontinued      12/07/2022  PAIN MANAGEMENT PANEL, SCRN W/ RFLX TO QNT              Annual Wellness Visit  Discontinued      03/03/2025  Level of Service: [place] ANNUAL WELLNESS VISIT-INCLUDES PPPS SUBSEQUE*    02/12/2024  Level of Service: [place] ANNUAL WELLNESS VISIT-INCLUDES PPPS SUBSEQUE*    04/21/2023  Level of  "Service: [place] ANNUAL WELLNESS VISIT-INCLUDES PPPS SUBSEQUE*    06/15/2022  Level of Service: CT ANNUAL WELLNESS VISIT-INCLUDES PPPS SUBSEQUE*    08/12/2021  Level of Service: CT ANNUAL WELLNESS VISIT-INCLUDES PPPS SUBSEQUE*     Only the first 5 history entries have been loaded, but more history exists.                          Patient Care Team:  Adria Reid III, M.D. as PCP - General (Family Medicine)  Hugh Grover M.D. as Consulting Physician (Orthopedic Surgery)  Padmini Post O.D. as Consulting Physician (Optometry)  [facility] as Consulting Physician (Physical Therapy)  MARCE Diaz as Consulting Physician (Family Medicine)  Cecilio Hurley Ass't as [facility]       Financial Resource Strain: Low Risk  (3/3/2025)    Overall Financial Resource Strain (CARDIA)     Difficulty of Paying Living Expenses: Not hard at all      Transportation Needs: No Transportation Needs (3/3/2025)    PRAPARE - Transportation     Lack of Transportation (Medical): No     Lack of Transportation (Non-Medical): No      Food Insecurity: No Food Insecurity (3/3/2025)    Hunger Vital Sign     Worried About Running Out of Food in the Last Year: Never true     Ran Out of Food in the Last Year: Never true        Encounter Vitals  Temperature:  (Unavailable)  Blood Pressure : 116/70  Pulse: 74  Respiration: 16  Pulse Oximetry: 98 %  Weight: 90.3 kg (199 lb)  Height: 162.6 cm (5' 4\")  BMI (Calculated): 34.16  Pain Score: 1=Minimal Pain     ROS:  No fever, chills, nausea, vomiting, diarrhea, chest pain or shortness of breath. See HPI.    Physical Exam:  Constitutional: NAD  HENMT: NC/AT,OP clear, TM's clear, no lymphadenopathy, no thyromegaly.  No JVD.  ( - ) bilat carotid bruit   Cardiovascular: RRR, No murmur  Lungs: CTAB bilat   Extremities    No Edema   Skin: No legions notes  Neurologic: Alert & oriented     Assessment and Plan. The following treatment and monitoring plan is " recommended:  Obesity (BMI 30-39.9)  Stable. BMI 34.16. continue heart healthy diet and regular exercise. Cont f/u with PCP for ongoing monitoring and discussion    Mixed hyperlipidemia  Stable on rosuvastatin Records review    Latest Reference Range & Units 04/01/24 09:23   Cholesterol,Tot 100 - 199 mg/dL 136   Triglycerides 0 - 149 mg/dL 107   HDL >=40 mg/dL 59   LDL <100 mg/dL 56      Cont heart healthy diet and regular exercise. Cont f/u with PCP for ongoing monitoring and medication management      Osteopenia of lumbar spine  Stable. Records review BMD (4/29/24) reports T - 1.5/ Z -0.6 ( L spine) ; T -0.8/ Z 0.3 ( L femur) Cont CA/ Vit D / Mag. Weight bearing exercise. Cont f/u with PCP for ongoing monitoring     Other specified hypothyroidism  Stable on levothyroxine. Records review     Latest Reference Range & Units 04/01/24 09:23   TSH 0.380 - 5.330 uIU/mL 3.790    Cont f/u with PCP for ongoing monitoring and medication management        Services suggested: No services needed at this time  Health Care Screening: Age-appropriate preventive services recommended by USPTF and ACIP covered by Medicare were discussed today. Services ordered if indicated and agreed upon by the patient.  Referrals offered: Community-based lifestyle interventions to reduce health risks and promote self-management and wellness, fall prevention, nutrition, physical activity, tobacco-use cessation, weight loss, and mental health services as per orders if indicated.    Discussion today about general wellness and lifestyle habits:    Prevent falls and reduce trip hazards; Cautioned about securing or removing rugs.  Have a working fire alarm and carbon monoxide detector.  Engage in regular physical activity and social activities.    Patient has colonoscopy scheduled 3/2025 with [facility]   Patient declines mammogram order today. Will discuss with PCP at next appt.     Follow-up: Return follow up with PCP as indicated.    HANDOUTS  OFFERED     [facility]

## 2025-03-03 NOTE — ASSESSMENT & PLAN NOTE
Stable. BMI 34.16. continue heart healthy diet and regular exercise. Cont f/u with PCP for ongoing monitoring and discussion

## 2025-03-03 NOTE — ASSESSMENT & PLAN NOTE
Stable on levothyroxine. Records review     Latest Reference Range & Units 04/01/24 09:23   TSH 0.380 - 5.330 uIU/mL 3.790    Cont f/u with PCP for ongoing monitoring and medication management

## 2025-03-03 NOTE — ASSESSMENT & PLAN NOTE
Stable on rosuvastatin Records review    Latest Reference Range & Units 04/01/24 09:23   Cholesterol,Tot 100 - 199 mg/dL 136   Triglycerides 0 - 149 mg/dL 107   HDL >=40 mg/dL 59   LDL <100 mg/dL 56      Cont heart healthy diet and regular exercise. Cont f/u with PCP for ongoing monitoring and medication management

## 2025-03-03 NOTE — ASSESSMENT & PLAN NOTE
Stable. Records review BMD (4/29/24) reports T - 1.5/ Z -0.6 ( L spine) ; T -0.8/ Z 0.3 ( L femur) Cont CA/ Vit D / Mag. Weight bearing exercise. Cont f/u with PCP for ongoing monitoring

## 2025-03-19 ENCOUNTER — APPOINTMENT (OUTPATIENT)
Dept: URBAN - METROPOLITAN AREA CLINIC 22 | Facility: CLINIC | Age: 75
Setting detail: DERMATOLOGY
End: 2025-03-19

## 2025-03-19 DIAGNOSIS — L30.4 ERYTHEMA INTERTRIGO: ICD-10-CM

## 2025-03-19 DIAGNOSIS — L57.0 ACTINIC KERATOSIS: ICD-10-CM

## 2025-03-19 PROCEDURE — ? LIQUID NITROGEN

## 2025-03-19 PROCEDURE — ? COUNSELING

## 2025-03-19 PROCEDURE — 99213 OFFICE O/P EST LOW 20 MIN: CPT | Mod: 25

## 2025-03-19 PROCEDURE — 17003 DESTRUCT PREMALG LES 2-14: CPT

## 2025-03-19 PROCEDURE — ? ADDITIONAL NOTES

## 2025-03-19 PROCEDURE — 17000 DESTRUCT PREMALG LESION: CPT

## 2025-03-19 ASSESSMENT — LOCATION SIMPLE DESCRIPTION DERM
LOCATION SIMPLE: LEFT BREAST
LOCATION SIMPLE: LEFT EAR
LOCATION SIMPLE: RIGHT BREAST

## 2025-03-19 ASSESSMENT — LOCATION ZONE DERM
LOCATION ZONE: TRUNK
LOCATION ZONE: EAR

## 2025-03-19 ASSESSMENT — LOCATION DETAILED DESCRIPTION DERM
LOCATION DETAILED: RIGHT INFRAMAMMARY CREASE (INNER QUADRANT)
LOCATION DETAILED: LEFT SCAPHA
LOCATION DETAILED: LEFT INFRAMAMMARY CREASE (INNER QUADRANT)
LOCATION DETAILED: LEFT SUPERIOR HELIX

## 2025-03-19 NOTE — PROCEDURE: ADDITIONAL NOTES
Detail Level: Simple
Additional Notes: 3/19/25: pt reports having a flare underneath her breast area and groin area. She trialed nystatin and that didn’t seem to improve condition, she then started fluconazole and noticed a rash appeared on her chest. She wondered if that was from oral antifungal.\\nDiscussed if she has a new flare on the mid chest she can trial hydrocortisone to help calm if unable to get in however recommend she call next time she has flare and we can consider bx due to variable responses, presentations and persistence of rash. \\n\\n7/2/24:  \\nPt states she is better with her intertrigo however despite completing fluconazole course she is getting a few papules on left breast again.  She is using zeasorb powder.   She has been on several topicals.  Discussed trial of nystatin cream as additional coverage as she has not used this and ok to refill Fluconazole if needed.  She has also used domeboro solution in the past with variable result.   She will also be seeing primary derm provider in September for routine follow up \\n\\nPrior: Patient has been dealing with waxing and waning fungal intertrigo since Feb. \\nShe is flaring more in last few days. Notes oral medication is what works at this stage. \\nShe has been using econazole and miconazole powder without help. \\nShe did use the [place] soln but notes it was exceptionally drying.
Render Risk Assessment In Note?: no

## 2025-03-19 NOTE — PROCEDURE: LIQUID NITROGEN
Number Of Freeze-Thaw Cycles: 2 freeze-thaw cycles
Render Note In Bullet Format When Appropriate: No
Consent: The patient's consent was obtained including but not limited to risks of crusting, scabbing, blistering, scarring, darker or lighter pigmentary change, recurrence, incomplete removal and infection.
Show Applicator Variable?: Yes
Detail Level: Detailed
Post-Care Instructions: I reviewed with the patient in detail post-care instructions. Patient is to wear sunprotection, and avoid picking at any of the treated lesions. Pt may apply Vaseline to crusted or scabbing areas.
Duration Of Freeze Thaw-Cycle (Seconds): 3

## 2025-04-15 ENCOUNTER — APPOINTMENT (OUTPATIENT)
Dept: MEDICAL GROUP | Facility: PHYSICIAN GROUP | Age: 75
End: 2025-04-15
Payer: MEDICARE

## 2025-04-15 VITALS
RESPIRATION RATE: 16 BRPM | OXYGEN SATURATION: 96 % | HEART RATE: 76 BPM | BODY MASS INDEX: 34.08 KG/M2 | TEMPERATURE: 97.8 F | DIASTOLIC BLOOD PRESSURE: 70 MMHG | HEIGHT: 64 IN | WEIGHT: 199.6 LBS | SYSTOLIC BLOOD PRESSURE: 120 MMHG

## 2025-04-15 DIAGNOSIS — L03.032 PARONYCHIA OF THIRD TOE, LEFT: ICD-10-CM

## 2025-04-15 DIAGNOSIS — M47.816 OSTEOARTHRITIS OF LUMBAR SPINE, UNSPECIFIED SPINAL OSTEOARTHRITIS COMPLICATION STATUS: ICD-10-CM

## 2025-04-15 DIAGNOSIS — R41.3 MEMORY CHANGE: ICD-10-CM

## 2025-04-15 PROCEDURE — 3078F DIAST BP <80 MM HG: CPT | Performed by: FAMILY MEDICINE

## 2025-04-15 PROCEDURE — 3074F SYST BP LT 130 MM HG: CPT | Performed by: FAMILY MEDICINE

## 2025-04-15 PROCEDURE — 99214 OFFICE O/P EST MOD 30 MIN: CPT | Performed by: FAMILY MEDICINE

## 2025-04-15 RX ORDER — CEPHALEXIN 500 MG/1
500 CAPSULE ORAL 3 TIMES DAILY
Qty: 30 CAPSULE | Refills: 0 | Status: SHIPPED | OUTPATIENT
Start: 2025-04-15

## 2025-04-15 ASSESSMENT — FIBROSIS 4 INDEX: FIB4 SCORE: 1.96

## 2025-04-15 NOTE — ASSESSMENT & PLAN NOTE
This is a chronic problem.  Patient states that she gets periodic pain in her lower back pain.  Rarely she will get pain down the left leg as well.  She gives a chiropractor and this seems to help.  She is tried various anti-inflammatories without success.  Not really interested in physical therapy or injections at this time.  Previous x-rays have showed degenerative arthritic changes as well as degenerative disc disease.

## 2025-04-15 NOTE — ASSESSMENT & PLAN NOTE
This is a new problem.  Patient states over the last several months she has noted occasional problem coming up with certain words.  She even has 1 long-term memory that she has difficulty recalling but otherwise is intact.  She has concerns and some friends of come down with Alzheimer's recently.  No family history of dementia.

## 2025-04-15 NOTE — PROGRESS NOTES
Subjective:     CC: Here for several issues.    HPI:   Deneen presents today with the following medical concerns:    Degenerative arthritis of lumbar spine  This is a chronic problem.  Patient states that she gets periodic pain in her lower back pain.  Rarely she will get pain down the left leg as well.  She gives a chiropractor and this seems to help.  She is tried various anti-inflammatories without success.  Not really interested in physical therapy or injections at this time.  Previous x-rays have showed degenerative arthritic changes as well as degenerative disc disease.    Memory change  This is a new problem.  Patient states over the last several months she has noted occasional problem coming up with certain words.  She even has 1 long-term memory that she has difficulty recalling but otherwise is intact.  She has concerns and some friends of come down with Alzheimer's recently.  No family history of dementia.    Paronychia of third toe, left  This is a new problem.  Patient has soreness to the end of her middle toe left foot.    Past Medical History:   Diagnosis Date    Arthritis     osteo - fingers, knees, back    Disorder of thyroid     hypoactive    Family history of diabetes mellitus 02/17/2015    High cholesterol     Hyperlipidemia     Morbid obesity (HCC) 06/15/2022    Pain 04/22/2016    low back-chronic    Pain 01/27/2017    right knee    Personal history of colonic polyps 02/12/2020    Goes to [facility].    Screening mammogram for high-risk patient 10/07/2019    Status post right knee replacement 01/30/2017       Social History     Tobacco Use    Smoking status: Never     Passive exposure: Never    Smokeless tobacco: Never   Vaping Use    Vaping status: Never Used   Substance Use Topics    Alcohol use: No    Drug use: No       Current Outpatient Medications Ordered in Epic   Medication Sig Dispense Refill    cephALEXin (KEFLEX) 500 MG Cap Take 1 Capsule by mouth 3 times a day. 30 Capsule 0     rosuvastatin (CRESTOR) 20 MG Tab TAKE 1 TABLET BY MOUTH ONCE DAILY IN THE EVENING 100 Tablet 3    levothyroxine (SYNTHROID) 50 MCG Tab TAKE 1 TABLET BY MOUTH ONCE DAILY IN THE MORNING ON AN EMPTY STOMACH 90 Tablet 3    econazole nitrate 1 % cream APPLY TOPICALLY TO AFFECTED AREA(S) 2 TIMES A DAY 30 g 0    MAGNESIUM PO magnesium      POTASSIUM PO potassium      ketoconazole (NIZORAL) 2 % Cream Apply 1 Squirt topically 2 times a day. Use on right external ear canal 15 g 3    HYDROcodone/acetaminophen (NORCO)  MG Tab Take 1 Tablet by mouth every 6 hours as needed.      methocarbamol (ROBAXIN) 500 MG Tab Take 1 Tablet by mouth 4 times a day as needed (back spasm). 90 Tablet 3    nystatin (MYCOSTATIN) powder APPLY TO AFFECTED AREA 3 TIMES A DAY FOR 7 DAYS 60 g 6    triamcinolone (KENALOG) 0.1 % lotion Apply to scalp after shampooing with Selsun Blue shampoo 60 mL 5    aspirin 81 MG EC tablet Take 1 Tab by mouth 2 times a day, with meals. 90 Tab 0    vitamin D3, cholecalciferol, 1000 UNIT Tab Take 1 Tab by mouth every day. 100 Tab 3    ascorbic acid (ASCORBIC ACID) 500 MG Tab Take 1,000 mg by mouth every day.      BETA CAROTENE PO Take 10,000 Int'l Units by mouth every day.      B Complex-C-Folic Acid (STRESS B COMPLEX PO) Take  by mouth 2 Times a Day.      Calcium 1500 MG TABS Take  by mouth every day.      methylPREDNISolone (MEDROL DOSEPAK) 4 MG Tablet Therapy Pack As directed on the packaging label. (Patient not taking: Reported on 3/3/2025) 21 Tablet 0     No current Epic-ordered facility-administered medications on file.       Allergies:  Meloxicam and Silicone    Health Maintenance: Completed    ROS:  Gen: no fevers/chills, no changes in weight  Eyes: no changes in vision  ENT: no sore throat, no hearing loss, no bloody nose  Pulm: no sob, no cough  CV: no chest pain, no palpitations  GI: no nausea/vomiting, no diarrhea  : no dysuria  MSk: no myalgias  Skin: no rash  Neuro: no headaches, no  "numbness/tingling  Heme/Lymph: no easy bruising      Objective:       Exam:  /70 (BP Location: Left arm, Patient Position: Sitting, BP Cuff Size: Adult)   Pulse 76   Temp 36.6 °C (97.8 °F) (Temporal)   Resp 16   Ht 1.626 m (5' 4\")   Wt 90.5 kg (199 lb 9.6 oz)   SpO2 96%   BMI 34.26 kg/m²  Body mass index is 34.26 kg/m².    Gen: Alert and oriented, No apparent distress.  Lungs: Normal effort,  Ext: No clubbing, cyanosis, edema.  Skin:    Patient has a small puncture to the medial aspect of the middle toe left foot.  No drainage is seen.  Neuro: Cranial nerves II through VIII are grossly intact.  No lateralized signs are seen.      Assessment & Plan:     75 y.o. female with the following -     1. Osteoarthritis of lumbar spine, unspecified spinal osteoarthritis complication status  This is a chronic problem.  Previous x-rays and MRIs reviewed.  For now she is going to continue with her chiropractor treatment and lidocaine patches.  I encouraged her also to do core exercises.  If her symptoms progress we can refer to physical therapy.    2. Paronychia of third toe, left  This is a new problem.  Patient be started on Keflex.  Return to clinic if it does not improve.    3. Memory change  This is a new problem.  We discussed cognitive impairment as well as senile dementia and Alzheimer's.  At this point she does not want any test done or a referral to neurology but if it does progress I think she needs to have that done.  Will continue to follow.      Return if symptoms worsen or fail to improve.    Please note that this dictation was created using voice recognition software. I have made every reasonable attempt to correct obvious errors, but I expect that there are errors of grammar and possibly content that I did not discover before finalizing the note.        "

## 2025-05-08 ENCOUNTER — OFFICE VISIT (OUTPATIENT)
Dept: MEDICAL GROUP | Facility: PHYSICIAN GROUP | Age: 75
End: 2025-05-08
Payer: MEDICARE

## 2025-05-08 VITALS
SYSTOLIC BLOOD PRESSURE: 116 MMHG | BODY MASS INDEX: 34.09 KG/M2 | DIASTOLIC BLOOD PRESSURE: 72 MMHG | TEMPERATURE: 98.7 F | RESPIRATION RATE: 16 BRPM | HEART RATE: 94 BPM | WEIGHT: 199.7 LBS | OXYGEN SATURATION: 96 % | HEIGHT: 64 IN

## 2025-05-08 DIAGNOSIS — G89.29 CHRONIC LEFT-SIDED LOW BACK PAIN WITH LEFT-SIDED SCIATICA: ICD-10-CM

## 2025-05-08 DIAGNOSIS — M54.42 CHRONIC LEFT-SIDED LOW BACK PAIN WITH LEFT-SIDED SCIATICA: ICD-10-CM

## 2025-05-08 PROBLEM — L03.032: Status: RESOLVED | Noted: 2025-04-15 | Resolved: 2025-05-08

## 2025-05-08 PROCEDURE — 3074F SYST BP LT 130 MM HG: CPT | Performed by: FAMILY MEDICINE

## 2025-05-08 PROCEDURE — 99213 OFFICE O/P EST LOW 20 MIN: CPT | Performed by: FAMILY MEDICINE

## 2025-05-08 PROCEDURE — 3078F DIAST BP <80 MM HG: CPT | Performed by: FAMILY MEDICINE

## 2025-05-08 RX ORDER — HYDROCODONE BITARTRATE AND ACETAMINOPHEN 10; 325 MG/1; MG/1
1 TABLET ORAL EVERY 8 HOURS PRN
Qty: 30 TABLET | Refills: 0 | Status: SHIPPED | OUTPATIENT
Start: 2025-05-08 | End: 2025-05-18

## 2025-05-08 RX ORDER — POLYETHYLENE GLYCOL 3350, SODIUM SULFATE ANHYDROUS, SODIUM BICARBONATE, SODIUM CHLORIDE, POTASSIUM CHLORIDE 236; 22.74; 6.74; 5.86; 2.97 G/4L; G/4L; G/4L; G/4L; G/4L
POWDER, FOR SOLUTION ORAL
COMMUNITY
Start: 2025-04-14 | End: 2025-05-08

## 2025-05-08 ASSESSMENT — FIBROSIS 4 INDEX: FIB4 SCORE: 1.96

## 2025-05-08 NOTE — PROGRESS NOTES
Subjective:     CC: Low back pain.    HPI:   Deneen presents today with the following medical concerns:    Chronic left-sided low back pain with left-sided sciatica  This is a chronic problem.  Patient states she is having another flare of her low back pain.  She has had several episodes since the first of this year.  She would like to go to physical therapy now and also get a refill on her Vicodin.  Her last prescription was a year ago and she was only given 15 at that time.  Her last x-rays were done in 2018 and 2019.    Past Medical History:   Diagnosis Date    Arthritis     osteo - fingers, knees, back    Disorder of thyroid     hypoactive    Family history of diabetes mellitus 02/17/2015    High cholesterol     Hyperlipidemia     Morbid obesity (HCC) 06/15/2022    Pain 04/22/2016    low back-chronic    Pain 01/27/2017    right knee    Personal history of colonic polyps 02/12/2020    Goes to [facility].    Screening mammogram for high-risk patient 10/07/2019    Status post right knee replacement 01/30/2017       Social History     Tobacco Use    Smoking status: Never     Passive exposure: Never    Smokeless tobacco: Never   Vaping Use    Vaping status: Never Used   Substance Use Topics    Alcohol use: No    Drug use: No       Current Outpatient Medications Ordered in Epic   Medication Sig Dispense Refill    HYDROcodone/acetaminophen (NORCO)  MG Tab Take 1 Tablet by mouth every 8 hours as needed for Severe Pain for up to 10 days. 30 Tablet 0    rosuvastatin (CRESTOR) 20 MG Tab TAKE 1 TABLET BY MOUTH ONCE DAILY IN THE EVENING 100 Tablet 3    levothyroxine (SYNTHROID) 50 MCG Tab TAKE 1 TABLET BY MOUTH ONCE DAILY IN THE MORNING ON AN EMPTY STOMACH 90 Tablet 3    econazole nitrate 1 % cream APPLY TOPICALLY TO AFFECTED AREA(S) 2 TIMES A DAY 30 g 0    MAGNESIUM PO magnesium      POTASSIUM PO potassium      ketoconazole (NIZORAL) 2 % Cream Apply 1 Squirt topically 2 times a day. Use on right external ear  "canal 15 g 3    methocarbamol (ROBAXIN) 500 MG Tab Take 1 Tablet by mouth 4 times a day as needed (back spasm). 90 Tablet 3    nystatin (MYCOSTATIN) powder APPLY TO AFFECTED AREA 3 TIMES A DAY FOR 7 DAYS 60 g 6    triamcinolone (KENALOG) 0.1 % lotion Apply to scalp after shampooing with Selsun Blue shampoo 60 mL 5    aspirin 81 MG EC tablet Take 1 Tab by mouth 2 times a day, with meals. 90 Tab 0    vitamin D3, cholecalciferol, 1000 UNIT Tab Take 1 Tab by mouth every day. 100 Tab 3    ascorbic acid (ASCORBIC ACID) 500 MG Tab Take 1,000 mg by mouth every day.      BETA CAROTENE PO Take 10,000 Int'l Units by mouth every day.      B Complex-C-Folic Acid (STRESS B COMPLEX PO) Take  by mouth 2 Times a Day.      Calcium 1500 MG TABS Take  by mouth every day.       No current [facility]-ordered facility-administered medications on file.       Allergies:  Meloxicam and Silicone    Health Maintenance: Completed    ROS:  Gen: no fevers/chills, no changes in weight  Eyes: no changes in vision  ENT: no sore throat, no hearing loss, no bloody nose  Pulm: no sob, no cough  CV: no chest pain, no palpitations  GI: no nausea/vomiting, no diarrhea  : no dysuria  Skin: no rash  Neuro: no headaches, no numbness/tingling  Heme/Lymph: no easy bruising      Objective:       Exam:  /72 (BP Location: Right arm, Patient Position: Sitting, BP Cuff Size: Large adult)   Pulse 94   Temp 37.1 °C (98.7 °F) (Temporal)   Resp 16   Ht 1.626 m (5' 4\")   Wt 90.6 kg (199 lb 11.2 oz)   SpO2 96%   BMI 34.28 kg/m²  Body mass index is 34.28 kg/m².    Gen: Alert and oriented, No apparent distress except for her low back pain.  Lungs: Normal effort,   Back:   Patient points to the upper and lower lumbar area as to where she feels the discomfort.  No current sciatica.  Gait is normal.  Ext: No clubbing, cyanosis, edema.      Assessment & Plan:     75 y.o. female with the following -     1. Chronic left-sided low back pain with left-sided sciatica  This " is a chronic recurrent problem.  Referral to physical therapy made and she was to go to [facility].  We did talk about the pain medication and she uses it very sparingly so at this point I am not can have her sign a pain contract or do a drug screen.  I told her if she does need recurrent refills then we will have to do that.  - Referral to Physical Therapy  - HYDROcodone/acetaminophen (NORCO)  MG Tab; Take 1 Tablet by mouth every 8 hours as needed for Severe Pain for up to 10 days.  Dispense: 30 Tablet; Refill: 0      Return if symptoms worsen or fail to improve.    Please note that this dictation was created using voice recognition software. I have made every reasonable attempt to correct obvious errors, but I expect that there are errors of grammar and possibly content that I did not discover before finalizing the note.

## 2025-05-08 NOTE — ASSESSMENT & PLAN NOTE
This is a chronic problem.  Patient states she is having another flare of her low back pain.  She has had several episodes since the first of this year.  She would like to go to physical therapy now and also get a refill on her Vicodin.  Her last prescription was a year ago and she was only given 15 at that time.  Her last x-rays were done in 2018 and 2019.

## 2025-05-17 ENCOUNTER — PATIENT MESSAGE (OUTPATIENT)
Dept: MEDICAL GROUP | Facility: PHYSICIAN GROUP | Age: 75
End: 2025-05-17
Payer: MEDICARE

## 2025-05-17 DIAGNOSIS — M54.42 CHRONIC LEFT-SIDED LOW BACK PAIN WITH LEFT-SIDED SCIATICA: ICD-10-CM

## 2025-05-17 DIAGNOSIS — G89.29 CHRONIC LEFT-SIDED LOW BACK PAIN WITH LEFT-SIDED SCIATICA: ICD-10-CM

## 2025-05-19 RX ORDER — METHOCARBAMOL 500 MG/1
500 TABLET, FILM COATED ORAL 3 TIMES DAILY
Qty: 30 TABLET | Refills: 0 | Status: SHIPPED | OUTPATIENT
Start: 2025-05-19 | End: 2025-08-17

## 2025-05-19 RX ORDER — METHOCARBAMOL 500 MG/1
500 TABLET, FILM COATED ORAL 4 TIMES DAILY PRN
Qty: 90 TABLET | Refills: 3 | Status: SHIPPED | OUTPATIENT
Start: 2025-05-19

## 2025-08-13 ENCOUNTER — APPOINTMENT (OUTPATIENT)
Dept: URBAN - METROPOLITAN AREA CLINIC 22 | Facility: CLINIC | Age: 75
Setting detail: DERMATOLOGY
End: 2025-08-13

## 2025-08-13 DIAGNOSIS — L57.0 ACTINIC KERATOSIS: ICD-10-CM

## 2025-08-13 PROBLEM — D48.5 NEOPLASM OF UNCERTAIN BEHAVIOR OF SKIN: Status: ACTIVE | Noted: 2025-08-13

## 2025-08-13 PROCEDURE — ? COUNSELING

## 2025-08-13 PROCEDURE — ? BIOPSY BY SHAVE METHOD

## 2025-08-13 PROCEDURE — ? LIQUID NITROGEN

## 2025-08-13 ASSESSMENT — LOCATION ZONE DERM: LOCATION ZONE: EAR

## 2025-08-13 ASSESSMENT — LOCATION SIMPLE DESCRIPTION DERM: LOCATION SIMPLE: LEFT EAR

## 2025-08-13 ASSESSMENT — LOCATION DETAILED DESCRIPTION DERM: LOCATION DETAILED: LEFT INFERIOR HELIX

## 2025-08-18 DIAGNOSIS — E03.9 ACQUIRED HYPOTHYROIDISM: ICD-10-CM

## 2025-08-19 RX ORDER — LEVOTHYROXINE SODIUM 50 UG/1
50 TABLET ORAL
Qty: 100 TABLET | Refills: 0 | Status: SHIPPED | OUTPATIENT
Start: 2025-08-19

## (undated) DEVICE — CANISTER SUCTION RIGID RED 1500CC (40EA/CA)

## (undated) DEVICE — SUCTION INSTRUMENT YANKAUER BULBOUS TIP W/O VENT (50EA/CA)

## (undated) DEVICE — LACTATED RINGERS INJ 1000 ML - (14EA/CA 60CA/PF)

## (undated) DEVICE — TOURNIQUET CUFF 34 X 4 ONE PORT DISP - STERILE (10/BX)

## (undated) DEVICE — BANDAGE ELASTIC 4 HONEYCOMB - 4"X5YD LF (20/CA)"

## (undated) DEVICE — TUBING CASSETTE CROSSFLOW INTEGRATED (10EA/CA)

## (undated) DEVICE — HUMID-VENT HEAT AND MOISTURE EXCHANGE- (50/BX)

## (undated) DEVICE — PROTECTOR ULNA NERVE - (36PR/CA)

## (undated) DEVICE — DRESSING 3X8 ADAPTIC GAUZE - NON-ADHERING (36/PK 6PK/BX)

## (undated) DEVICE — BLADE RECIPROCATING 12.7 X 78.7 X 1.0MM (1/EA)

## (undated) DEVICE — GLOVE BIOGEL PI INDICATOR SZ 7.5 SURGICAL PF LF -(50/BX 4BX/CA)

## (undated) DEVICE — TIP INTPLS HFLO ML ORFC BTRY - (12/CS)  FOR SURGILAV

## (undated) DEVICE — BLADE SHAVER AGGRESSIVE PLUS 4.0MM ANGLED (5EA/BX)

## (undated) DEVICE — MASK ANESTHESIA ADULT  - (100/CA)

## (undated) DEVICE — CONTAINER SPECIMEN BAG OR - STERILE 4 OZ W/LID (100EA/CA)

## (undated) DEVICE — SODIUM CHL. IRRIGATION 0.9% 3000ML (4EA/CA 65CA/PF)

## (undated) DEVICE — WATER IRRIGATION STERILE 1000ML (12EA/CA)

## (undated) DEVICE — GLOVE BIOGEL PI INDICATOR SZ 8.0 SURGICAL PF LF -(50/BX 4BX/CA)

## (undated) DEVICE — KIT ROOM DECONTAMINATION

## (undated) DEVICE — Device

## (undated) DEVICE — GLOVE BIOGEL SZ 6.5 SURGICAL PF LTX (50PR/BX 4BX/CA)

## (undated) DEVICE — NEEDLE SPINAL NON-SAFETY 18 GA X 3 IN (25EA/BX)

## (undated) DEVICE — BANDAGE ELASTIC STERILE VELCRO 6 X 5 YDS (25EA/CA)

## (undated) DEVICE — LENS/HOOD FOR SPACESUIT - (32/PK) PEEL AWAY FACE

## (undated) DEVICE — MIXER BONE CEMENT REVOLUTION - W/FEMORAL PRESSURIZER (6/CA)

## (undated) DEVICE — SENSOR SPO2 NEO LNCS ADHESIVE (20/BX) SEE USER NOTES

## (undated) DEVICE — HANDPIECE 10FT INTPLS SCT PLS IRRIGATION HAND CONTROL SET (6/PK)

## (undated) DEVICE — PIN TROCAR GEN 1/8X3 (4EA/BX)

## (undated) DEVICE — HEAD HOLDER JUNIOR/ADULT

## (undated) DEVICE — SODIUM CHL IRRIGATION 0.9% 1000ML (12EA/CA)

## (undated) DEVICE — GLOVE BIOGEL INDICATOR SZ 8 SURGICAL PF LTX - (50/BX 4BX/CA)

## (undated) DEVICE — GOWN WARMING STANDARD FLEX - (30/CA)

## (undated) DEVICE — TUBE CONNECTING SUCTION - CLEAR PLASTIC STERILE 72 IN (50EA/CA)

## (undated) DEVICE — PACK KNEE ARTHROSCOPY SM OR - (2EA/CA)

## (undated) DEVICE — SUTURE 3-0 MONOCRYL PLUS PS-1 - 27 INCH (36/BX)

## (undated) DEVICE — PADDING CAST 6 IN STERILE - 6 X 4 YDS (24/CA)

## (undated) DEVICE — SUTURE 1 VICRYL PLUS CTX - 8 X 18 INCH (12/BX)

## (undated) DEVICE — NEEDLE W/FACET TIP DULL VERSION W/STIMULATION CABLE SONOPLEX 21G X 4 (10/EA)"

## (undated) DEVICE — TUBING DAY USE W/CARTRIDGE (10EA/BX)

## (undated) DEVICE — SUTURE 2-0 MONOCRYL PLUS UNDYED CT-1 1 X 36 (36EA/BX)"

## (undated) DEVICE — MASK, LARYNGEAL AIRWAY #4

## (undated) DEVICE — TRAY SKIN SCRUB PVP WET (20EA/CA) PART #DYND70356 DISCONTINUED

## (undated) DEVICE — ELECTRODE 850 FOAM ADHESIVE - HYDROGEL RADIOTRNSPRNT (50/PK)

## (undated) DEVICE — BLADE SAGITTAL 6 SYSTEM 25MM

## (undated) DEVICE — KIT ANESTHESIA W/CIRCUIT & 3/LT BAG W/FILTER (20EA/CA)

## (undated) DEVICE — SUTURE GENERAL

## (undated) DEVICE — CHLORAPREP 26 ML APPLICATOR - ORANGE TINT(25/CA)

## (undated) DEVICE — NEPTUNE 4 PORT MANIFOLD - (20/PK)

## (undated) DEVICE — CUFF 30 X 4 TOURNIQUET 2 PORT DISPOSABLE STERILE (10EA/BX)

## (undated) DEVICE — GLOVE, LITE (PAIR)

## (undated) DEVICE — BAG, SPONGE COUNT 50600

## (undated) DEVICE — GLOVE BIOGEL SZ 8 SURGICAL PF LTX - (50PR/BX 4BX/CA)

## (undated) DEVICE — PACK TOTAL KNEE  (1/CA)

## (undated) DEVICE — ELECTRODE DUAL RETURN W/ CORD - (50/PK)

## (undated) DEVICE — STOCKINET TUBULAR 6IN STERILE - 6 X 48YDS (25/CA)

## (undated) DEVICE — GLOVE BIOGEL INDICATOR SZ 7SURGICAL PF LTX - (50/BX 4BX/CA)

## (undated) DEVICE — BAG SPONGE COUNT 10.25 X 32 - BLUE (250/CA)

## (undated) DEVICE — GLOVE BIOGEL PI ORTHO SZ 8.5 PF LF (40/BX)